# Patient Record
Sex: FEMALE | Race: OTHER | URBAN - METROPOLITAN AREA
[De-identification: names, ages, dates, MRNs, and addresses within clinical notes are randomized per-mention and may not be internally consistent; named-entity substitution may affect disease eponyms.]

---

## 2022-11-03 ENCOUNTER — APPOINTMENT (RX ONLY)
Dept: URBAN - METROPOLITAN AREA CLINIC 31 | Facility: CLINIC | Age: 77
Setting detail: DERMATOLOGY
End: 2022-11-03

## 2022-11-03 DIAGNOSIS — R20.2 PARESTHESIA OF SKIN: ICD-10-CM

## 2022-11-03 DIAGNOSIS — L57.0 ACTINIC KERATOSIS: ICD-10-CM

## 2022-11-03 DIAGNOSIS — Z00.00 ENCOUNTER FOR GENERAL ADULT MEDICAL EXAMINATION WITHOUT ABNORMAL FINDINGS: ICD-10-CM

## 2022-11-03 PROBLEM — D48.5 NEOPLASM OF UNCERTAIN BEHAVIOR OF SKIN: Status: ACTIVE | Noted: 2022-11-03

## 2022-11-03 PROCEDURE — ? LIQUID NITROGEN

## 2022-11-03 PROCEDURE — ? BIOPSY BY SHAVE METHOD

## 2022-11-03 PROCEDURE — 17003 DESTRUCT PREMALG LES 2-14: CPT

## 2022-11-03 PROCEDURE — ? COUNSELING

## 2022-11-03 PROCEDURE — 11103 TANGNTL BX SKIN EA SEP/ADDL: CPT

## 2022-11-03 PROCEDURE — 11102 TANGNTL BX SKIN SINGLE LES: CPT

## 2022-11-03 PROCEDURE — 99202 OFFICE O/P NEW SF 15 MIN: CPT | Mod: 25

## 2022-11-03 PROCEDURE — 17000 DESTRUCT PREMALG LESION: CPT | Mod: 59

## 2022-11-03 ASSESSMENT — LOCATION DETAILED DESCRIPTION DERM
LOCATION DETAILED: POSTERIOR MID-PARIETAL SCALP
LOCATION DETAILED: NASAL SUPRATIP
LOCATION DETAILED: LEFT SUPERIOR MEDIAL FOREHEAD
LOCATION DETAILED: LEFT SUPERIOR OCCIPITAL SCALP
LOCATION DETAILED: LEFT SUPERIOR FOREHEAD
LOCATION DETAILED: SUPERIOR MID FOREHEAD
LOCATION DETAILED: LEFT CENTRAL FRONTAL SCALP
LOCATION DETAILED: LEFT ANTERIOR EARLOBE
LOCATION DETAILED: RIGHT MEDIAL FRONTAL SCALP
LOCATION DETAILED: RIGHT SUPERIOR PARIETAL SCALP
LOCATION DETAILED: LEFT SUPERIOR HELIX
LOCATION DETAILED: LEFT SUPERIOR PARIETAL SCALP
LOCATION DETAILED: LEFT MID TEMPLE
LOCATION DETAILED: RIGHT INFERIOR FOREHEAD

## 2022-11-03 ASSESSMENT — LOCATION SIMPLE DESCRIPTION DERM
LOCATION SIMPLE: LEFT TEMPLE
LOCATION SIMPLE: SCALP
LOCATION SIMPLE: LEFT SCALP
LOCATION SIMPLE: SUPERIOR FOREHEAD
LOCATION SIMPLE: RIGHT FOREHEAD
LOCATION SIMPLE: POSTERIOR SCALP
LOCATION SIMPLE: LEFT FOREHEAD
LOCATION SIMPLE: NOSE
LOCATION SIMPLE: LEFT OCCIPITAL SCALP
LOCATION SIMPLE: RIGHT SCALP
LOCATION SIMPLE: LEFT EAR

## 2022-11-03 ASSESSMENT — LOCATION ZONE DERM
LOCATION ZONE: FACE
LOCATION ZONE: NOSE
LOCATION ZONE: EAR
LOCATION ZONE: SCALP

## 2022-11-03 NOTE — PROCEDURE: COUNSELING
Detail Level: Detailed
Detail Level: Zone
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

## 2022-11-03 NOTE — PROCEDURE: BIOPSY BY SHAVE METHOD
Detail Level: Detailed
Depth Of Biopsy: dermis
Was A Bandage Applied: Yes
Size Of Lesion In Cm: 0.3
X Size Of Lesion In Cm: 0
Biopsy Type: H and E
Biopsy Method: Dermablade
Anesthesia Type: 0.5% lidocaine without epinephrine
Anesthesia Volume In Cc: 0.5
Hemostasis: Silver Nitrate
Wound Care: Petrolatum
Dressing: bandage
Destruction After The Procedure: No
Type Of Destruction Used: Curettage
Curettage Text: The wound bed was treated with curettage after the biopsy was performed.
Cryotherapy Text: The wound bed was treated with cryotherapy after the biopsy was performed.
Electrodesiccation Text: The wound bed was treated with electrodesiccation after the biopsy was performed.
Electrodesiccation And Curettage Text: The wound bed was treated with electrodesiccation and curettage after the biopsy was performed.
Silver Nitrate Text: The wound bed was treated with silver nitrate after the biopsy was performed.
Lab: [id number]
Lab Facility: 
Consent: Written consent was obtained and risks were reviewed including but not limited to scarring, infection, bleeding, scabbing, incomplete removal, nerve damage and allergy to anesthesia.
Post-Care Instructions: I reviewed with the patient in detail post-care instructions. Patient is to keep the biopsy site dry overnight, and then apply bacitracin twice daily until healed. Patient may apply hydrogen peroxide soaks to remove any crusting.
Notification Instructions: Patient will be notified of biopsy results. However, patient instructed to call the office if not contacted within 2 weeks.
Billing Type: Third-Party Bill
Information: Selecting Yes will display possible errors in your note based on the variables you have selected. This validation is only offered as a suggestion for you. PLEASE NOTE THAT THE VALIDATION TEXT WILL BE REMOVED WHEN YOU FINALIZE YOUR NOTE. IF YOU WANT TO FAX A PRELIMINARY NOTE YOU WILL NEED TO TOGGLE THIS TO 'NO' IF YOU DO NOT WANT IT IN YOUR FAXED NOTE.
Size Of Lesion In Cm: 0.7
Size Of Lesion In Cm: 1

## 2022-11-29 ENCOUNTER — APPOINTMENT (RX ONLY)
Dept: URBAN - METROPOLITAN AREA CLINIC 31 | Facility: CLINIC | Age: 77
Setting detail: DERMATOLOGY
End: 2022-11-29

## 2022-11-29 DIAGNOSIS — L57.0 ACTINIC KERATOSIS: ICD-10-CM

## 2022-11-29 PROBLEM — D04.4 CARCINOMA IN SITU OF SKIN OF SCALP AND NECK: Status: ACTIVE | Noted: 2022-11-29

## 2022-11-29 PROCEDURE — ? COUNSELING

## 2022-11-29 PROCEDURE — ? LIQUID NITROGEN

## 2022-11-29 PROCEDURE — 17003 DESTRUCT PREMALG LES 2-14: CPT

## 2022-11-29 PROCEDURE — ? PRESCRIPTION

## 2022-11-29 PROCEDURE — 99213 OFFICE O/P EST LOW 20 MIN: CPT | Mod: 25

## 2022-11-29 PROCEDURE — 17000 DESTRUCT PREMALG LESION: CPT

## 2022-11-29 RX ORDER — FLUOROURACIL 5 MG/G
CREAM TOPICAL QHS
Qty: 40 | Refills: 0 | Status: ERX | COMMUNITY
Start: 2022-11-29

## 2022-11-29 RX ADMIN — FLUOROURACIL: 5 CREAM TOPICAL at 00:00

## 2022-11-29 ASSESSMENT — LOCATION SIMPLE DESCRIPTION DERM: LOCATION SIMPLE: SCALP

## 2022-11-29 ASSESSMENT — LOCATION DETAILED DESCRIPTION DERM
LOCATION DETAILED: LEFT SUPERIOR PARIETAL SCALP
LOCATION DETAILED: LEFT CENTRAL PARIETAL SCALP

## 2022-11-29 ASSESSMENT — LOCATION ZONE DERM: LOCATION ZONE: SCALP

## 2022-12-01 ENCOUNTER — RX ONLY (OUTPATIENT)
Age: 77
Setting detail: RX ONLY
End: 2022-12-01

## 2022-12-01 RX ORDER — FLUOROURACIL 5 MG/G
CREAM TOPICAL QHS
Qty: 40 | Refills: 0 | Status: ERX

## 2022-12-14 ENCOUNTER — APPOINTMENT (RX ONLY)
Dept: URBAN - METROPOLITAN AREA CLINIC 31 | Facility: CLINIC | Age: 77
Setting detail: DERMATOLOGY
End: 2022-12-14

## 2022-12-14 PROBLEM — D04.4 CARCINOMA IN SITU OF SKIN OF SCALP AND NECK: Status: ACTIVE | Noted: 2022-12-14

## 2022-12-14 PROCEDURE — 99213 OFFICE O/P EST LOW 20 MIN: CPT

## 2022-12-14 PROCEDURE — ? COUNSELING

## 2022-12-14 PROCEDURE — ? ADDITIONAL NOTES

## 2022-12-14 NOTE — PROCEDURE: MIPS QUALITY
Quality 265: Biopsy Follow-Up: Biopsy results reviewed, communicated, tracked, and documented
Quality 431: Preventive Care And Screening: Unhealthy Alcohol Use - Screening: Patient not identified as an unhealthy alcohol user when screened for unhealthy alcohol use using a systematic screening method
Quality 110: Preventive Care And Screening: Influenza Immunization: Influenza Immunization previously received during influenza season
Quality 226: Preventive Care And Screening: Tobacco Use: Screening And Cessation Intervention: Patient screened for tobacco use and is an ex/non-smoker
Quality 111:Pneumonia Vaccination Status For Older Adults: Pneumococcal vaccine (PPSV23) administered on or after patient’s 60th birthday and before the end of the measurement period
Detail Level: Detailed
Quality 130: Documentation Of Current Medications In The Medical Record: Current Medications Documented

## 2022-12-14 NOTE — PROCEDURE: ADDITIONAL NOTES
Detail Level: Detailed
Render Risk Assessment In Note?: no
Additional Notes: Continue using efudex daily in the morning, will recheck in 2 weeks.

## 2022-12-28 ENCOUNTER — APPOINTMENT (RX ONLY)
Dept: URBAN - METROPOLITAN AREA CLINIC 31 | Facility: CLINIC | Age: 77
Setting detail: DERMATOLOGY
End: 2022-12-28

## 2022-12-28 PROBLEM — D04.4 CARCINOMA IN SITU OF SKIN OF SCALP AND NECK: Status: ACTIVE | Noted: 2022-12-28

## 2022-12-28 PROCEDURE — ? ADDITIONAL NOTES

## 2022-12-28 PROCEDURE — ? COUNSELING

## 2022-12-28 PROCEDURE — 99213 OFFICE O/P EST LOW 20 MIN: CPT

## 2023-01-12 ENCOUNTER — APPOINTMENT (RX ONLY)
Dept: URBAN - METROPOLITAN AREA CLINIC 31 | Facility: CLINIC | Age: 78
Setting detail: DERMATOLOGY
End: 2023-01-12

## 2023-01-12 DIAGNOSIS — L24.4 IRRITANT CONTACT DERMATITIS DUE TO DRUGS IN CONTACT WITH SKIN: ICD-10-CM

## 2023-01-12 PROCEDURE — ? COUNSELING

## 2023-01-12 PROCEDURE — 99213 OFFICE O/P EST LOW 20 MIN: CPT

## 2023-01-12 PROCEDURE — ? ADDITIONAL NOTES

## 2023-01-12 ASSESSMENT — LOCATION DETAILED DESCRIPTION DERM
LOCATION DETAILED: RIGHT CENTRAL FRONTAL SCALP
LOCATION DETAILED: LEFT CENTRAL PARIETAL SCALP

## 2023-01-12 ASSESSMENT — LOCATION SIMPLE DESCRIPTION DERM
LOCATION SIMPLE: SCALP
LOCATION SIMPLE: RIGHT SCALP

## 2023-01-12 ASSESSMENT — LOCATION ZONE DERM: LOCATION ZONE: SCALP

## 2023-01-12 NOTE — PROCEDURE: ADDITIONAL NOTES
Additional Notes: D/C Efudex, apply vaseline jelly until healed\\nWill recheck sites in May at [facility]
Detail Level: Simple
Render Risk Assessment In Note?: no

## 2023-05-04 ENCOUNTER — APPOINTMENT (RX ONLY)
Dept: URBAN - METROPOLITAN AREA CLINIC 31 | Facility: CLINIC | Age: 78
Setting detail: DERMATOLOGY
End: 2023-05-04

## 2023-05-04 DIAGNOSIS — Z85.828 PERSONAL HISTORY OF OTHER MALIGNANT NEOPLASM OF SKIN: ICD-10-CM

## 2023-05-04 DIAGNOSIS — L81.4 OTHER MELANIN HYPERPIGMENTATION: ICD-10-CM

## 2023-05-04 DIAGNOSIS — D22 MELANOCYTIC NEVI: ICD-10-CM

## 2023-05-04 DIAGNOSIS — L82.1 OTHER SEBORRHEIC KERATOSIS: ICD-10-CM

## 2023-05-04 DIAGNOSIS — Z71.89 OTHER SPECIFIED COUNSELING: ICD-10-CM

## 2023-05-04 DIAGNOSIS — D18.0 HEMANGIOMA: ICD-10-CM

## 2023-05-04 DIAGNOSIS — L57.0 ACTINIC KERATOSIS: ICD-10-CM

## 2023-05-04 PROBLEM — D18.01 HEMANGIOMA OF SKIN AND SUBCUTANEOUS TISSUE: Status: ACTIVE | Noted: 2023-05-04

## 2023-05-04 PROBLEM — D22.5 MELANOCYTIC NEVI OF TRUNK: Status: ACTIVE | Noted: 2023-05-04

## 2023-05-04 PROCEDURE — ? COUNSELING

## 2023-05-04 PROCEDURE — 17000 DESTRUCT PREMALG LESION: CPT

## 2023-05-04 PROCEDURE — 17003 DESTRUCT PREMALG LES 2-14: CPT

## 2023-05-04 PROCEDURE — 99213 OFFICE O/P EST LOW 20 MIN: CPT | Mod: 25

## 2023-05-04 PROCEDURE — ? LIQUID NITROGEN

## 2023-05-04 ASSESSMENT — LOCATION SIMPLE DESCRIPTION DERM
LOCATION SIMPLE: LEFT FOREHEAD
LOCATION SIMPLE: LEFT SCALP
LOCATION SIMPLE: SCALP
LOCATION SIMPLE: RIGHT UPPER BACK
LOCATION SIMPLE: RIGHT FOREHEAD
LOCATION SIMPLE: NOSE
LOCATION SIMPLE: CHEST

## 2023-05-04 ASSESSMENT — LOCATION DETAILED DESCRIPTION DERM
LOCATION DETAILED: RIGHT SUPERIOR FOREHEAD
LOCATION DETAILED: LEFT CENTRAL FRONTAL SCALP
LOCATION DETAILED: LEFT SUPERIOR FOREHEAD
LOCATION DETAILED: UPPER STERNUM
LOCATION DETAILED: RIGHT INFERIOR UPPER BACK
LOCATION DETAILED: LEFT MEDIAL FRONTAL SCALP
LOCATION DETAILED: RIGHT SUPERIOR UPPER BACK
LOCATION DETAILED: NASAL DORSUM
LOCATION DETAILED: LEFT SUPERIOR MEDIAL FOREHEAD
LOCATION DETAILED: RIGHT SUPERIOR MEDIAL UPPER BACK
LOCATION DETAILED: RIGHT SUPERIOR PARIETAL SCALP
LOCATION DETAILED: LEFT CENTRAL PARIETAL SCALP

## 2023-05-04 ASSESSMENT — LOCATION ZONE DERM
LOCATION ZONE: FACE
LOCATION ZONE: SCALP
LOCATION ZONE: TRUNK
LOCATION ZONE: NOSE

## 2023-05-04 NOTE — PROCEDURE: MIPS QUALITY
Quality 130: Documentation Of Current Medications In The Medical Record: Current Medications Documented
Quality 110: Preventive Care And Screening: Influenza Immunization: Influenza Immunization Administered during Influenza season
Quality 431: Preventive Care And Screening: Unhealthy Alcohol Use - Screening: Patient not identified as an unhealthy alcohol user when screened for unhealthy alcohol use using a systematic screening method
Detail Level: Detailed
Quality 226: Preventive Care And Screening: Tobacco Use: Screening And Cessation Intervention: Patient screened for tobacco use and is an ex/non-smoker
Quality 111:Pneumonia Vaccination Status For Older Adults: Patient received any pneumococcal conjugate or polysaccharide vaccine on or after their 60th birthday and before the end of the measurement period

## 2023-11-07 ENCOUNTER — APPOINTMENT (RX ONLY)
Dept: URBAN - METROPOLITAN AREA CLINIC 31 | Facility: CLINIC | Age: 78
Setting detail: DERMATOLOGY
End: 2023-11-07

## 2023-11-07 DIAGNOSIS — D22 MELANOCYTIC NEVI: ICD-10-CM

## 2023-11-07 DIAGNOSIS — L57.0 ACTINIC KERATOSIS: ICD-10-CM

## 2023-11-07 DIAGNOSIS — D18.0 HEMANGIOMA: ICD-10-CM

## 2023-11-07 DIAGNOSIS — L82.1 OTHER SEBORRHEIC KERATOSIS: ICD-10-CM

## 2023-11-07 DIAGNOSIS — Z71.89 OTHER SPECIFIED COUNSELING: ICD-10-CM

## 2023-11-07 DIAGNOSIS — Z85.828 PERSONAL HISTORY OF OTHER MALIGNANT NEOPLASM OF SKIN: ICD-10-CM

## 2023-11-07 DIAGNOSIS — L81.4 OTHER MELANIN HYPERPIGMENTATION: ICD-10-CM

## 2023-11-07 PROBLEM — D18.01 HEMANGIOMA OF SKIN AND SUBCUTANEOUS TISSUE: Status: ACTIVE | Noted: 2023-11-07

## 2023-11-07 PROBLEM — D22.5 MELANOCYTIC NEVI OF TRUNK: Status: ACTIVE | Noted: 2023-11-07

## 2023-11-07 PROBLEM — D48.5 NEOPLASM OF UNCERTAIN BEHAVIOR OF SKIN: Status: ACTIVE | Noted: 2023-11-07

## 2023-11-07 PROCEDURE — ? LIQUID NITROGEN

## 2023-11-07 PROCEDURE — ? BIOPSY BY SHAVE METHOD

## 2023-11-07 PROCEDURE — 17004 DESTROY PREMAL LESIONS 15/>: CPT

## 2023-11-07 PROCEDURE — ? COUNSELING

## 2023-11-07 PROCEDURE — 11102 TANGNTL BX SKIN SINGLE LES: CPT | Mod: 59

## 2023-11-07 PROCEDURE — 99213 OFFICE O/P EST LOW 20 MIN: CPT | Mod: 25

## 2023-11-07 ASSESSMENT — LOCATION ZONE DERM
LOCATION ZONE: TRUNK
LOCATION ZONE: FACE
LOCATION ZONE: SCALP
LOCATION ZONE: NECK

## 2023-11-07 ASSESSMENT — LOCATION SIMPLE DESCRIPTION DERM
LOCATION SIMPLE: RIGHT FOREHEAD
LOCATION SIMPLE: POSTERIOR SCALP
LOCATION SIMPLE: RIGHT UPPER BACK
LOCATION SIMPLE: RIGHT OCCIPITAL SCALP
LOCATION SIMPLE: LEFT OCCIPITAL SCALP
LOCATION SIMPLE: SCALP
LOCATION SIMPLE: NECK
LOCATION SIMPLE: LEFT FOREHEAD

## 2023-11-07 ASSESSMENT — LOCATION DETAILED DESCRIPTION DERM
LOCATION DETAILED: LEFT SUPERIOR OCCIPITAL SCALP
LOCATION DETAILED: RIGHT MID-UPPER BACK
LOCATION DETAILED: RIGHT SUPERIOR OCCIPITAL SCALP
LOCATION DETAILED: LEFT INFERIOR LATERAL FOREHEAD
LOCATION DETAILED: RIGHT CENTRAL PARIETAL SCALP
LOCATION DETAILED: LEFT SUPERIOR PARIETAL SCALP
LOCATION DETAILED: POSTERIOR MID-PARIETAL SCALP
LOCATION DETAILED: LEFT INFERIOR FOREHEAD
LOCATION DETAILED: RIGHT INFERIOR FOREHEAD
LOCATION DETAILED: LEFT INFERIOR MEDIAL FOREHEAD
LOCATION DETAILED: RIGHT SUPERIOR UPPER BACK
LOCATION DETAILED: RIGHT INFERIOR UPPER BACK
LOCATION DETAILED: LEFT SUPERIOR LATERAL NECK

## 2023-11-07 NOTE — PROCEDURE: COUNSELING
Detail Level: Generalized
Sunscreen Recommendations: Sun screen (SPF 30 or greater) should be applied during peak UV exposure (between 10am and 2pm) and reapplied after exercise or swimming.\\nRecommended La Roche- Posay Anthelios with SPF 60 or Ming Tone Water Babies with SPF 30 and above.

## 2023-12-27 ENCOUNTER — APPOINTMENT (RX ONLY)
Dept: URBAN - METROPOLITAN AREA CLINIC 31 | Facility: CLINIC | Age: 78
Setting detail: DERMATOLOGY
End: 2023-12-27

## 2023-12-27 PROBLEM — D04.5 CARCINOMA IN SITU OF SKIN OF TRUNK: Status: ACTIVE | Noted: 2023-12-27

## 2023-12-27 PROCEDURE — ? CURETTAGE AND DESTRUCTION

## 2023-12-27 PROCEDURE — 17261 DSTRJ MAL LES T/A/L .6-1.0CM: CPT

## 2023-12-27 NOTE — PROCEDURE: CURETTAGE AND DESTRUCTION
Detail Level: Detailed
Accession # (Optional): [id number]
Number Of Curettages: 4
Size Of Lesion In Cm: 0.6
Size Of Lesion After Curettage: 1
Add Intralesional Injection: No
Concentration (Mg/Ml Or Millions Of Plaque Forming Units/Cc): 0.01
Anesthesia Type: 1% lidocaine with epinephrine
Anesthesia Volume In Cc: 0.5
Cautery Type: electrodesiccation
What Was Performed First?: Curettage
Final Size Statement: The size of the lesion after curettage was
Additional Information: (Optional): The wound was cleaned, and a pressure dressing was applied.  The patient received detailed post-op instructions.
Consent was obtained from the patient. The risks, benefits and alternatives to therapy were discussed in detail. Specifically, the risks of infection, scarring, bleeding, prolonged wound healing, nerve injury, incomplete removal, allergy to anesthesia and recurrence were addressed. Alternatives to ED&C, such as: surgical removal and XRT were also discussed.  Prior to the procedure, the treatment site was clearly identified and confirmed by the patient. All components of Universal Protocol/PAUSE Rule completed.
Post-Care Instructions: I reviewed with the patient in detail post-care instructions. Patient is to keep the area dry for 48 hours, and not to engage in any swimming until the area is healed. Should the patient develop any fevers, chills, bleeding, severe pain patient will contact the office immediately.
Bill As A Line Item Or As Units: Line Item

## 2024-01-01 ENCOUNTER — APPOINTMENT (OUTPATIENT)
Dept: RADIOLOGY | Facility: MEDICAL CENTER | Age: 79
End: 2024-01-01
Attending: PSYCHIATRY & NEUROLOGY
Payer: MEDICARE

## 2024-01-01 ENCOUNTER — APPOINTMENT (OUTPATIENT)
Dept: RADIOLOGY | Facility: MEDICAL CENTER | Age: 79
End: 2024-01-01
Attending: NURSE PRACTITIONER
Payer: MEDICARE

## 2024-01-01 ENCOUNTER — HOSPITAL ENCOUNTER (OUTPATIENT)
Dept: RADIOLOGY | Facility: MEDICAL CENTER | Age: 79
End: 2024-08-14
Payer: MEDICARE

## 2024-01-01 ENCOUNTER — APPOINTMENT (OUTPATIENT)
Dept: RADIOLOGY | Facility: MEDICAL CENTER | Age: 79
End: 2024-01-01
Attending: EMERGENCY MEDICINE
Payer: MEDICARE

## 2024-01-01 ENCOUNTER — APPOINTMENT (OUTPATIENT)
Dept: CARDIOLOGY | Facility: MEDICAL CENTER | Age: 79
End: 2024-01-01
Attending: NURSE PRACTITIONER
Payer: MEDICARE

## 2024-01-01 ENCOUNTER — HOSPITAL ENCOUNTER (INPATIENT)
Facility: MEDICAL CENTER | Age: 79
LOS: 5 days | End: 2024-08-19
Attending: EMERGENCY MEDICINE | Admitting: INTERNAL MEDICINE
Payer: MEDICARE

## 2024-01-01 ENCOUNTER — APPOINTMENT (OUTPATIENT)
Dept: RADIOLOGY | Facility: MEDICAL CENTER | Age: 79
End: 2024-01-01
Attending: RADIOLOGY
Payer: MEDICARE

## 2024-01-01 VITALS
HEIGHT: 65 IN | SYSTOLIC BLOOD PRESSURE: 164 MMHG | OXYGEN SATURATION: 91 % | BODY MASS INDEX: 24.1 KG/M2 | TEMPERATURE: 97.9 F | WEIGHT: 144.62 LBS | HEART RATE: 92 BPM | RESPIRATION RATE: 19 BRPM | DIASTOLIC BLOOD PRESSURE: 66 MMHG

## 2024-01-01 DIAGNOSIS — E04.1 THYROID NODULE: ICD-10-CM

## 2024-01-01 DIAGNOSIS — I63.512 CEREBROVASCULAR ACCIDENT (CVA) DUE TO OCCLUSION OF LEFT MIDDLE CEREBRAL ARTERY (HCC): ICD-10-CM

## 2024-01-01 DIAGNOSIS — I10 PRIMARY HYPERTENSION: ICD-10-CM

## 2024-01-01 DIAGNOSIS — S20.212A CONTUSION OF LEFT CHEST WALL, INITIAL ENCOUNTER: ICD-10-CM

## 2024-01-01 DIAGNOSIS — I63.9 ACUTE ISCHEMIC STROKE (HCC): ICD-10-CM

## 2024-01-01 DIAGNOSIS — N18.4 CKD (CHRONIC KIDNEY DISEASE) STAGE 4, GFR 15-29 ML/MIN (HCC): ICD-10-CM

## 2024-01-01 DIAGNOSIS — D63.8 ANEMIA, CHRONIC DISEASE: ICD-10-CM

## 2024-01-01 LAB
ABO + RH BLD: NORMAL
ABO GROUP BLD: NORMAL
ALBUMIN SERPL BCP-MCNC: 3.6 G/DL (ref 3.2–4.9)
ALBUMIN/GLOB SERPL: 1.4 G/DL
ALP SERPL-CCNC: 124 U/L (ref 30–99)
ALT SERPL-CCNC: 8 U/L (ref 2–50)
ANION GAP SERPL CALC-SCNC: 15 MMOL/L (ref 7–16)
ANION GAP SERPL CALC-SCNC: 16 MMOL/L (ref 7–16)
ANION GAP SERPL CALC-SCNC: 16 MMOL/L (ref 7–16)
ANION GAP SERPL CALC-SCNC: 18 MMOL/L (ref 7–16)
ANION GAP SERPL CALC-SCNC: 20 MMOL/L (ref 7–16)
ANION GAP SERPL CALC-SCNC: 20 MMOL/L (ref 7–16)
APPEARANCE UR: CLEAR
APTT PPP: 26.9 SEC (ref 24.7–36)
AST SERPL-CCNC: 19 U/L (ref 12–45)
BACTERIA #/AREA URNS HPF: NEGATIVE /HPF
BARCODED ABORH UBTYP: 9500
BARCODED PRD CODE UBPRD: NORMAL
BARCODED UNIT NUM UBUNT: NORMAL
BILIRUB SERPL-MCNC: 0.2 MG/DL (ref 0.1–1.5)
BILIRUB UR QL STRIP.AUTO: NEGATIVE
BLD GP AB SCN SERPL QL: NORMAL
BUN SERPL-MCNC: 104 MG/DL (ref 8–22)
BUN SERPL-MCNC: 59 MG/DL (ref 8–22)
BUN SERPL-MCNC: 76 MG/DL (ref 8–22)
BUN SERPL-MCNC: 82 MG/DL (ref 8–22)
BUN SERPL-MCNC: 83 MG/DL (ref 8–22)
BUN SERPL-MCNC: 84 MG/DL (ref 8–22)
CALCIUM ALBUM COR SERPL-MCNC: 10.1 MG/DL (ref 8.5–10.5)
CALCIUM SERPL-MCNC: 8.6 MG/DL (ref 8.5–10.5)
CALCIUM SERPL-MCNC: 9.2 MG/DL (ref 8.5–10.5)
CALCIUM SERPL-MCNC: 9.6 MG/DL (ref 8.5–10.5)
CALCIUM SERPL-MCNC: 9.8 MG/DL (ref 8.5–10.5)
CFT BLD TEG: 6.7 MIN (ref 4.6–9.1)
CFT P HPASE BLD TEG: 5.7 MIN (ref 4.3–8.3)
CHLORIDE SERPL-SCNC: 100 MMOL/L (ref 96–112)
CHLORIDE SERPL-SCNC: 95 MMOL/L (ref 96–112)
CHLORIDE SERPL-SCNC: 96 MMOL/L (ref 96–112)
CHLORIDE SERPL-SCNC: 99 MMOL/L (ref 96–112)
CHOLEST SERPL-MCNC: 290 MG/DL (ref 100–199)
CLOT ANGLE BLD TEG: 82.5 DEGREES (ref 63–78)
CO2 SERPL-SCNC: 17 MMOL/L (ref 20–33)
CO2 SERPL-SCNC: 21 MMOL/L (ref 20–33)
CO2 SERPL-SCNC: 21 MMOL/L (ref 20–33)
CO2 SERPL-SCNC: 22 MMOL/L (ref 20–33)
CO2 SERPL-SCNC: 22 MMOL/L (ref 20–33)
CO2 SERPL-SCNC: 23 MMOL/L (ref 20–33)
COLOR UR: YELLOW
COMPONENT R 8504R: NORMAL
CREAT SERPL-MCNC: 2.77 MG/DL (ref 0.5–1.4)
CREAT SERPL-MCNC: 3.4 MG/DL (ref 0.5–1.4)
CREAT SERPL-MCNC: 3.86 MG/DL (ref 0.5–1.4)
CREAT SERPL-MCNC: 4.25 MG/DL (ref 0.5–1.4)
CREAT SERPL-MCNC: 4.94 MG/DL (ref 0.5–1.4)
CREAT SERPL-MCNC: 6.03 MG/DL (ref 0.5–1.4)
CRP SERPL HS-MCNC: 11.51 MG/DL (ref 0–0.75)
CT.EXTRINSIC BLD ROTEM: 0.7 MIN (ref 0.8–2.1)
EKG IMPRESSION: NORMAL
EPI CELLS #/AREA URNS HPF: NEGATIVE /HPF
ERYTHROCYTE [DISTWIDTH] IN BLOOD BY AUTOMATED COUNT: 54.4 FL (ref 35.9–50)
ERYTHROCYTE [DISTWIDTH] IN BLOOD BY AUTOMATED COUNT: 54.9 FL (ref 35.9–50)
ERYTHROCYTE [DISTWIDTH] IN BLOOD BY AUTOMATED COUNT: 55.8 FL (ref 35.9–50)
ERYTHROCYTE [DISTWIDTH] IN BLOOD BY AUTOMATED COUNT: 56 FL (ref 35.9–50)
ERYTHROCYTE [DISTWIDTH] IN BLOOD BY AUTOMATED COUNT: 56.5 FL (ref 35.9–50)
ERYTHROCYTE [DISTWIDTH] IN BLOOD BY AUTOMATED COUNT: 56.8 FL (ref 35.9–50)
EST. AVERAGE GLUCOSE BLD GHB EST-MCNC: 126 MG/DL
GFR SERPLBLD CREATININE-BSD FMLA CKD-EPI: 10 ML/MIN/1.73 M 2
GFR SERPLBLD CREATININE-BSD FMLA CKD-EPI: 11 ML/MIN/1.73 M 2
GFR SERPLBLD CREATININE-BSD FMLA CKD-EPI: 13 ML/MIN/1.73 M 2
GFR SERPLBLD CREATININE-BSD FMLA CKD-EPI: 17 ML/MIN/1.73 M 2
GFR SERPLBLD CREATININE-BSD FMLA CKD-EPI: 7 ML/MIN/1.73 M 2
GFR SERPLBLD CREATININE-BSD FMLA CKD-EPI: 9 ML/MIN/1.73 M 2
GLOBULIN SER CALC-MCNC: 2.6 G/DL (ref 1.9–3.5)
GLUCOSE BLD STRIP.AUTO-MCNC: 104 MG/DL (ref 65–99)
GLUCOSE BLD STRIP.AUTO-MCNC: 120 MG/DL (ref 65–99)
GLUCOSE BLD STRIP.AUTO-MCNC: 127 MG/DL (ref 65–99)
GLUCOSE BLD STRIP.AUTO-MCNC: 128 MG/DL (ref 65–99)
GLUCOSE BLD STRIP.AUTO-MCNC: 134 MG/DL (ref 65–99)
GLUCOSE BLD STRIP.AUTO-MCNC: 135 MG/DL (ref 65–99)
GLUCOSE BLD STRIP.AUTO-MCNC: 141 MG/DL (ref 65–99)
GLUCOSE BLD STRIP.AUTO-MCNC: 143 MG/DL (ref 65–99)
GLUCOSE BLD STRIP.AUTO-MCNC: 148 MG/DL (ref 65–99)
GLUCOSE BLD STRIP.AUTO-MCNC: 152 MG/DL (ref 65–99)
GLUCOSE BLD STRIP.AUTO-MCNC: 155 MG/DL (ref 65–99)
GLUCOSE BLD STRIP.AUTO-MCNC: 156 MG/DL (ref 65–99)
GLUCOSE BLD STRIP.AUTO-MCNC: 160 MG/DL (ref 65–99)
GLUCOSE BLD STRIP.AUTO-MCNC: 163 MG/DL (ref 65–99)
GLUCOSE BLD STRIP.AUTO-MCNC: 172 MG/DL (ref 65–99)
GLUCOSE BLD STRIP.AUTO-MCNC: 186 MG/DL (ref 65–99)
GLUCOSE SERPL-MCNC: 124 MG/DL (ref 65–99)
GLUCOSE SERPL-MCNC: 146 MG/DL (ref 65–99)
GLUCOSE SERPL-MCNC: 146 MG/DL (ref 65–99)
GLUCOSE SERPL-MCNC: 153 MG/DL (ref 65–99)
GLUCOSE SERPL-MCNC: 168 MG/DL (ref 65–99)
GLUCOSE SERPL-MCNC: 170 MG/DL (ref 65–99)
GLUCOSE UR STRIP.AUTO-MCNC: 100 MG/DL
HAV IGM SERPL QL IA: NORMAL
HBA1C MFR BLD: 6 % (ref 4–5.6)
HBV CORE IGM SER QL: NORMAL
HBV SURFACE AB SERPL IA-ACNC: 241 MIU/ML (ref 0–10)
HBV SURFACE AG SER QL: NORMAL
HCT VFR BLD AUTO: 18.9 % (ref 37–47)
HCT VFR BLD AUTO: 21.6 % (ref 37–47)
HCT VFR BLD AUTO: 22.5 % (ref 37–47)
HCT VFR BLD AUTO: 22.6 % (ref 37–47)
HCT VFR BLD AUTO: 23.3 % (ref 37–47)
HCT VFR BLD AUTO: 23.6 % (ref 37–47)
HCT VFR BLD AUTO: 23.8 % (ref 37–47)
HCT VFR BLD AUTO: 23.9 % (ref 37–47)
HCT VFR BLD AUTO: 24.4 % (ref 37–47)
HCT VFR BLD AUTO: 28.3 % (ref 37–47)
HCT VFR BLD AUTO: 28.9 % (ref 37–47)
HCT VFR BLD AUTO: 30.7 % (ref 37–47)
HCT VFR BLD AUTO: 31 % (ref 37–47)
HCV AB SER QL: NORMAL
HDLC SERPL-MCNC: 66 MG/DL
HGB BLD-MCNC: 6.1 G/DL (ref 12–16)
HGB BLD-MCNC: 7 G/DL (ref 12–16)
HGB BLD-MCNC: 7.1 G/DL (ref 12–16)
HGB BLD-MCNC: 7.4 G/DL (ref 12–16)
HGB BLD-MCNC: 7.5 G/DL (ref 12–16)
HGB BLD-MCNC: 7.7 G/DL (ref 12–16)
HGB BLD-MCNC: 8.1 G/DL (ref 12–16)
HGB BLD-MCNC: 9 G/DL (ref 12–16)
HGB BLD-MCNC: 9.5 G/DL (ref 12–16)
HGB BLD-MCNC: 9.8 G/DL (ref 12–16)
HGB BLD-MCNC: 9.9 G/DL (ref 12–16)
HOLDING TUBE BB 8507: NORMAL
HYALINE CASTS #/AREA URNS LPF: NORMAL /LPF
INR PPP: 1.04 (ref 0.87–1.13)
KETONES UR STRIP.AUTO-MCNC: NEGATIVE MG/DL
LDLC SERPL CALC-MCNC: 171 MG/DL
LEUKOCYTE ESTERASE UR QL STRIP.AUTO: NEGATIVE
LV EJECT FRACT MOD 2C 99903: 63.37
LV EJECT FRACT MOD 4C 99902: 58.88
LV EJECT FRACT MOD BP 99901: 61.55
MAGNESIUM SERPL-MCNC: 2.2 MG/DL (ref 1.5–2.5)
MAGNESIUM SERPL-MCNC: 2.5 MG/DL (ref 1.5–2.5)
MCF BLD TEG: 73.8 MM (ref 52–69)
MCF.PLATELET INHIB BLD ROTEM: >52 MM (ref 15–32)
MCH RBC QN AUTO: 29.5 PG (ref 27–33)
MCH RBC QN AUTO: 29.7 PG (ref 27–33)
MCH RBC QN AUTO: 29.7 PG (ref 27–33)
MCH RBC QN AUTO: 29.8 PG (ref 27–33)
MCH RBC QN AUTO: 30 PG (ref 27–33)
MCH RBC QN AUTO: 30 PG (ref 27–33)
MCHC RBC AUTO-ENTMCNC: 31.8 G/DL (ref 32.2–35.5)
MCHC RBC AUTO-ENTMCNC: 31.8 G/DL (ref 32.2–35.5)
MCHC RBC AUTO-ENTMCNC: 32.2 G/DL (ref 32.2–35.5)
MCHC RBC AUTO-ENTMCNC: 32.2 G/DL (ref 32.2–35.5)
MCHC RBC AUTO-ENTMCNC: 32.3 G/DL (ref 32.2–35.5)
MCHC RBC AUTO-ENTMCNC: 32.6 G/DL (ref 32.2–35.5)
MCV RBC AUTO: 91.1 FL (ref 81.4–97.8)
MCV RBC AUTO: 91.6 FL (ref 81.4–97.8)
MCV RBC AUTO: 93 FL (ref 81.4–97.8)
MCV RBC AUTO: 93.1 FL (ref 81.4–97.8)
MCV RBC AUTO: 93.6 FL (ref 81.4–97.8)
MCV RBC AUTO: 93.7 FL (ref 81.4–97.8)
MICRO URNS: ABNORMAL
MUCOUS THREADS #/AREA URNS HPF: NORMAL /HPF
NITRITE UR QL STRIP.AUTO: NEGATIVE
PA AA BLD-ACNC: ABNORMAL % (ref 0–11)
PA ADP BLD-ACNC: ABNORMAL % (ref 0–17)
PH UR STRIP.AUTO: 7 [PH] (ref 5–8)
PHOSPHATE SERPL-MCNC: 8.3 MG/DL (ref 2.5–4.5)
PLATELET # BLD AUTO: 284 K/UL (ref 164–446)
PLATELET # BLD AUTO: 296 K/UL (ref 164–446)
PLATELET # BLD AUTO: 306 K/UL (ref 164–446)
PLATELET # BLD AUTO: 345 K/UL (ref 164–446)
PLATELET # BLD AUTO: 354 K/UL (ref 164–446)
PLATELET # BLD AUTO: 401 K/UL (ref 164–446)
PMV BLD AUTO: 11.1 FL (ref 9–12.9)
PMV BLD AUTO: 11.1 FL (ref 9–12.9)
PMV BLD AUTO: 11.2 FL (ref 9–12.9)
PMV BLD AUTO: 11.3 FL (ref 9–12.9)
POTASSIUM SERPL-SCNC: 2.8 MMOL/L (ref 3.6–5.5)
POTASSIUM SERPL-SCNC: 3.7 MMOL/L (ref 3.6–5.5)
POTASSIUM SERPL-SCNC: 3.7 MMOL/L (ref 3.6–5.5)
POTASSIUM SERPL-SCNC: 3.8 MMOL/L (ref 3.6–5.5)
POTASSIUM SERPL-SCNC: 3.9 MMOL/L (ref 3.6–5.5)
POTASSIUM SERPL-SCNC: 4.2 MMOL/L (ref 3.6–5.5)
PREALB SERPL-MCNC: 20.1 MG/DL (ref 18–38)
PRODUCT TYPE UPROD: NORMAL
PROT SERPL-MCNC: 6.2 G/DL (ref 6–8.2)
PROT UR QL STRIP: 300 MG/DL
PROTHROMBIN TIME: 13.7 SEC (ref 12–14.6)
RBC # BLD AUTO: 2.03 M/UL (ref 4.2–5.4)
RBC # BLD AUTO: 2.49 M/UL (ref 4.2–5.4)
RBC # BLD AUTO: 2.59 M/UL (ref 4.2–5.4)
RBC # BLD AUTO: 2.61 M/UL (ref 4.2–5.4)
RBC # BLD AUTO: 3.02 M/UL (ref 4.2–5.4)
RBC # BLD AUTO: 3.3 M/UL (ref 4.2–5.4)
RBC # URNS HPF: NORMAL /HPF
RBC UR QL AUTO: NEGATIVE
RH BLD: NORMAL
SODIUM SERPL-SCNC: 133 MMOL/L (ref 135–145)
SODIUM SERPL-SCNC: 135 MMOL/L (ref 135–145)
SODIUM SERPL-SCNC: 135 MMOL/L (ref 135–145)
SODIUM SERPL-SCNC: 136 MMOL/L (ref 135–145)
SODIUM SERPL-SCNC: 136 MMOL/L (ref 135–145)
SODIUM SERPL-SCNC: 138 MMOL/L (ref 135–145)
SP GR UR STRIP.AUTO: 1.02
TEG ALGORITHM TGALG: ABNORMAL
TRIGL SERPL-MCNC: 263 MG/DL (ref 0–149)
TSH SERPL DL<=0.005 MIU/L-ACNC: 1.65 UIU/ML (ref 0.38–5.33)
UFH PPP CHRO-ACNC: 0.17 IU/ML
UFH PPP CHRO-ACNC: 0.33 IU/ML
UFH PPP CHRO-ACNC: 0.38 IU/ML
UFH PPP CHRO-ACNC: 0.43 IU/ML
UFH PPP CHRO-ACNC: 0.5 IU/ML
UFH PPP CHRO-ACNC: 0.61 IU/ML
UFH PPP CHRO-ACNC: 0.64 IU/ML
UFH PPP CHRO-ACNC: <0.1 IU/ML
UFH PPP CHRO-ACNC: <0.1 IU/ML
UNIT STATUS USTAT: NORMAL
UROBILINOGEN UR STRIP.AUTO-MCNC: 0.2 MG/DL
WBC # BLD AUTO: 10 K/UL (ref 4.8–10.8)
WBC # BLD AUTO: 12.5 K/UL (ref 4.8–10.8)
WBC # BLD AUTO: 12.9 K/UL (ref 4.8–10.8)
WBC # BLD AUTO: 13.2 K/UL (ref 4.8–10.8)
WBC # BLD AUTO: 13.3 K/UL (ref 4.8–10.8)
WBC # BLD AUTO: 13.7 K/UL (ref 4.8–10.8)
WBC #/AREA URNS HPF: NORMAL /HPF

## 2024-01-01 PROCEDURE — 85520 HEPARIN ASSAY: CPT

## 2024-01-01 PROCEDURE — 99232 SBSQ HOSP IP/OBS MODERATE 35: CPT | Performed by: PSYCHIATRY & NEUROLOGY

## 2024-01-01 PROCEDURE — 700105 HCHG RX REV CODE 258: Performed by: NURSE PRACTITIONER

## 2024-01-01 PROCEDURE — 70450 CT HEAD/BRAIN W/O DYE: CPT

## 2024-01-01 PROCEDURE — 85027 COMPLETE CBC AUTOMATED: CPT | Mod: 91

## 2024-01-01 PROCEDURE — 700102 HCHG RX REV CODE 250 W/ 637 OVERRIDE(OP): Performed by: STUDENT IN AN ORGANIZED HEALTH CARE EDUCATION/TRAINING PROGRAM

## 2024-01-01 PROCEDURE — 770022 HCHG ROOM/CARE - ICU (200)

## 2024-01-01 PROCEDURE — 85018 HEMOGLOBIN: CPT | Mod: 91

## 2024-01-01 PROCEDURE — 83735 ASSAY OF MAGNESIUM: CPT

## 2024-01-01 PROCEDURE — 95816 EEG AWAKE AND DROWSY: CPT | Mod: 26 | Performed by: PSYCHIATRY & NEUROLOGY

## 2024-01-01 PROCEDURE — 80048 BASIC METABOLIC PNL TOTAL CA: CPT

## 2024-01-01 PROCEDURE — 700101 HCHG RX REV CODE 250: Performed by: NURSE PRACTITIONER

## 2024-01-01 PROCEDURE — 85018 HEMOGLOBIN: CPT

## 2024-01-01 PROCEDURE — 86901 BLOOD TYPING SEROLOGIC RH(D): CPT

## 2024-01-01 PROCEDURE — 700101 HCHG RX REV CODE 250: Performed by: RADIOLOGY

## 2024-01-01 PROCEDURE — 700111 HCHG RX REV CODE 636 W/ 250 OVERRIDE (IP): Performed by: NURSE PRACTITIONER

## 2024-01-01 PROCEDURE — 82962 GLUCOSE BLOOD TEST: CPT

## 2024-01-01 PROCEDURE — 700102 HCHG RX REV CODE 250 W/ 637 OVERRIDE(OP): Performed by: NURSE PRACTITIONER

## 2024-01-01 PROCEDURE — 70496 CT ANGIOGRAPHY HEAD: CPT

## 2024-01-01 PROCEDURE — 99238 HOSP IP/OBS DSCHRG MGMT 30/<: CPT | Performed by: INTERNAL MEDICINE

## 2024-01-01 PROCEDURE — 93005 ELECTROCARDIOGRAM TRACING: CPT | Performed by: NURSE PRACTITIONER

## 2024-01-01 PROCEDURE — 85027 COMPLETE CBC AUTOMATED: CPT

## 2024-01-01 PROCEDURE — 700111 HCHG RX REV CODE 636 W/ 250 OVERRIDE (IP): Mod: JZ | Performed by: PSYCHIATRY & NEUROLOGY

## 2024-01-01 PROCEDURE — 99223 1ST HOSP IP/OBS HIGH 75: CPT | Performed by: PSYCHIATRY & NEUROLOGY

## 2024-01-01 PROCEDURE — 70498 CT ANGIOGRAPHY NECK: CPT

## 2024-01-01 PROCEDURE — 80053 COMPREHEN METABOLIC PANEL: CPT

## 2024-01-01 PROCEDURE — 80061 LIPID PANEL: CPT

## 2024-01-01 PROCEDURE — 85014 HEMATOCRIT: CPT

## 2024-01-01 PROCEDURE — 70551 MRI BRAIN STEM W/O DYE: CPT

## 2024-01-01 PROCEDURE — 86706 HEP B SURFACE ANTIBODY: CPT

## 2024-01-01 PROCEDURE — 85014 HEMATOCRIT: CPT | Mod: 91

## 2024-01-01 PROCEDURE — 86900 BLOOD TYPING SEROLOGIC ABO: CPT

## 2024-01-01 PROCEDURE — 5A1D70Z PERFORMANCE OF URINARY FILTRATION, INTERMITTENT, LESS THAN 6 HOURS PER DAY: ICD-10-PCS | Performed by: INTERNAL MEDICINE

## 2024-01-01 PROCEDURE — 700111 HCHG RX REV CODE 636 W/ 250 OVERRIDE (IP): Mod: JG | Performed by: RADIOLOGY

## 2024-01-01 PROCEDURE — 03CG3ZZ EXTIRPATION OF MATTER FROM INTRACRANIAL ARTERY, PERCUTANEOUS APPROACH: ICD-10-PCS | Performed by: RADIOLOGY

## 2024-01-01 PROCEDURE — 700105 HCHG RX REV CODE 258: Performed by: RADIOLOGY

## 2024-01-01 PROCEDURE — 95720 EEG PHY/QHP EA INCR W/VEEG: CPT | Performed by: PSYCHIATRY & NEUROLOGY

## 2024-01-01 PROCEDURE — A9270 NON-COVERED ITEM OR SERVICE: HCPCS | Performed by: NURSE PRACTITIONER

## 2024-01-01 PROCEDURE — 36620 INSERTION CATHETER ARTERY: CPT | Performed by: NURSE PRACTITIONER

## 2024-01-01 PROCEDURE — 82962 GLUCOSE BLOOD TEST: CPT | Mod: 91

## 2024-01-01 PROCEDURE — 99233 SBSQ HOSP IP/OBS HIGH 50: CPT | Performed by: PSYCHIATRY & NEUROLOGY

## 2024-01-01 PROCEDURE — 99291 CRITICAL CARE FIRST HOUR: CPT | Performed by: NURSE PRACTITIONER

## 2024-01-01 PROCEDURE — 85384 FIBRINOGEN ACTIVITY: CPT

## 2024-01-01 PROCEDURE — 85520 HEPARIN ASSAY: CPT | Mod: 91

## 2024-01-01 PROCEDURE — 84100 ASSAY OF PHOSPHORUS: CPT

## 2024-01-01 PROCEDURE — 85347 COAGULATION TIME ACTIVATED: CPT

## 2024-01-01 PROCEDURE — A9270 NON-COVERED ITEM OR SERVICE: HCPCS | Performed by: STUDENT IN AN ORGANIZED HEALTH CARE EDUCATION/TRAINING PROGRAM

## 2024-01-01 PROCEDURE — 74177 CT ABD & PELVIS W/CONTRAST: CPT

## 2024-01-01 PROCEDURE — 95700 EEG CONT REC W/VID EEG TECH: CPT | Performed by: PSYCHIATRY & NEUROLOGY

## 2024-01-01 PROCEDURE — 85610 PROTHROMBIN TIME: CPT

## 2024-01-01 PROCEDURE — 36620 INSERTION CATHETER ARTERY: CPT

## 2024-01-01 PROCEDURE — 86140 C-REACTIVE PROTEIN: CPT

## 2024-01-01 PROCEDURE — 4A10X4Z MONITORING OF CENTRAL NERVOUS ELECTRICAL ACTIVITY, EXTERNAL APPROACH: ICD-10-PCS | Performed by: PSYCHIATRY & NEUROLOGY

## 2024-01-01 PROCEDURE — 700117 HCHG RX CONTRAST REV CODE 255: Performed by: EMERGENCY MEDICINE

## 2024-01-01 PROCEDURE — 99291 CRITICAL CARE FIRST HOUR: CPT

## 2024-01-01 PROCEDURE — 700111 HCHG RX REV CODE 636 W/ 250 OVERRIDE (IP): Performed by: EMERGENCY MEDICINE

## 2024-01-01 PROCEDURE — 302136 NUTRITION PUMP: Performed by: NURSE PRACTITIONER

## 2024-01-01 PROCEDURE — 700101 HCHG RX REV CODE 250: Performed by: STUDENT IN AN ORGANIZED HEALTH CARE EDUCATION/TRAINING PROGRAM

## 2024-01-01 PROCEDURE — 97162 PT EVAL MOD COMPLEX 30 MIN: CPT

## 2024-01-01 PROCEDURE — 36223 PLACE CATH CAROTID/INOM ART: CPT

## 2024-01-01 PROCEDURE — 86923 COMPATIBILITY TEST ELECTRIC: CPT

## 2024-01-01 PROCEDURE — 95714 VEEG EA 12-26 HR UNMNTR: CPT | Performed by: PSYCHIATRY & NEUROLOGY

## 2024-01-01 PROCEDURE — 81001 URINALYSIS AUTO W/SCOPE: CPT

## 2024-01-01 PROCEDURE — 90935 HEMODIALYSIS ONE EVALUATION: CPT

## 2024-01-01 PROCEDURE — 95816 EEG AWAKE AND DROWSY: CPT | Performed by: PSYCHIATRY & NEUROLOGY

## 2024-01-01 PROCEDURE — 99223 1ST HOSP IP/OBS HIGH 75: CPT | Performed by: PHYSICAL MEDICINE & REHABILITATION

## 2024-01-01 PROCEDURE — 86850 RBC ANTIBODY SCREEN: CPT

## 2024-01-01 PROCEDURE — 84443 ASSAY THYROID STIM HORMONE: CPT

## 2024-01-01 PROCEDURE — 93306 TTE W/DOPPLER COMPLETE: CPT | Mod: 26 | Performed by: INTERNAL MEDICINE

## 2024-01-01 PROCEDURE — 700105 HCHG RX REV CODE 258: Performed by: STUDENT IN AN ORGANIZED HEALTH CARE EDUCATION/TRAINING PROGRAM

## 2024-01-01 PROCEDURE — 85730 THROMBOPLASTIN TIME PARTIAL: CPT

## 2024-01-01 PROCEDURE — 03HY32Z INSERTION OF MONITORING DEVICE INTO UPPER ARTERY, PERCUTANEOUS APPROACH: ICD-10-PCS | Performed by: STUDENT IN AN ORGANIZED HEALTH CARE EDUCATION/TRAINING PROGRAM

## 2024-01-01 PROCEDURE — 80074 ACUTE HEPATITIS PANEL: CPT

## 2024-01-01 PROCEDURE — P9016 RBC LEUKOCYTES REDUCED: HCPCS

## 2024-01-01 PROCEDURE — 93306 TTE W/DOPPLER COMPLETE: CPT

## 2024-01-01 PROCEDURE — 700111 HCHG RX REV CODE 636 W/ 250 OVERRIDE (IP): Mod: JZ | Performed by: RADIOLOGY

## 2024-01-01 PROCEDURE — 30233N1 TRANSFUSION OF NONAUTOLOGOUS RED BLOOD CELLS INTO PERIPHERAL VEIN, PERCUTANEOUS APPROACH: ICD-10-PCS | Performed by: NURSE PRACTITIONER

## 2024-01-01 PROCEDURE — 97167 OT EVAL HIGH COMPLEX 60 MIN: CPT

## 2024-01-01 PROCEDURE — 99291 CRITICAL CARE FIRST HOUR: CPT | Mod: 25 | Performed by: NURSE PRACTITIONER

## 2024-01-01 PROCEDURE — 93010 ELECTROCARDIOGRAM REPORT: CPT | Performed by: INTERNAL MEDICINE

## 2024-01-01 PROCEDURE — 700117 HCHG RX CONTRAST REV CODE 255: Performed by: PSYCHIATRY & NEUROLOGY

## 2024-01-01 PROCEDURE — 97530 THERAPEUTIC ACTIVITIES: CPT

## 2024-01-01 PROCEDURE — 36430 TRANSFUSION BLD/BLD COMPNT: CPT

## 2024-01-01 PROCEDURE — 97535 SELF CARE MNGMENT TRAINING: CPT

## 2024-01-01 PROCEDURE — 0042T CT-CEREBRAL PERFUSION ANALYSIS: CPT

## 2024-01-01 PROCEDURE — 83036 HEMOGLOBIN GLYCOSYLATED A1C: CPT

## 2024-01-01 PROCEDURE — 700111 HCHG RX REV CODE 636 W/ 250 OVERRIDE (IP): Mod: JZ | Performed by: NURSE PRACTITIONER

## 2024-01-01 PROCEDURE — 84134 ASSAY OF PREALBUMIN: CPT

## 2024-01-01 PROCEDURE — 85576 BLOOD PLATELET AGGREGATION: CPT | Mod: 91

## 2024-01-01 PROCEDURE — 97602 WOUND(S) CARE NON-SELECTIVE: CPT

## 2024-01-01 RX ORDER — ATORVASTATIN CALCIUM 80 MG/1
80 TABLET, FILM COATED ORAL EVERY EVENING
Status: DISCONTINUED | OUTPATIENT
Start: 2024-01-01 | End: 2024-01-01

## 2024-01-01 RX ORDER — ACETAMINOPHEN 325 MG/1
650 TABLET ORAL EVERY 6 HOURS PRN
Status: DISCONTINUED | OUTPATIENT
Start: 2024-01-01 | End: 2024-01-01

## 2024-01-01 RX ORDER — AMOXICILLIN 250 MG
2 CAPSULE ORAL EVERY EVENING
Status: DISCONTINUED | OUTPATIENT
Start: 2024-01-01 | End: 2024-01-01

## 2024-01-01 RX ORDER — LEVETIRACETAM 500 MG/5ML
1000 INJECTION, SOLUTION, CONCENTRATE INTRAVENOUS EVERY 12 HOURS
Status: DISCONTINUED | OUTPATIENT
Start: 2024-01-01 | End: 2024-01-01

## 2024-01-01 RX ORDER — POTASSIUM CHLORIDE 7.45 MG/ML
10 INJECTION INTRAVENOUS ONCE
Status: COMPLETED | OUTPATIENT
Start: 2024-01-01 | End: 2024-01-01

## 2024-01-01 RX ORDER — FUROSEMIDE 40 MG
80 TABLET ORAL 2 TIMES DAILY
COMMUNITY
Start: 2024-01-01

## 2024-01-01 RX ORDER — ALBUTEROL SULFATE 90 UG/1
2 INHALANT RESPIRATORY (INHALATION) EVERY 6 HOURS PRN
COMMUNITY
Start: 2024-01-01

## 2024-01-01 RX ORDER — ACETAMINOPHEN 325 MG/1
650 TABLET ORAL EVERY 6 HOURS PRN
Status: DISCONTINUED | OUTPATIENT
Start: 2024-01-01 | End: 2024-01-01 | Stop reason: HOSPADM

## 2024-01-01 RX ORDER — HYDRALAZINE HYDROCHLORIDE 20 MG/ML
INJECTION INTRAMUSCULAR; INTRAVENOUS
Status: DISPENSED
Start: 2024-01-01 | End: 2024-01-01

## 2024-01-01 RX ORDER — POLYETHYLENE GLYCOL 3350 17 G/17G
1 POWDER, FOR SOLUTION ORAL
Status: DISCONTINUED | OUTPATIENT
Start: 2024-01-01 | End: 2024-01-01 | Stop reason: HOSPADM

## 2024-01-01 RX ORDER — CLOPIDOGREL BISULFATE 75 MG/1
75 TABLET ORAL DAILY
COMMUNITY

## 2024-01-01 RX ORDER — ONDANSETRON 2 MG/ML
4 INJECTION INTRAMUSCULAR; INTRAVENOUS EVERY 4 HOURS PRN
Status: DISCONTINUED | OUTPATIENT
Start: 2024-01-01 | End: 2024-01-01 | Stop reason: HOSPADM

## 2024-01-01 RX ORDER — LABETALOL HYDROCHLORIDE 5 MG/ML
10 INJECTION, SOLUTION INTRAVENOUS
Status: COMPLETED | OUTPATIENT
Start: 2024-01-01 | End: 2024-01-01

## 2024-01-01 RX ORDER — LABETALOL HYDROCHLORIDE 5 MG/ML
10-20 INJECTION, SOLUTION INTRAVENOUS EVERY 4 HOURS PRN
Status: DISCONTINUED | OUTPATIENT
Start: 2024-01-01 | End: 2024-01-01 | Stop reason: HOSPADM

## 2024-01-01 RX ORDER — SODIUM CHLORIDE 9 MG/ML
500 INJECTION, SOLUTION INTRAVENOUS ONCE
Status: COMPLETED | OUTPATIENT
Start: 2024-01-01 | End: 2024-01-01

## 2024-01-01 RX ORDER — ONDANSETRON 4 MG/1
4 TABLET, ORALLY DISINTEGRATING ORAL EVERY 4 HOURS PRN
Status: DISCONTINUED | OUTPATIENT
Start: 2024-01-01 | End: 2024-01-01

## 2024-01-01 RX ORDER — PANTOPRAZOLE SODIUM 40 MG/10ML
40 INJECTION, POWDER, LYOPHILIZED, FOR SOLUTION INTRAVENOUS 2 TIMES DAILY
Status: COMPLETED | OUTPATIENT
Start: 2024-01-01 | End: 2024-01-01

## 2024-01-01 RX ORDER — NOREPINEPHRINE BITARTRATE 0.03 MG/ML
0-1 INJECTION, SOLUTION INTRAVENOUS CONTINUOUS
Status: DISCONTINUED | OUTPATIENT
Start: 2024-01-01 | End: 2024-01-01

## 2024-01-01 RX ORDER — HYDRALAZINE HYDROCHLORIDE 20 MG/ML
10-20 INJECTION INTRAMUSCULAR; INTRAVENOUS EVERY 4 HOURS PRN
Status: DISCONTINUED | OUTPATIENT
Start: 2024-01-01 | End: 2024-01-01 | Stop reason: HOSPADM

## 2024-01-01 RX ORDER — PANTOPRAZOLE SODIUM 40 MG/1
1 TABLET, DELAYED RELEASE ORAL DAILY
COMMUNITY

## 2024-01-01 RX ORDER — POLYETHYLENE GLYCOL 3350 17 G/17G
1 POWDER, FOR SOLUTION ORAL
Status: DISCONTINUED | OUTPATIENT
Start: 2024-01-01 | End: 2024-01-01

## 2024-01-01 RX ORDER — LEVETIRACETAM 500 MG/5ML
1000 INJECTION, SOLUTION, CONCENTRATE INTRAVENOUS DAILY
Status: DISCONTINUED | OUTPATIENT
Start: 2024-08-20 | End: 2024-01-01 | Stop reason: HOSPADM

## 2024-01-01 RX ORDER — DEXTROSE MONOHYDRATE 25 G/50ML
25 INJECTION, SOLUTION INTRAVENOUS
Status: DISCONTINUED | OUTPATIENT
Start: 2024-01-01 | End: 2024-01-01 | Stop reason: HOSPADM

## 2024-01-01 RX ORDER — CLOPIDOGREL BISULFATE 75 MG/1
75 TABLET ORAL DAILY
Status: DISCONTINUED | OUTPATIENT
Start: 2024-01-01 | End: 2024-01-01

## 2024-01-01 RX ORDER — GABAPENTIN 100 MG/1
1 CAPSULE ORAL 3 TIMES DAILY
COMMUNITY
Start: 2024-01-01

## 2024-01-01 RX ORDER — HYDRALAZINE HYDROCHLORIDE 20 MG/ML
INJECTION INTRAMUSCULAR; INTRAVENOUS
Status: COMPLETED | OUTPATIENT
Start: 2024-01-01 | End: 2024-01-01

## 2024-01-01 RX ORDER — MIDAZOLAM HYDROCHLORIDE 1 MG/ML
0.5 INJECTION INTRAMUSCULAR; INTRAVENOUS
Status: DISPENSED | OUTPATIENT
Start: 2024-01-01 | End: 2024-01-01

## 2024-01-01 RX ORDER — ONDANSETRON 4 MG/1
4 TABLET, ORALLY DISINTEGRATING ORAL EVERY 4 HOURS PRN
Status: DISCONTINUED | OUTPATIENT
Start: 2024-01-01 | End: 2024-01-01 | Stop reason: HOSPADM

## 2024-01-01 RX ORDER — HYDRALAZINE HYDROCHLORIDE 20 MG/ML
10 INJECTION INTRAMUSCULAR; INTRAVENOUS
Status: DISCONTINUED | OUTPATIENT
Start: 2024-01-01 | End: 2024-01-01

## 2024-01-01 RX ORDER — ATORVASTATIN CALCIUM 40 MG/1
40 TABLET, FILM COATED ORAL EVERY EVENING
Status: DISCONTINUED | OUTPATIENT
Start: 2024-01-01 | End: 2024-01-01

## 2024-01-01 RX ORDER — BIMATOPROST 0.1 MG/ML
1 SOLUTION/ DROPS OPHTHALMIC
COMMUNITY

## 2024-01-01 RX ORDER — LABETALOL HYDROCHLORIDE 5 MG/ML
10 INJECTION, SOLUTION INTRAVENOUS ONCE
Status: DISCONTINUED | OUTPATIENT
Start: 2024-01-01 | End: 2024-01-01

## 2024-01-01 RX ORDER — LEVETIRACETAM 500 MG/5ML
500 INJECTION, SOLUTION, CONCENTRATE INTRAVENOUS
Status: DISCONTINUED | OUTPATIENT
Start: 2024-01-01 | End: 2024-01-01 | Stop reason: HOSPADM

## 2024-01-01 RX ORDER — DEXMEDETOMIDINE HYDROCHLORIDE 4 UG/ML
.1-1.5 INJECTION, SOLUTION INTRAVENOUS CONTINUOUS
Status: DISCONTINUED | OUTPATIENT
Start: 2024-01-01 | End: 2024-01-01

## 2024-01-01 RX ORDER — TRIAMCINOLONE ACETONIDE 5 MG/G
1 CREAM TOPICAL 3 TIMES DAILY
COMMUNITY
Start: 2024-01-01

## 2024-01-01 RX ORDER — GAUZE BANDAGE 2" X 2"
100 BANDAGE TOPICAL DAILY
Status: DISCONTINUED | OUTPATIENT
Start: 2024-01-01 | End: 2024-01-01 | Stop reason: HOSPADM

## 2024-01-01 RX ORDER — HYDRALAZINE HYDROCHLORIDE 20 MG/ML
10 INJECTION INTRAMUSCULAR; INTRAVENOUS EVERY 4 HOURS PRN
Status: DISCONTINUED | OUTPATIENT
Start: 2024-01-01 | End: 2024-01-01

## 2024-01-01 RX ORDER — AMOXICILLIN 250 MG
2 CAPSULE ORAL EVERY EVENING
Status: DISCONTINUED | OUTPATIENT
Start: 2024-01-01 | End: 2024-01-01 | Stop reason: HOSPADM

## 2024-01-01 RX ORDER — HYDRALAZINE HYDROCHLORIDE 20 MG/ML
10-20 INJECTION INTRAMUSCULAR; INTRAVENOUS EVERY 4 HOURS PRN
Status: DISCONTINUED | OUTPATIENT
Start: 2024-01-01 | End: 2024-01-01

## 2024-01-01 RX ORDER — MORPHINE SULFATE 4 MG/ML
2 INJECTION INTRAVENOUS
Status: DISCONTINUED | OUTPATIENT
Start: 2024-01-01 | End: 2024-01-01 | Stop reason: HOSPADM

## 2024-01-01 RX ORDER — CEPHALEXIN 500 MG/1
500 CAPSULE ORAL 2 TIMES DAILY
COMMUNITY
Start: 2024-01-01

## 2024-01-01 RX ORDER — TRIAMTERENE AND HYDROCHLOROTHIAZIDE 75; 50 MG/1; MG/1
1 TABLET ORAL DAILY
COMMUNITY

## 2024-01-01 RX ORDER — ALLOPURINOL 300 MG/1
300 TABLET ORAL DAILY
COMMUNITY
Start: 2024-01-01

## 2024-01-01 RX ORDER — SODIUM CHLORIDE 9 MG/ML
1000 INJECTION, SOLUTION INTRAVENOUS
Status: DISCONTINUED | OUTPATIENT
Start: 2024-01-01 | End: 2024-01-01 | Stop reason: HOSPADM

## 2024-01-01 RX ORDER — LABETALOL HYDROCHLORIDE 5 MG/ML
10-20 INJECTION, SOLUTION INTRAVENOUS EVERY 4 HOURS PRN
Status: DISCONTINUED | OUTPATIENT
Start: 2024-01-01 | End: 2024-01-01

## 2024-01-01 RX ORDER — HEPARIN SODIUM 5000 [USP'U]/100ML
0-30 INJECTION, SOLUTION INTRAVENOUS CONTINUOUS
Status: DISCONTINUED | OUTPATIENT
Start: 2024-01-01 | End: 2024-01-01 | Stop reason: HOSPADM

## 2024-01-01 RX ORDER — ASPIRIN 81 MG/1
81 TABLET, CHEWABLE ORAL EVERY EVENING
Status: DISCONTINUED | OUTPATIENT
Start: 2024-01-01 | End: 2024-01-01

## 2024-01-01 RX ORDER — ATORVASTATIN CALCIUM 40 MG/1
80 TABLET, FILM COATED ORAL EVERY EVENING
Status: DISCONTINUED | OUTPATIENT
Start: 2024-01-01 | End: 2024-01-01 | Stop reason: HOSPADM

## 2024-01-01 RX ORDER — POTASSIUM CHLORIDE 7.45 MG/ML
10 INJECTION INTRAVENOUS
Status: COMPLETED | OUTPATIENT
Start: 2024-01-01 | End: 2024-01-01

## 2024-01-01 RX ADMIN — LABETALOL HYDROCHLORIDE 20 MG: 5 INJECTION, SOLUTION INTRAVENOUS at 12:39

## 2024-01-01 RX ADMIN — POLYETHYLENE GLYCOL 3350 1 PACKET: 17 POWDER, FOR SOLUTION ORAL at 17:24

## 2024-01-01 RX ADMIN — HEPARIN SODIUM 14 UNITS/KG/HR: 5000 INJECTION, SOLUTION INTRAVENOUS at 02:18

## 2024-01-01 RX ADMIN — LEVETIRACETAM 1000 MG: 100 INJECTION, SOLUTION INTRAVENOUS at 05:28

## 2024-01-01 RX ADMIN — LEVETIRACETAM 1000 MG: 100 INJECTION, SOLUTION INTRAVENOUS at 05:32

## 2024-01-01 RX ADMIN — POTASSIUM CHLORIDE 10 MEQ: 7.46 INJECTION, SOLUTION INTRAVENOUS at 04:51

## 2024-01-01 RX ADMIN — LEVETIRACETAM 1000 MG: 100 INJECTION, SOLUTION INTRAVENOUS at 17:24

## 2024-01-01 RX ADMIN — POTASSIUM CHLORIDE 10 MEQ: 7.46 INJECTION, SOLUTION INTRAVENOUS at 02:44

## 2024-01-01 RX ADMIN — HYDRALAZINE HYDROCHLORIDE 20 MG: 20 INJECTION INTRAMUSCULAR; INTRAVENOUS at 13:40

## 2024-01-01 RX ADMIN — MORPHINE SULFATE 2 MG: 4 INJECTION INTRAVENOUS at 12:17

## 2024-01-01 RX ADMIN — POTASSIUM CHLORIDE 10 MEQ: 7.46 INJECTION, SOLUTION INTRAVENOUS at 03:47

## 2024-01-01 RX ADMIN — INSULIN HUMAN 2 UNITS: 100 INJECTION, SOLUTION PARENTERAL at 05:23

## 2024-01-01 RX ADMIN — SODIUM CHLORIDE 5 MG/HR: 9 INJECTION, SOLUTION INTRAVENOUS at 04:56

## 2024-01-01 RX ADMIN — SENNOSIDES AND DOCUSATE SODIUM 2 TABLET: 50; 8.6 TABLET ORAL at 18:22

## 2024-01-01 RX ADMIN — SODIUM CHLORIDE 5 MG/HR: 9 INJECTION, SOLUTION INTRAVENOUS at 10:27

## 2024-01-01 RX ADMIN — Medication 100 MG: at 18:22

## 2024-01-01 RX ADMIN — HYDRALAZINE HYDROCHLORIDE 20 MG: 20 INJECTION, SOLUTION INTRAMUSCULAR; INTRAVENOUS at 10:30

## 2024-01-01 RX ADMIN — HYDRALAZINE HYDROCHLORIDE 20 MG: 20 INJECTION INTRAMUSCULAR; INTRAVENOUS at 06:37

## 2024-01-01 RX ADMIN — INSULIN HUMAN 2 UNITS: 100 INJECTION, SOLUTION PARENTERAL at 18:27

## 2024-01-01 RX ADMIN — FENTANYL CITRATE 25 MCG: 50 INJECTION, SOLUTION INTRAMUSCULAR; INTRAVENOUS at 01:51

## 2024-01-01 RX ADMIN — HEPARIN SODIUM 14 UNITS/KG/HR: 5000 INJECTION, SOLUTION INTRAVENOUS at 02:21

## 2024-01-01 RX ADMIN — SODIUM CHLORIDE 10 MG/HR: 9 INJECTION, SOLUTION INTRAVENOUS at 23:55

## 2024-01-01 RX ADMIN — ATORVASTATIN CALCIUM 80 MG: 80 TABLET, FILM COATED ORAL at 17:24

## 2024-01-01 RX ADMIN — HYDRALAZINE HYDROCHLORIDE 20 MG: 20 INJECTION INTRAMUSCULAR; INTRAVENOUS at 18:29

## 2024-01-01 RX ADMIN — HEPARIN SODIUM 12 UNITS/KG/HR: 5000 INJECTION, SOLUTION INTRAVENOUS at 22:11

## 2024-01-01 RX ADMIN — INSULIN HUMAN 2 UNITS: 100 INJECTION, SOLUTION PARENTERAL at 05:49

## 2024-01-01 RX ADMIN — SODIUM CHLORIDE 2.5 MG/HR: 9 INJECTION, SOLUTION INTRAVENOUS at 06:13

## 2024-01-01 RX ADMIN — SODIUM CHLORIDE 5 MG/HR: 9 INJECTION, SOLUTION INTRAVENOUS at 18:44

## 2024-01-01 RX ADMIN — LABETALOL HYDROCHLORIDE 20 MG: 5 INJECTION, SOLUTION INTRAVENOUS at 18:50

## 2024-01-01 RX ADMIN — LEVETIRACETAM 1000 MG: 100 INJECTION, SOLUTION INTRAVENOUS at 17:34

## 2024-01-01 RX ADMIN — LABETALOL HYDROCHLORIDE 10 MG: 5 INJECTION, SOLUTION INTRAVENOUS at 16:20

## 2024-01-01 RX ADMIN — SODIUM CHLORIDE 5 MG/HR: 9 INJECTION, SOLUTION INTRAVENOUS at 12:09

## 2024-01-01 RX ADMIN — ATORVASTATIN CALCIUM 80 MG: 80 TABLET, FILM COATED ORAL at 18:22

## 2024-01-01 RX ADMIN — LABETALOL HYDROCHLORIDE 10 MG: 5 INJECTION, SOLUTION INTRAVENOUS at 23:34

## 2024-01-01 RX ADMIN — IOHEXOL 100 ML: 350 INJECTION, SOLUTION INTRAVENOUS at 21:00

## 2024-01-01 RX ADMIN — Medication 100 MG: at 06:49

## 2024-01-01 RX ADMIN — INSULIN HUMAN 2 UNITS: 100 INJECTION, SOLUTION PARENTERAL at 13:51

## 2024-01-01 RX ADMIN — SODIUM CHLORIDE 10 MG/HR: 9 INJECTION, SOLUTION INTRAVENOUS at 17:55

## 2024-01-01 RX ADMIN — IOHEXOL 80 ML: 350 INJECTION, SOLUTION INTRAVENOUS at 20:11

## 2024-01-01 RX ADMIN — HYDRALAZINE HYDROCHLORIDE 10 MG: 20 INJECTION, SOLUTION INTRAMUSCULAR; INTRAVENOUS at 00:12

## 2024-01-01 RX ADMIN — HYDRALAZINE HYDROCHLORIDE 10 MG: 20 INJECTION INTRAMUSCULAR; INTRAVENOUS at 08:54

## 2024-01-01 RX ADMIN — ONDANSETRON 4 MG: 2 INJECTION INTRAMUSCULAR; INTRAVENOUS at 13:15

## 2024-01-01 RX ADMIN — SODIUM CHLORIDE 5 MG/HR: 9 INJECTION, SOLUTION INTRAVENOUS at 02:42

## 2024-01-01 RX ADMIN — ONDANSETRON 4 MG: 2 INJECTION INTRAMUSCULAR; INTRAVENOUS at 01:18

## 2024-01-01 RX ADMIN — Medication 100 MG: at 05:21

## 2024-01-01 RX ADMIN — INSULIN HUMAN 2 UNITS: 100 INJECTION, SOLUTION PARENTERAL at 05:22

## 2024-01-01 RX ADMIN — INSULIN HUMAN 2 UNITS: 100 INJECTION, SOLUTION PARENTERAL at 00:09

## 2024-01-01 RX ADMIN — IOHEXOL 40 ML: 300 INJECTION, SOLUTION INTRAVENOUS at 22:00

## 2024-01-01 RX ADMIN — SENNOSIDES AND DOCUSATE SODIUM 2 TABLET: 50; 8.6 TABLET ORAL at 17:24

## 2024-01-01 RX ADMIN — POTASSIUM CHLORIDE 10 MEQ: 7.46 INJECTION, SOLUTION INTRAVENOUS at 01:42

## 2024-01-01 RX ADMIN — PANTOPRAZOLE SODIUM 40 MG: 40 INJECTION, POWDER, FOR SOLUTION INTRAVENOUS at 22:56

## 2024-01-01 RX ADMIN — PANTOPRAZOLE SODIUM 40 MG: 40 INJECTION, POWDER, FOR SOLUTION INTRAVENOUS at 05:07

## 2024-01-01 RX ADMIN — LEVETIRACETAM 1000 MG: 100 INJECTION, SOLUTION INTRAVENOUS at 05:33

## 2024-01-01 RX ADMIN — SODIUM CHLORIDE 10 MG/HR: 9 INJECTION, SOLUTION INTRAVENOUS at 20:42

## 2024-01-01 RX ADMIN — POTASSIUM CHLORIDE 10 MEQ: 7.46 INJECTION, SOLUTION INTRAVENOUS at 21:18

## 2024-01-01 RX ADMIN — HEPARIN SODIUM 14 UNITS/KG/HR: 5000 INJECTION, SOLUTION INTRAVENOUS at 03:17

## 2024-01-01 RX ADMIN — SODIUM CHLORIDE 5 MG/HR: 9 INJECTION, SOLUTION INTRAVENOUS at 05:35

## 2024-01-01 RX ADMIN — SENNOSIDES AND DOCUSATE SODIUM 2 TABLET: 50; 8.6 TABLET ORAL at 17:34

## 2024-01-01 RX ADMIN — LABETALOL HYDROCHLORIDE 20 MG: 5 INJECTION, SOLUTION INTRAVENOUS at 00:29

## 2024-01-01 RX ADMIN — LABETALOL HYDROCHLORIDE 20 MG: 5 INJECTION, SOLUTION INTRAVENOUS at 20:15

## 2024-01-01 RX ADMIN — LEVETIRACETAM 1000 MG: 100 INJECTION, SOLUTION INTRAVENOUS at 18:29

## 2024-01-01 RX ADMIN — HEPARIN SODIUM 12 UNITS/KG/HR: 5000 INJECTION, SOLUTION INTRAVENOUS at 12:12

## 2024-01-01 RX ADMIN — INSULIN HUMAN 2 UNITS: 100 INJECTION, SOLUTION PARENTERAL at 05:48

## 2024-01-01 RX ADMIN — LABETALOL HYDROCHLORIDE 10 MG: 5 INJECTION, SOLUTION INTRAVENOUS at 10:20

## 2024-01-01 RX ADMIN — NOREPINEPHRINE BITARTRATE 0.05 MCG/KG/MIN: 1 SOLUTION INTRAVENOUS at 00:10

## 2024-01-01 RX ADMIN — HYDRALAZINE HYDROCHLORIDE 20 MG: 20 INJECTION INTRAMUSCULAR; INTRAVENOUS at 08:24

## 2024-01-01 RX ADMIN — PANTOPRAZOLE SODIUM 40 MG: 40 INJECTION, POWDER, FOR SOLUTION INTRAVENOUS at 17:33

## 2024-01-01 RX ADMIN — SODIUM CHLORIDE 500 ML: 9 INJECTION, SOLUTION INTRAVENOUS at 22:01

## 2024-01-01 RX ADMIN — Medication 100 MG: at 05:28

## 2024-01-01 RX ADMIN — HYDRALAZINE HYDROCHLORIDE 20 MG: 20 INJECTION, SOLUTION INTRAMUSCULAR; INTRAVENOUS at 21:44

## 2024-01-01 RX ADMIN — INSULIN HUMAN 2 UNITS: 100 INJECTION, SOLUTION PARENTERAL at 12:29

## 2024-01-01 RX ADMIN — IOHEXOL 40 ML: 350 INJECTION, SOLUTION INTRAVENOUS at 20:13

## 2024-01-01 RX ADMIN — PANTOPRAZOLE SODIUM 40 MG: 40 INJECTION, POWDER, FOR SOLUTION INTRAVENOUS at 05:28

## 2024-01-01 RX ADMIN — LABETALOL HYDROCHLORIDE 10 MG: 5 INJECTION, SOLUTION INTRAVENOUS at 02:06

## 2024-01-01 RX ADMIN — HYDRALAZINE HYDROCHLORIDE 10 MG: 20 INJECTION INTRAMUSCULAR; INTRAVENOUS at 08:36

## 2024-01-01 RX ADMIN — LEVETIRACETAM 1000 MG: 100 INJECTION, SOLUTION INTRAVENOUS at 01:05

## 2024-01-01 RX ADMIN — LABETALOL HYDROCHLORIDE 10 MG: 5 INJECTION, SOLUTION INTRAVENOUS at 09:40

## 2024-01-01 RX ADMIN — SODIUM CHLORIDE 5 MG/HR: 9 INJECTION, SOLUTION INTRAVENOUS at 23:57

## 2024-01-01 RX ADMIN — HYDRALAZINE HYDROCHLORIDE 20 MG: 20 INJECTION INTRAMUSCULAR; INTRAVENOUS at 22:34

## 2024-01-01 RX ADMIN — ATORVASTATIN CALCIUM 80 MG: 80 TABLET, FILM COATED ORAL at 17:34

## 2024-01-01 ASSESSMENT — PAIN DESCRIPTION - PAIN TYPE
TYPE: ACUTE PAIN

## 2024-01-01 ASSESSMENT — COGNITIVE AND FUNCTIONAL STATUS - GENERAL
MOVING TO AND FROM BED TO CHAIR: TOTAL
SUGGESTED CMS G CODE MODIFIER MOBILITY: CM
CLIMB 3 TO 5 STEPS WITH RAILING: TOTAL
DRESSING REGULAR LOWER BODY CLOTHING: TOTAL
DAILY ACTIVITIY SCORE: 10
TURNING FROM BACK TO SIDE WHILE IN FLAT BAD: A LOT
HELP NEEDED FOR BATHING: A LOT
MOVING FROM LYING ON BACK TO SITTING ON SIDE OF FLAT BED: A LOT
MOBILITY SCORE: 9
DAILY ACTIVITIY SCORE: 9
SUGGESTED CMS G CODE MODIFIER DAILY ACTIVITY: CL
WALKING IN HOSPITAL ROOM: TOTAL
TOILETING: TOTAL
TURNING FROM BACK TO SIDE WHILE IN FLAT BAD: A LOT
PERSONAL GROOMING: A LOT
EATING MEALS: A LOT
TOILETING: TOTAL
SUGGESTED CMS G CODE MODIFIER MOBILITY: CM
MOBILITY SCORE: 8
WALKING IN HOSPITAL ROOM: TOTAL
DRESSING REGULAR UPPER BODY CLOTHING: A LOT
PERSONAL GROOMING: A LOT
MOVING TO AND FROM BED TO CHAIR: TOTAL
CLIMB 3 TO 5 STEPS WITH RAILING: TOTAL
DRESSING REGULAR LOWER BODY CLOTHING: TOTAL
HELP NEEDED FOR BATHING: TOTAL
EATING MEALS: A LOT
STANDING UP FROM CHAIR USING ARMS: TOTAL
DRESSING REGULAR UPPER BODY CLOTHING: A LOT
SUGGESTED CMS G CODE MODIFIER DAILY ACTIVITY: CL
STANDING UP FROM CHAIR USING ARMS: A LOT
MOVING FROM LYING ON BACK TO SITTING ON SIDE OF FLAT BED: A LOT

## 2024-01-01 ASSESSMENT — ENCOUNTER SYMPTOMS
WEAKNESS: 1
HEADACHES: 0
VOMITING: 0
FEVER: 0
PALPITATIONS: 0
CHILLS: 0
SENSORY CHANGE: 0
SHORTNESS OF BREATH: 0
NAUSEA: 0
TINGLING: 0
SPEECH CHANGE: 0

## 2024-01-01 ASSESSMENT — COPD QUESTIONNAIRES
DURING THE PAST 4 WEEKS HOW MUCH DID YOU FEEL SHORT OF BREATH: NONE/LITTLE OF THE TIME
DO YOU EVER COUGH UP ANY MUCUS OR PHLEGM?: NO/ONLY WITH OCCASIONAL COLDS OR INFECTIONS
COPD SCREENING SCORE: 2
HAVE YOU SMOKED AT LEAST 100 CIGARETTES IN YOUR ENTIRE LIFE: NO/DON'T KNOW

## 2024-01-01 ASSESSMENT — GAIT ASSESSMENTS
GAIT LEVEL OF ASSIST: UNABLE TO PARTICIPATE
GAIT LEVEL OF ASSIST: UNABLE TO PARTICIPATE

## 2024-01-01 ASSESSMENT — ACTIVITIES OF DAILY LIVING (ADL): TOILETING: INDEPENDENT

## 2024-01-01 ASSESSMENT — FIBROSIS 4 INDEX
FIB4 SCORE: 1.16
FIB4 SCORE: 1.29
FIB4 SCORE: 1.16

## 2024-01-17 ENCOUNTER — APPOINTMENT (RX ONLY)
Dept: URBAN - METROPOLITAN AREA CLINIC 31 | Facility: CLINIC | Age: 79
Setting detail: DERMATOLOGY
End: 2024-01-17

## 2024-01-17 PROBLEM — D48.5 NEOPLASM OF UNCERTAIN BEHAVIOR OF SKIN: Status: ACTIVE | Noted: 2024-01-17

## 2024-01-17 PROCEDURE — 11102 TANGNTL BX SKIN SINGLE LES: CPT

## 2024-01-17 PROCEDURE — ? BIOPSY BY SHAVE METHOD

## 2024-02-16 ENCOUNTER — APPOINTMENT (RX ONLY)
Dept: URBAN - METROPOLITAN AREA CLINIC 31 | Facility: CLINIC | Age: 79
Setting detail: DERMATOLOGY
End: 2024-02-16

## 2024-02-16 DIAGNOSIS — L57.0 ACTINIC KERATOSIS: ICD-10-CM

## 2024-02-16 DIAGNOSIS — D485 NEOPLASM OF UNCERTAIN BEHAVIOR OF SKIN: ICD-10-CM

## 2024-02-16 PROBLEM — D48.5 NEOPLASM OF UNCERTAIN BEHAVIOR OF SKIN: Status: ACTIVE | Noted: 2024-02-16

## 2024-02-16 PROBLEM — C44.42 SQUAMOUS CELL CARCINOMA OF SKIN OF SCALP AND NECK: Status: ACTIVE | Noted: 2024-02-16

## 2024-02-16 PROCEDURE — 11102 TANGNTL BX SKIN SINGLE LES: CPT | Mod: 59

## 2024-02-16 PROCEDURE — 11103 TANGNTL BX SKIN EA SEP/ADDL: CPT

## 2024-02-16 PROCEDURE — 15220 FTH/GFT FR S/A/L 20 SQ CM/<: CPT

## 2024-02-16 PROCEDURE — 88331 PATH CONSLTJ SURG 1 BLK 1SPC: CPT | Mod: 59

## 2024-02-16 PROCEDURE — 17312 MOHS ADDL STAGE: CPT

## 2024-02-16 PROCEDURE — 99213 OFFICE O/P EST LOW 20 MIN: CPT | Mod: 25

## 2024-02-16 PROCEDURE — ? MEDICATION COUNSELING

## 2024-02-16 PROCEDURE — ? COUNSELING

## 2024-02-16 PROCEDURE — ? PRESCRIPTION

## 2024-02-16 PROCEDURE — ? MOHS SURGERY

## 2024-02-16 PROCEDURE — 13121 CMPLX RPR S/A/L 2.6-7.5 CM: CPT | Mod: 59

## 2024-02-16 PROCEDURE — ? ADDITIONAL NOTES

## 2024-02-16 PROCEDURE — ? BIOPSY BY SHAVE METHOD WITH FROZEN SECTION

## 2024-02-16 PROCEDURE — 17311 MOHS 1 STAGE H/N/HF/G: CPT

## 2024-02-16 RX ORDER — FLUOROURACIL 5 MG/G
CREAM TOPICAL
Qty: 40 | Refills: 0 | Status: CANCELLED
Stop reason: SDUPTHER

## 2024-02-16 RX ORDER — DOXYCYCLINE HYCLATE 100 MG/1
CAPSULE, GELATIN COATED ORAL BID
Qty: 20 | Refills: 0 | Status: ERX | COMMUNITY
Start: 2024-02-16

## 2024-02-16 RX ADMIN — DOXYCYCLINE HYCLATE: 100 CAPSULE, GELATIN COATED ORAL at 00:00

## 2024-02-16 ASSESSMENT — LOCATION SIMPLE DESCRIPTION DERM
LOCATION SIMPLE: LEFT FOREHEAD
LOCATION SIMPLE: SCALP

## 2024-02-16 ASSESSMENT — LOCATION DETAILED DESCRIPTION DERM
LOCATION DETAILED: LEFT SUPERIOR MEDIAL FOREHEAD
LOCATION DETAILED: LEFT CENTRAL PARIETAL SCALP

## 2024-02-16 ASSESSMENT — LOCATION ZONE DERM
LOCATION ZONE: FACE
LOCATION ZONE: SCALP

## 2024-02-16 NOTE — PROCEDURE: BIOPSY BY SHAVE METHOD WITH FROZEN SECTION
Detail Level: Detailed
Depth Of Biopsy: dermis
Biopsy Type: Frozen Section
Biopsy Method: Dermablade
Anesthesia Type: 1% lidocaine with epinephrine
Anesthesia Volume In Cc: 0.5
Additional Anesthesia Volume In Cc (Will Not Render If 0): 0
Hemostasis: Drysol
Wound Care: Petrolatum
Use Enhanced Frozen Section Features: Yes
Enhanced Features Information: The enhanced features will allow you to make use of the built in histology library. In this enhanced mode you will not have to select a frozen section diagnosis as this will automatically be based on the chosen impression. The parent diagnosis will also be overridden if you select a different microscopic description. The enhanced features will allow you develop a small library of gross descriptions to use as well. If you prefer the old method please leave the Use Enhanced Frozen Section Features question set to No.
Number Of Blocks: 1
Processing Note: The specimen was frozen in the cryostat, sectioned and stained.
Microscopic Override (Will Default To Parent Dx If Left Blank): AK and scar
Render Path Notes In Note?: No
Consent: Written consent was obtained and risks were reviewed including but not limited to scarring, infection, bleeding, scabbing, incomplete removal, nerve damage and allergy to anesthesia.
Post-Care Instructions: I reviewed with the patient in detail post-care instructions. Patient is to keep the biopsy site dry overnight, and then apply bacitracin twice daily until healed. Patient may apply hydrogen peroxide soaks to remove any crusting.
Notification Instructions: Patient will be notified of biopsy results. However, patient instructed to call the office if not contacted within 2 weeks.
Bcc Histology Text: There were numerous aggregates of basaloid cells.
Bcc Infiltrative Histology Text: There were numerous aggregates of basaloid cells demonstrating an infiltrative pattern.
Billing Type: Third-Party Bill
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

## 2024-02-16 NOTE — PROCEDURE: MEDICATION COUNSELING
Cellcept Pregnancy And Lactation Text: This medication is Pregnancy Category D and isn't considered safe during pregnancy. It is unknown if this medication is excreted in breast milk.
Cibinqo Counseling: I discussed with the patient the risks of Cibinqo therapy including but not limited to common cold, nausea, headache, cold sores, increased blood CPK levels, dizziness, UTIs, fatigue, acne, and vomitting. Live vaccines should be avoided.  This medication has been linked to serious infections; higher rate of mortality; malignancy and lymphoproliferative disorders; major adverse cardiovascular events; thrombosis; thrombocytopenia and lymphopenia; lipid elevations; and retinal detachment.
Soolantra Pregnancy And Lactation Text: This medication is Pregnancy Category C. This medication is considered safe during breast feeding.
Sotyktu Pregnancy And Lactation Text: There is insufficient data to evaluate whether or not Sotyktu is safe to use during pregnancy.   It is not known if Sotyktu passes into breast milk and whether or not it is safe to use when breastfeeding.  
Minoxidil Pregnancy And Lactation Text: This medication has not been assigned a Pregnancy Risk Category but animal studies failed to show danger with the topical medication. It is unknown if the medication is excreted in breast milk.
Rifampin Counseling: I discussed with the patient the risks of rifampin including but not limited to liver damage, kidney damage, red-orange body fluids, nausea/vomiting and severe allergy.
Arava Counseling:  Patient counseled regarding adverse effects of Arava including but not limited to nausea, vomiting, abnormalities in liver function tests. Patients may develop mouth sores, rash, diarrhea, and abnormalities in blood counts. The patient understands that monitoring is required including LFTs and blood counts.  There is a rare possibility of scarring of the liver and lung problems that can occur when taking methotrexate. Persistent nausea, loss of appetite, pale stools, dark urine, cough, and shortness of breath should be reported immediately. Patient advised to discontinue Arava treatment and consult with a physician prior to attempting conception. The patient will have to undergo a treatment to eliminate Arava from the body prior to conception.
Skyrizi Pregnancy And Lactation Text: The risk during pregnancy and breastfeeding is uncertain with this medication.
Eucrisa Counseling: Patient may experience a mild burning sensation during topical application. Eucrisa is not approved in children less than 2 years of age.
Gabapentin Pregnancy And Lactation Text: This medication is Pregnancy Category C and isn't considered safe during pregnancy. It is excreted in breast milk.
SSKI Counseling:  I discussed with the patient the risks of SSKI including but not limited to thyroid abnormalities, metallic taste, GI upset, fever, headache, acne, arthralgias, paraesthesias, lymphadenopathy, easy bleeding, arrhythmias, and allergic reaction.
Carac Pregnancy And Lactation Text: This medication is Pregnancy Category X and contraindicated in pregnancy and in women who may become pregnant. It is unknown if this medication is excreted in breast milk.
Ivermectin Counseling:  Patient instructed to take medication on an empty stomach with a full glass of water.  Patient informed of potential adverse effects including but not limited to nausea, diarrhea, dizziness, itching, and swelling of the extremities or lymph nodes.  The patient verbalized understanding of the proper use and possible adverse effects of ivermectin.  All of the patient's questions and concerns were addressed.
Doxycycline Pregnancy And Lactation Text: This medication is Pregnancy Category D and not consider safe during pregnancy. It is also excreted in breast milk but is considered safe for shorter treatment courses.
Protopic Counseling: Patient may experience a mild burning sensation during topical application. Protopic is not approved in children less than 2 years of age. There have been case reports of hematologic and skin malignancies in patients using topical calcineurin inhibitors although causality is questionable.
High Dose Vitamin A Counseling: Side effects reviewed, pt to contact office should one occur.
Griseofulvin Pregnancy And Lactation Text: This medication is Pregnancy Category X and is known to cause serious birth defects. It is unknown if this medication is excreted in breast milk but breast feeding should be avoided.
Infliximab Counseling:  I discussed with the patient the risks of infliximab including but not limited to myelosuppression, immunosuppression, autoimmune hepatitis, demyelinating diseases, lymphoma, and serious infections.  The patient understands that monitoring is required including a PPD at baseline and must alert us or the primary physician if symptoms of infection or other concerning signs are noted.
VTAMA Counseling: I discussed with the patient that VTAMA is not for use in the eyes, mouth or mouth. They should call the office if they develop any signs of allergic reactions to VTAMA. The patient verbalized understanding of the proper use and possible adverse effects of VTAMA.  All of the patient's questions and concerns were addressed.
Erivedge Counseling- I discussed with the patient the risks of Erivedge including but not limited to nausea, vomiting, diarrhea, constipation, weight loss, changes in the sense of taste, decreased appetite, muscle spasms, and hair loss.  The patient verbalized understanding of the proper use and possible adverse effects of Erivedge.  All of the patient's questions and concerns were addressed.
Topical Metronidazole Pregnancy And Lactation Text: This medication is Pregnancy Category B and considered safe during pregnancy.  It is also considered safe to use while breastfeeding.
Azithromycin Counseling:  I discussed with the patient the risks of azithromycin including but not limited to GI upset, allergic reaction, drug rash, diarrhea, and yeast infections.
Nsaids Pregnancy And Lactation Text: These medications are considered safe up to 30 weeks gestation. It is excreted in breast milk.
Cyclophosphamide Counseling:  I discussed with the patient the risks of cyclophosphamide including but not limited to hair loss, hormonal abnormalities, decreased fertility, abdominal pain, diarrhea, nausea and vomiting, bone marrow suppression and infection. The patient understands that monitoring is required while taking this medication.
Cosentyx Pregnancy And Lactation Text: This medication is Pregnancy Category B and is considered safe during pregnancy. It is unknown if this medication is excreted in breast milk.
Finasteride Male Counseling: Finasteride Counseling:  I discussed with the patient the risks of use of finasteride including but not limited to decreased libido, decreased ejaculate volume, gynecomastia, and depression. Women should not handle medication.  All of the patient's questions and concerns were addressed.
Mirvaso Counseling: Mirvaso is a topical medication which can decrease superficial blood flow where applied. Side effects are uncommon and include stinging, redness and allergic reactions.
Xeljanz Counseling: I discussed with the patient the risks of Xeljanz therapy including increased risk of infection, liver issues, headache, diarrhea, or cold symptoms. Live vaccines should be avoided. They were instructed to call if they have any problems.
Topical Retinoid counseling:  Patient advised to apply a pea-sized amount only at bedtime and wait 30 minutes after washing their face before applying.  If too drying, patient may add a non-comedogenic moisturizer. The patient verbalized understanding of the proper use and possible adverse effects of retinoids.  All of the patient's questions and concerns were addressed.
Cimetidine Counseling:  I discussed with the patient the risks of Cimetidine including but not limited to gynecomastia, headache, diarrhea, nausea, drowsiness, arrhythmias, pancreatitis, skin rashes, psychosis, bone marrow suppression and kidney toxicity.
Arava Pregnancy And Lactation Text: This medication is Pregnancy Category X and is absolutely contraindicated during pregnancy. It is unknown if it is excreted in breast milk.
Protopic Pregnancy And Lactation Text: This medication is Pregnancy Category C. It is unknown if this medication is excreted in breast milk when applied topically.
High Dose Vitamin A Pregnancy And Lactation Text: High dose vitamin A therapy is contraindicated during pregnancy and breast feeding.
Cibinqo Pregnancy And Lactation Text: It is unknown if this medication will adversely affect pregnancy or breast feeding.  You should not take this medication if you are currently pregnant or planning a pregnancy or while breastfeeding.
Glycopyrrolate Counseling:  I discussed with the patient the risks of glycopyrrolate including but not limited to skin rash, drowsiness, dry mouth, difficulty urinating, and blurred vision.
Rifampin Pregnancy And Lactation Text: This medication is Pregnancy Category C and it isn't know if it is safe during pregnancy. It is also excreted in breast milk and should not be used if you are breast feeding.
Erythromycin Counseling:  I discussed with the patient the risks of erythromycin including but not limited to GI upset, allergic reaction, drug rash, diarrhea, increase in liver enzymes, and yeast infections.
Ivermectin Pregnancy And Lactation Text: This medication is Pregnancy Category C and it isn't known if it is safe during pregnancy. It is also excreted in breast milk.
Sski Pregnancy And Lactation Text: This medication is Pregnancy Category D and isn't considered safe during pregnancy. It is excreted in breast milk.
Stelara Counseling:  I discussed with the patient the risks of ustekinumab including but not limited to immunosuppression, malignancy, posterior leukoencephalopathy syndrome, and serious infections.  The patient understands that monitoring is required including a PPD at baseline and must alert us or the primary physician if symptoms of infection or other concerning signs are noted.
Calcipotriene Counseling:  I discussed with the patient the risks of calcipotriene including but not limited to erythema, scaling, itching, and irritation.
Azithromycin Pregnancy And Lactation Text: This medication is considered safe during pregnancy and is also secreted in breast milk.
Vtama Pregnancy And Lactation Text: It is unknown if this medication can cause problems during pregnancy and breastfeeding.
Itraconazole Counseling:  I discussed with the patient the risks of itraconazole including but not limited to liver damage, nausea/vomiting, neuropathy, and severe allergy.  The patient understands that this medication is best absorbed when taken with acidic beverages such as non-diet cola or ginger ale.  The patient understands that monitoring is required including baseline LFTs and repeat LFTs at intervals.  The patient understands that they are to contact us or the primary physician if concerning signs are noted.
Dupixent Counseling: I discussed with the patient the risks of dupilumab including but not limited to eye infection and irritation, cold sores, injection site reactions, worsening of asthma, allergic reactions and increased risk of parasitic infection.  Live vaccines should be avoided while taking dupilumab. Dupilumab will also interact with certain medications such as warfarin and cyclosporine. The patient understands that monitoring is required and they must alert us or the primary physician if symptoms of infection or other concerning signs are noted.
Topical Steroids Counseling: I discussed with the patient that prolonged use of topical steroids can result in the increased appearance of superficial blood vessels (telangiectasias), lightening (hypopigmentation) and thinning of the skin (atrophy).  Patient understands to avoid using high potency steroids in skin folds, the groin or the face.  The patient verbalized understanding of the proper use and possible adverse effects of topical steroids.  All of the patient's questions and concerns were addressed.
Cyclophosphamide Pregnancy And Lactation Text: This medication is Pregnancy Category D and it isn't considered safe during pregnancy. This medication is excreted in breast milk.
Glycopyrrolate Pregnancy And Lactation Text: This medication is Pregnancy Category B and is considered safe during pregnancy. It is unknown if it is excreted breast milk.
Xelanna mariez Pregnancy And Lactation Text: This medication is Pregnancy Category D and is not considered safe during pregnancy.  The risk during breast feeding is also uncertain.
Olanzapine Counseling- I discussed with the patient the common side effects of olanzapine including but are not limited to: lack of energy, dry mouth, increased appetite, sleepiness, tremor, constipation, dizziness, changes in behavior, or restlessness.  Explained that teenagers are more likely to experience headaches, abdominal pain, pain in the arms or legs, tiredness, and sleepiness.  Serious side effects include but are not limited: increased risk of death in elderly patients who are confused, have memory loss, or dementia-related psychosis; hyperglycemia; increased cholesterol and triglycerides; and weight gain.
Sarecycline Counseling: Patient advised regarding possible photosensitivity and discoloration of the teeth, skin, lips, tongue and gums.  Patient instructed to avoid sunlight, if possible.  When exposed to sunlight, patients should wear protective clothing, sunglasses, and sunscreen.  The patient was instructed to call the office immediately if the following severe adverse effects occur:  hearing changes, easy bruising/bleeding, severe headache, or vision changes.  The patient verbalized understanding of the proper use and possible adverse effects of sarecycline.  All of the patient's questions and concerns were addressed.
Finasteride Female Counseling: Finasteride Counseling:  I discussed with the patient the risks of use of finasteride including but not limited to decreased libido and sexual dysfunction. Explained the teratogenic nature of the medication and stressed the importance of not getting pregnant during treatment. All of the patient's questions and concerns were addressed.
Calcipotriene Pregnancy And Lactation Text: The use of this medication during pregnancy or lactation is not recommended as there is insufficient data.
Topical Retinoid Pregnancy And Lactation Text: This medication is Pregnancy Category C. It is unknown if this medication is excreted in breast milk.
Litfulo Counseling: I discussed with the patient the risks of Litfulo therapy including but not limited to upper respiratory tract infections, shingles, cold sores, and nausea. Live vaccines should be avoided.  This medication has been linked to serious infections; higher rate of mortality; malignancy and lymphoproliferative disorders; major adverse cardiovascular events; thrombosis; gastrointestinal perforations; neutropenia; lymphopenia; anemia; liver enzyme elevations; and lipid elevations.
Cimetidine Pregnancy And Lactation Text: This medication is Pregnancy Category B and is considered safe during pregnancy. It is also excreted in breast milk and breast feeding isn't recommended.
Qbrexza Counseling:  I discussed with the patient the risks of Qbrexza including but not limited to headache, mydriasis, blurred vision, dry eyes, nasal dryness, dry mouth, dry throat, dry skin, urinary hesitation, and constipation.  Local skin reactions including erythema, burning, stinging, and itching can also occur.
Mirvaso Pregnancy And Lactation Text: This medication has not been assigned a Pregnancy Risk Category. It is unknown if the medication is excreted in breast milk.
Thalidomide Counseling: I discussed with the patient the risks of thalidomide including but not limited to birth defects, anxiety, weakness, chest pain, dizziness, cough and severe allergy.
Zoryve Counseling:  I discussed with the patient that Zoryve is not for use in the eyes, mouth or vagina. The most commonly reported side effects include diarrhea, headache, insomnia, application site pain, upper respiratory tract infections, and urinary tract infections.  All of the patient's questions and concerns were addressed.
Itraconazole Pregnancy And Lactation Text: This medication is Pregnancy Category C and it isn't know if it is safe during pregnancy. It is also excreted in breast milk.
Erythromycin Pregnancy And Lactation Text: This medication is Pregnancy Category B and is considered safe during pregnancy. It is also excreted in breast milk.
Clofazimine Counseling:  I discussed with the patient the risks of clofazimine including but not limited to skin and eye pigmentation, liver damage, nausea/vomiting, gastrointestinal bleeding and allergy.
Hydroquinone Counseling:  Patient advised that medication may result in skin irritation, lightening (hypopigmentation), dryness, and burning.  In the event of skin irritation, the patient was advised to reduce the amount of the drug applied or use it less frequently.  Rarely, spots that are treated with hydroquinone can become darker (pseudoochronosis).  Should this occur, patient instructed to stop medication and call the office. The patient verbalized understanding of the proper use and possible adverse effects of hydroquinone.  All of the patient's questions and concerns were addressed.
Cantharidin Counseling:  I discussed with the patient the risks of Cantharidin including but not limited to pain, redness, burning, itching, and blistering.
Rituxan Counseling:  I discussed with the patient the risks of Rituxan infusions. Side effects can include infusion reactions, severe drug rashes including mucocutaneous reactions, reactivation of latent hepatitis and other infections and rarely progressive multifocal leukoencephalopathy.  All of the patient's questions and concerns were addressed.
Aklief counseling:  Patient advised to apply a pea-sized amount only at bedtime and wait 30 minutes after washing their face before applying.  If too drying, patient may add a non-comedogenic moisturizer.  The most commonly reported side effects including irritation, redness, scaling, dryness, stinging, burning, itching, and increased risk of sunburn.  The patient verbalized understanding of the proper use and possible adverse effects of retinoids.  All of the patient's questions and concerns were addressed.
Bactrim Counseling:  I discussed with the patient the risks of sulfa antibiotics including but not limited to GI upset, allergic reaction, drug rash, diarrhea, dizziness, photosensitivity, and yeast infections.  Rarely, more serious reactions can occur including but not limited to aplastic anemia, agranulocytosis, methemoglobinemia, blood dyscrasias, liver or kidney failure, lung infiltrates or desquamative/blistering drug rashes.
Dupixent Pregnancy And Lactation Text: This medication likely crosses the placenta but the risk for the fetus is uncertain. This medication is excreted in breast milk.
Libtayo Counseling- I discussed with the patient the risks of Libtayo including but not limited to nausea, vomiting, diarrhea, and bone or muscle pain.  The patient verbalized understanding of the proper use and possible adverse effects of Libtayo.  All of the patient's questions and concerns were addressed.
Cyclosporine Counseling:  I discussed with the patient the risks of cyclosporine including but not limited to hypertension, gingival hyperplasia,myelosuppression, immunosuppression, liver damage, kidney damage, neurotoxicity, lymphoma, and serious infections. The patient understands that monitoring is required including baseline blood pressure, CBC, CMP, lipid panel and uric acid, and then 1-2 times monthly CMP and blood pressure.
Topical Steroids Applications Pregnancy And Lactation Text: Most topical steroids are considered safe to use during pregnancy and lactation.  Any topical steroid applied to the breast or nipple should be washed off before breastfeeding.
Hydroxychloroquine Counseling:  I discussed with the patient that a baseline ophthalmologic exam is needed at the start of therapy and every year thereafter while on therapy. A CBC may also be warranted for monitoring.  The side effects of this medication were discussed with the patient, including but not limited to agranulocytosis, aplastic anemia, seizures, rashes, retinopathy, and liver toxicity. Patient instructed to call the office should any adverse effect occur.  The patient verbalized understanding of the proper use and possible adverse effects of Plaquenil.  All the patient's questions and concerns were addressed.
Tazorac Counseling:  Patient advised that medication is irritating and drying.  Patient may need to apply sparingly and wash off after an hour before eventually leaving it on overnight.  The patient verbalized understanding of the proper use and possible adverse effects of tazorac.  All of the patient's questions and concerns were addressed.
Cantharidin Pregnancy And Lactation Text: This medication has not been proven safe during pregnancy. It is unknown if this medication is excreted in breast milk.
Olanzapine Pregnancy And Lactation Text: This medication is pregnancy category C.   There are no adequate and well controlled trials with olanzapine in pregnant females.  Olanzapine should be used during pregnancy only if the potential benefit justifies the potential risk to the fetus.   In a study in lactating healthy women, olanzapine was excreted in breast milk.  It is recommended that women taking olanzapine should not breast feed.
Finasteride Pregnancy And Lactation Text: This medication is absolutely contraindicated during pregnancy. It is unknown if it is excreted in breast milk.
Sarecycline Pregnancy And Lactation Text: This medication is Pregnancy Category D and not consider safe during pregnancy. It is also excreted in breast milk.
Litfulo Pregnancy And Lactation Text: Based on animal studies, Lifulo may cause embryo-fetal harm when administered to pregnant women.  The medication should not be used in pregnancy.  Breastfeeding is not recommended during treatment.
Taltz Counseling: I discussed with the patient the risks of ixekizumab including but not limited to immunosuppression, serious infections, worsening of inflammatory bowel disease and drug reactions.  The patient understands that monitoring is required including a PPD at baseline and must alert us or the primary physician if symptoms of infection or other concerning signs are noted.
Opzelura Counseling:  I discussed with the patient the risks of Opzelura including but not limited to nasopharngitis, bronchitis, ear infection, eosinophila, hives, diarrhea, folliculitis, tonsillitis, and rhinorrhea.  Taken orally, this medication has been linked to serious infections; higher rate of mortality; malignancy and lymphoproliferative disorders; major adverse cardiovascular events; thrombosis; thrombocytopenia, anemia, and neutropenia; and lipid elevations.
Adbry Counseling: I discussed with the patient the risks of tralokinumab including but not limited to eye infection and irritation, cold sores, injection site reactions, worsening of asthma, allergic reactions and increased risk of parasitic infection.  Live vaccines should be avoided while taking tralokinumab. The patient understands that monitoring is required and they must alert us or the primary physician if symptoms of infection or other concerning signs are noted.
Doxepin Counseling:  Patient advised that the medication is sedating and not to drive a car after taking this medication. Patient informed of potential adverse effects including but not limited to dry mouth, urinary retention, and blurry vision.  The patient verbalized understanding of the proper use and possible adverse effects of doxepin.  All of the patient's questions and concerns were addressed.
Qbrexza Pregnancy And Lactation Text: There is no available data on Qbrexza use in pregnant women.  There is no available data on Qbrexza use in lactation.
Ketoconazole Counseling:   Patient counseled regarding improving absorption with orange juice.  Adverse effects include but are not limited to breast enlargement, headache, diarrhea, nausea, upset stomach, liver function test abnormalities, taste disturbance, and stomach pain.  There is a rare possibility of liver failure that can occur when taking ketoconazole. The patient understands that monitoring of LFTs may be required, especially at baseline. The patient verbalized understanding of the proper use and possible adverse effects of ketoconazole.  All of the patient's questions and concerns were addressed.
Rituxan Pregnancy And Lactation Text: This medication is Pregnancy Category C and it isn't know if it is safe during pregnancy. It is unknown if this medication is excreted in breast milk but similar antibodies are known to be excreted.
Libtayo Pregnancy And Lactation Text: This medication is contraindicated in pregnancy and when breast feeding.
Acitretin Counseling:  I discussed with the patient the risks of acitretin including but not limited to hair loss, dry lips/skin/eyes, liver damage, hyperlipidemia, depression/suicidal ideation, photosensitivity.  Serious rare side effects can include but are not limited to pancreatitis, pseudotumor cerebri, bony changes, clot formation/stroke/heart attack.  Patient understands that alcohol is contraindicated since it can result in liver toxicity and significantly prolong the elimination of the drug by many years.
5-Fu Counseling: 5-Fluorouracil Counseling:  I discussed with the patient the risks of 5-fluorouracil including but not limited to erythema, scaling, itching, weeping, crusting, and pain.
Metronidazole Counseling:  I discussed with the patient the risks of metronidazole including but not limited to seizures, nausea/vomiting, a metallic taste in the mouth, nausea/vomiting and severe allergy.
Bactrim Pregnancy And Lactation Text: This medication is Pregnancy Category D and is known to cause fetal risk.  It is also excreted in breast milk.
Otezla Counseling: The side effects of Otezla were discussed with the patient, including but not limited to worsening or new depression, weight loss, diarrhea, nausea, upper respiratory tract infection, and headache. Patient instructed to call the office should any adverse effect occur.  The patient verbalized understanding of the proper use and possible adverse effects of Otezla.  All the patient's questions and concerns were addressed.
Oral Minoxidil Counseling- I discussed with the patient the risks of oral minoxidil including but not limited to shortness of breath, swelling of the feet or ankles, dizziness, lightheadedness, unwanted hair growth and allergic reaction.  The patient verbalized understanding of the proper use and possible adverse effects of oral minoxidil.  All of the patient's questions and concerns were addressed.
Birth Control Pills Counseling: Birth Control Pill Counseling: I discussed with the patient the potential side effects of OCPs including but not limited to increased risk of stroke, heart attack, thrombophlebitis, deep venous thrombosis, hepatic adenomas, breast changes, GI upset, headaches, and depression.  The patient verbalized understanding of the proper use and possible adverse effects of OCPs. All of the patient's questions and concerns were addressed.
Cyclosporine Pregnancy And Lactation Text: This medication is Pregnancy Category C and it isn't know if it is safe during pregnancy. This medication is excreted in breast milk.
Aklief Pregnancy And Lactation Text: It is unknown if this medication is safe to use during pregnancy.  It is unknown if this medication is excreted in breast milk.  Breastfeeding women should use the topical cream on the smallest area of the skin for the shortest time needed while breastfeeding.  Do not apply to nipple and areola.
Enbrel Counseling:  I discussed with the patient the risks of etanercept including but not limited to myelosuppression, immunosuppression, autoimmune hepatitis, demyelinating diseases, lymphoma, and infections.  The patient understands that monitoring is required including a PPD at baseline and must alert us or the primary physician if symptoms of infection or other concerning signs are noted.
Topical Sulfur Applications Counseling: Topical Sulfur Counseling: Patient counseled that this medication may cause skin irritation or allergic reactions.  In the event of skin irritation, the patient was advised to reduce the amount of the drug applied or use it less frequently.   The patient verbalized understanding of the proper use and possible adverse effects of topical sulfur application.  All of the patient's questions and concerns were addressed.
Adbry Pregnancy And Lactation Text: It is unknown if this medication will adversely affect pregnancy or breast feeding.
Tazorac Pregnancy And Lactation Text: This medication is not safe during pregnancy. It is unknown if this medication is excreted in breast milk.
Colchicine Counseling:  Patient counseled regarding adverse effects including but not limited to stomach upset (nausea, vomiting, stomach pain, or diarrhea).  Patient instructed to limit alcohol consumption while taking this medication.  Colchicine may reduce blood counts especially with prolonged use.  The patient understands that monitoring of kidney function and blood counts may be required, especially at baseline. The patient verbalized understanding of the proper use and possible adverse effects of colchicine.  All of the patient's questions and concerns were addressed.
Rhofade Counseling: Rhofade is a topical medication which can decrease superficial blood flow where applied. Side effects are uncommon and include stinging, redness and allergic reactions.
Doxepin Pregnancy And Lactation Text: This medication is Pregnancy Category C and it isn't known if it is safe during pregnancy. It is also excreted in breast milk and breast feeding isn't recommended.
Hydroxychloroquine Pregnancy And Lactation Text: This medication has been shown to cause fetal harm but it isn't assigned a Pregnancy Risk Category. There are small amounts excreted in breast milk.
Tetracycline Counseling: Patient counseled regarding possible photosensitivity and increased risk for sunburn.  Patient instructed to avoid sunlight, if possible.  When exposed to sunlight, patients should wear protective clothing, sunglasses, and sunscreen.  The patient was instructed to call the office immediately if the following severe adverse effects occur:  hearing changes, easy bruising/bleeding, severe headache, or vision changes.  The patient verbalized understanding of the proper use and possible adverse effects of tetracycline.  All of the patient's questions and concerns were addressed. Patient understands to avoid pregnancy while on therapy due to potential birth defects.
Imiquimod Counseling:  I discussed with the patient the risks of imiquimod including but not limited to erythema, scaling, itching, weeping, crusting, and pain.  Patient understands that the inflammatory response to imiquimod is variable from person to person and was educated regarded proper titration schedule.  If flu-like symptoms develop, patient knows to discontinue the medication and contact us.
Olumiant Counseling: I discussed with the patient the risks of Olumiant therapy including but not limited to upper respiratory tract infections, shingles, cold sores, and nausea. Live vaccines should be avoided.  This medication has been linked to serious infections; higher rate of mortality; malignancy and lymphoproliferative disorders; major adverse cardiovascular events; thrombosis; gastrointestinal perforations; neutropenia; lymphopenia; anemia; liver enzyme elevations; and lipid elevations.
Metronidazole Pregnancy And Lactation Text: This medication is Pregnancy Category B and considered safe during pregnancy.  It is also excreted in breast milk.
Tranexamic Acid Counseling:  Patient advised of the small risk of bleeding problems with tranexamic acid. They were also instructed to call if they developed any nausea, vomiting or diarrhea. All of the patient's questions and concerns were addressed.
Acitretin Pregnancy And Lactation Text: This medication is Pregnancy Category X and should not be given to women who are pregnant or may become pregnant in the future. This medication is excreted in breast milk.
Siliq Counseling:  I discussed with the patient the risks of Siliq including but not limited to new or worsening depression, suicidal thoughts and behavior, immunosuppression, malignancy, posterior leukoencephalopathy syndrome, and serious infections.  The patient understands that monitoring is required including a PPD at baseline and must alert us or the primary physician if symptoms of infection or other concerning signs are noted. There is also a special program designed to monitor depression which is required with Siliq.
Spironolactone Pregnancy And Lactation Text: This medication can cause feminization of the male fetus and should be avoided during pregnancy. The active metabolite is also found in breast milk.
Otezla Pregnancy And Lactation Text: This medication is Pregnancy Category C and it isn't known if it is safe during pregnancy. It is unknown if it is excreted in breast milk.
Oral Minoxidil Pregnancy And Lactation Text: This medication should only be used when clearly needed if you are pregnant, attempting to become pregnant or breast feeding.
Odomzo Counseling- I discussed with the patient the risks of Odomzo including but not limited to nausea, vomiting, diarrhea, constipation, weight loss, changes in the sense of taste, decreased appetite, muscle spasms, and hair loss.  The patient verbalized understanding of the proper use and possible adverse effects of Odomzo.  All of the patient's questions and concerns were addressed.
Methotrexate Counseling:  Patient counseled regarding adverse effects of methotrexate including but not limited to nausea, vomiting, abnormalities in liver function tests. Patients may develop mouth sores, rash, diarrhea, and abnormalities in blood counts. The patient understands that monitoring is required including LFT's and blood counts.  There is a rare possibility of scarring of the liver and lung problems that can occur when taking methotrexate. Persistent nausea, loss of appetite, pale stools, dark urine, cough, and shortness of breath should be reported immediately. Patient advised to discontinue methotrexate treatment at least three months before attempting to become pregnant.  I discussed the need for folate supplements while taking methotrexate.  These supplements can decrease side effects during methotrexate treatment. The patient verbalized understanding of the proper use and possible adverse effects of methotrexate.  All of the patient's questions and concerns were addressed.
Topical Sulfur Applications Pregnancy And Lactation Text: This medication is Pregnancy Category C and has an unknown safety profile during pregnancy. It is unknown if this topical medication is excreted in breast milk.
Azelaic Acid Counseling: Patient counseled that medicine may cause skin irritation and to avoid applying near the eyes.  In the event of skin irritation, the patient was advised to reduce the amount of the drug applied or use it less frequently.   The patient verbalized understanding of the proper use and possible adverse effects of azelaic acid.  All of the patient's questions and concerns were addressed.
Cephalexin Counseling: I counseled the patient regarding use of cephalexin as an antibiotic for prophylactic and/or therapeutic purposes. Cephalexin (commonly prescribed under brand name Keflex) is a cephalosporin antibiotic which is active against numerous classes of bacteria, including most skin bacteria. Side effects may include nausea, diarrhea, gastrointestinal upset, rash, hives, yeast infections, and in rare cases, hepatitis, kidney disease, seizures, fever, confusion, neurologic symptoms, and others. Patients with severe allergies to penicillin medications are cautioned that there is about a 10% incidence of cross-reactivity with cephalosporins. When possible, patients with penicillin allergies should use alternatives to cephalosporins for antibiotic therapy.
Zyclara Counseling:  I discussed with the patient the risks of imiquimod including but not limited to erythema, scaling, itching, weeping, crusting, and pain.  Patient understands that the inflammatory response to imiquimod is variable from person to person and was educated regarded proper titration schedule.  If flu-like symptoms develop, patient knows to discontinue the medication and contact us.
Birth Control Pills Pregnancy And Lactation Text: This medication should be avoided if pregnant and for the first 30 days post-partum.
Bimzelx Counseling:  I discussed with the patient the risks of Bimzelx including but not limited to depression, immunosuppression, allergic reactions and infections.  The patient understands that monitoring is required including a PPD at baseline and must alert us or the primary physician if symptoms of infection or other concerning signs are noted.
Topical Clindamycin Counseling: Patient counseled that this medication may cause skin irritation or allergic reactions.  In the event of skin irritation, the patient was advised to reduce the amount of the drug applied or use it less frequently.   The patient verbalized understanding of the proper use and possible adverse effects of clindamycin.  All of the patient's questions and concerns were addressed.
Hydroxyzine Counseling: Patient advised that the medication is sedating and not to drive a car after taking this medication.  Patient informed of potential adverse effects including but not limited to dry mouth, urinary retention, and blurry vision.  The patient verbalized understanding of the proper use and possible adverse effects of hydroxyzine.  All of the patient's questions and concerns were addressed.
Detail Level: Detailed
Olumiant Pregnancy And Lactation Text: Based on animal studies, Olumiant may cause embryo-fetal harm when administered to pregnant women.  The medication should not be used in pregnancy.  Breastfeeding is not recommended during treatment.
Low Dose Naltrexone Counseling- I discussed with the patient the potential risks and side effects of low dose naltrexone including but not limited to: more vivid dreams, headaches, nausea, vomiting, abdominal pain, fatigue, dizziness, and anxiety.
Minocycline Counseling: Patient advised regarding possible photosensitivity and discoloration of the teeth, skin, lips, tongue and gums.  Patient instructed to avoid sunlight, if possible.  When exposed to sunlight, patients should wear protective clothing, sunglasses, and sunscreen.  The patient was instructed to call the office immediately if the following severe adverse effects occur:  hearing changes, easy bruising/bleeding, severe headache, or vision changes.  The patient verbalized understanding of the proper use and possible adverse effects of minocycline.  All of the patient's questions and concerns were addressed.
Tranexamic Acid Pregnancy And Lactation Text: It is unknown if this medication is safe during pregnancy or breast feeding.
Oxybutynin Counseling:  I discussed with the patient the risks of oxybutynin including but not limited to skin rash, drowsiness, dry mouth, difficulty urinating, and blurred vision.
Tremfya Counseling: I discussed with the patient the risks of guselkumab including but not limited to immunosuppression, serious infections, and drug reactions.  The patient understands that monitoring is required including a PPD at baseline and must alert us or the primary physician if symptoms of infection or other concerning signs are noted.
Cephalexin Pregnancy And Lactation Text: This medication is Pregnancy Category B and considered safe during pregnancy.  It is also excreted in breast milk but can be used safely for shorter doses.
Drysol Counseling:  I discussed with the patient the risks of drysol/aluminum chloride including but not limited to skin rash, itching, irritation, burning.
Bexarotene Counseling:  I discussed with the patient the risks of bexarotene including but not limited to hair loss, dry lips/skin/eyes, liver abnormalities, hyperlipidemia, pancreatitis, depression/suicidal ideation, photosensitivity, drug rash/allergic reactions, hypothyroidism, anemia, leukopenia, infection, cataracts, and teratogenicity.  Patient understands that they will need regular blood tests to check lipid profile, liver function tests, white blood cell count, thyroid function tests and pregnancy test if applicable.
Humira Counseling:  I discussed with the patient the risks of adalimumab including but not limited to myelosuppression, immunosuppression, autoimmune hepatitis, demyelinating diseases, lymphoma, and serious infections.  The patient understands that monitoring is required including a PPD at baseline and must alert us or the primary physician if symptoms of infection or other concerning signs are noted.
Wartpeel Counseling:  I discussed with the patient the risks of Wartpeel including but not limited to erythema, scaling, itching, weeping, crusting, and pain.
Bimzelx Pregnancy And Lactation Text: This medication crosses the placenta and the safety is uncertain during pregnancy. It is unknown if this medication is present in breast milk.
Topical Clindamycin Pregnancy And Lactation Text: This medication is Pregnancy Category B and is considered safe during pregnancy. It is unknown if it is excreted in breast milk.
Azelaic Acid Pregnancy And Lactation Text: This medication is considered safe during pregnancy and breast feeding.
Methotrexate Pregnancy And Lactation Text: This medication is Pregnancy Category X and is known to cause fetal harm. This medication is excreted in breast milk.
Spironolactone Counseling: Patient advised regarding risks of diarrhea, abdominal pain, hyperkalemia, birth defects (for female patients), liver toxicity and renal toxicity. The patient may need blood work to monitor liver and kidney function and potassium levels while on therapy. The patient verbalized understanding of the proper use and possible adverse effects of spironolactone.  All of the patient's questions and concerns were addressed.
Low Dose Naltrexone Pregnancy And Lactation Text: Naltrexone is pregnancy category C.  There have been no adequate and well-controlled studies in pregnant women.  It should be used in pregnancy only if the potential benefit justifies the potential risk to the fetus.   Limited data indicates that naltrexone is minimally excreted into breastmilk.
Azathioprine Counseling:  I discussed with the patient the risks of azathioprine including but not limited to myelosuppression, immunosuppression, hepatotoxicity, lymphoma, and infections.  The patient understands that monitoring is required including baseline LFTs, Creatinine, possible TPMP genotyping and weekly CBCs for the first month and then every 2 weeks thereafter.  The patient verbalized understanding of the proper use and possible adverse effects of azathioprine.  All of the patient's questions and concerns were addressed.
Rinvoq Counseling: I discussed with the patient the risks of Rinvoq therapy including but not limited to upper respiratory tract infections, shingles, cold sores, bronchitis, nausea, cough, fever, acne, and headache. Live vaccines should be avoided.  This medication has been linked to serious infections; higher rate of mortality; malignancy and lymphoproliferative disorders; major adverse cardiovascular events; thrombosis; thrombocytopenia, anemia, and neutropenia; lipid elevations; liver enzyme elevations; and gastrointestinal perforations.
Hydroxyzine Pregnancy And Lactation Text: This medication is not safe during pregnancy and should not be taken. It is also excreted in breast milk and breast feeding isn't recommended.
Solaraze Counseling:  I discussed with the patient the risks of Solaraze including but not limited to erythema, scaling, itching, weeping, crusting, and pain.
Valtrex Counseling: I discussed with the patient the risks of valacyclovir including but not limited to kidney damage, nausea, vomiting and severe allergy.  The patient understands that if the infection seems to be worsening or is not improving, they are to call.
Include Pregnancy/Lactation Warning?: No
Opzelura Pregnancy And Lactation Text: There is insufficient data to evaluate drug-associated risk for major birth defects, miscarriage, or other adverse maternal or fetal outcomes.  There is a pregnancy registry that monitors pregnancy outcomes in pregnant persons exposed to the medication during pregnancy.  It is unknown if this medication is excreted in breast milk.  Do not breastfeed during treatment and for about 4 weeks after the last dose.
Bexarotene Pregnancy And Lactation Text: This medication is Pregnancy Category X and should not be given to women who are pregnant or may become pregnant. This medication should not be used if you are breast feeding.
Dapsone Counseling: I discussed with the patient the risks of dapsone including but not limited to hemolytic anemia, agranulocytosis, rashes, methemoglobinemia, kidney failure, peripheral neuropathy, headaches, GI upset, and liver toxicity.  Patients who start dapsone require monitoring including baseline LFTs and weekly CBCs for the first month, then every month thereafter.  The patient verbalized understanding of the proper use and possible adverse effects of dapsone.  All of the patient's questions and concerns were addressed.
Klisyri Counseling:  I discussed with the patient the risks of Klisyri including but not limited to erythema, scaling, itching, weeping, crusting, and pain.
Clindamycin Counseling: I counseled the patient regarding use of clindamycin as an antibiotic for prophylactic and/or therapeutic purposes. Clindamycin is active against numerous classes of bacteria, including skin bacteria. Side effects may include nausea, diarrhea, gastrointestinal upset, rash, hives, yeast infections, and in rare cases, colitis.
Simponi Counseling:  I discussed with the patient the risks of golimumab including but not limited to myelosuppression, immunosuppression, autoimmune hepatitis, demyelinating diseases, lymphoma, and serious infections.  The patient understands that monitoring is required including a PPD at baseline and must alert us or the primary physician if symptoms of infection or other concerning signs are noted.
Ketoconazole Pregnancy And Lactation Text: This medication is Pregnancy Category C and it isn't know if it is safe during pregnancy. It is also excreted in breast milk and breast feeding isn't recommended.
Benzoyl Peroxide Counseling: Patient counseled that medicine may cause skin irritation and bleach clothing.  In the event of skin irritation, the patient was advised to reduce the amount of the drug applied or use it less frequently.   The patient verbalized understanding of the proper use and possible adverse effects of benzoyl peroxide.  All of the patient's questions and concerns were addressed.
Fluconazole Counseling:  Patient counseled regarding adverse effects of fluconazole including but not limited to headache, diarrhea, nausea, upset stomach, liver function test abnormalities, taste disturbance, and stomach pain.  There is a rare possibility of liver failure that can occur when taking fluconazole.  The patient understands that monitoring of LFTs and kidney function test may be required, especially at baseline. The patient verbalized understanding of the proper use and possible adverse effects of fluconazole.  All of the patient's questions and concerns were addressed.
Prednisone Counseling:  I discussed with the patient the risks of prolonged use of prednisone including but not limited to weight gain, insomnia, osteoporosis, mood changes, diabetes, susceptibility to infection, glaucoma and high blood pressure.  In cases where prednisone use is prolonged, patients should be monitored with blood pressure checks, serum glucose levels and an eye exam.  Additionally, the patient may need to be placed on GI prophylaxis, PCP prophylaxis, and calcium and vitamin D supplementation and/or a bisphosphonate.  The patient verbalized understanding of the proper use and the possible adverse effects of prednisone.  All of the patient's questions and concerns were addressed.
Opioid Counseling: I discussed with the patient the potential side effects of opioids including but not limited to addiction, altered mental status, and depression. I stressed avoiding alcohol, benzodiazepines, muscle relaxants and sleep aids unless specifically okayed by a physician. The patient verbalized understanding of the proper use and possible adverse effects of opioids. All of the patient's questions and concerns were addressed. They were instructed to flush the remaining pills down the toilet if they did not need them for pain.
Cimzia Counseling:  I discussed with the patient the risks of Cimzia including but not limited to immunosuppression, allergic reactions and infections.  The patient understands that monitoring is required including a PPD at baseline and must alert us or the primary physician if symptoms of infection or other concerning signs are noted.
Topical Ketoconazole Counseling: Patient counseled that this medication may cause skin irritation or allergic reactions.  In the event of skin irritation, the patient was advised to reduce the amount of the drug applied or use it less frequently.   The patient verbalized understanding of the proper use and possible adverse effects of ketoconazole.  All of the patient's questions and concerns were addressed.
Dapsone Pregnancy And Lactation Text: This medication is Pregnancy Category C and is not considered safe during pregnancy or breast feeding.
Xolair Counseling:  Patient informed of potential adverse effects including but not limited to fever, muscle aches, rash and allergic reactions.  The patient verbalized understanding of the proper use and possible adverse effects of Xolair.  All of the patient's questions and concerns were addressed.
Rinvoq Pregnancy And Lactation Text: Based on animal studies, Rinvoq may cause embryo-fetal harm when administered to pregnant women.  The medication should not be used in pregnancy.  Breastfeeding is not recommended during treatment and for 6 days after the last dose.
Valtrex Pregnancy And Lactation Text: this medication is Pregnancy Category B and is considered safe during pregnancy. This medication is not directly found in breast milk but it's metabolite acyclovir is present.
Dutasteride Male Counseling: Dustasteride Counseling:  I discussed with the patient the risks of use of dutasteride including but not limited to decreased libido, decreased ejaculate volume, and gynecomastia. Women who can become pregnant should not handle medication.  All of the patient's questions and concerns were addressed.
Niacinamide Counseling: I recommended taking niacin or niacinamide, also know as vitamin B3, twice daily. Recent evidence suggests that taking vitamin B3 (500 mg twice daily) can reduce the risk of actinic keratoses and non-melanoma skin cancers. Side effects of vitamin B3 include flushing and headache.
Klisyri Pregnancy And Lactation Text: It is unknown if this medication can harm a developing fetus or if it is excreted in breast milk.
Quinolones Counseling:  I discussed with the patient the risks of fluoroquinolones including but not limited to GI upset, allergic reaction, drug rash, diarrhea, dizziness, photosensitivity, yeast infections, liver function test abnormalities, tendonitis/tendon rupture.
Solaraze Pregnancy And Lactation Text: This medication is Pregnancy Category B and is considered safe. There is some data to suggest avoiding during the third trimester. It is unknown if this medication is excreted in breast milk.
Picato Counseling:  I discussed with the patient the risks of Picato including but not limited to erythema, scaling, itching, weeping, crusting, and pain.
Propranolol Counseling:  I discussed with the patient the risks of propranolol including but not limited to low heart rate, low blood pressure, low blood sugar, restlessness and increased cold sensitivity. They should call the office if they experience any of these side effects.
Isotretinoin Counseling: Patient should get monthly blood tests, not donate blood, not drive at night if vision affected, not share medication, and not undergo elective surgery for 6 months after tx completed. Side effects reviewed, pt to contact office should one occur.
Ilumya Counseling: I discussed with the patient the risks of tildrakizumab including but not limited to immunosuppression, malignancy, posterior leukoencephalopathy syndrome, and serious infections.  The patient understands that monitoring is required including a PPD at baseline and must alert us or the primary physician if symptoms of infection or other concerning signs are noted.
Winlevi Counseling:  I discussed with the patient the risks of topical clascoterone including but not limited to erythema, scaling, itching, and stinging. Patient voiced their understanding.
Elidel Counseling: Patient may experience a mild burning sensation during topical application. Elidel is not approved in children less than 2 years of age. There have been case reports of hematologic and skin malignancies in patients using topical calcineurin inhibitors although causality is questionable.
Clindamycin Pregnancy And Lactation Text: This medication can be used in pregnancy if certain situations. Clindamycin is also present in breast milk.
Albendazole Counseling:  I discussed with the patient the risks of albendazole including but not limited to cytopenia, kidney damage, nausea/vomiting and severe allergy.  The patient understands that this medication is being used in an off-label manner.
Benzoyl Peroxide Pregnancy And Lactation Text: This medication is Pregnancy Category C. It is unknown if benzoyl peroxide is excreted in breast milk.
Opioid Pregnancy And Lactation Text: These medications can lead to premature delivery and should be avoided during pregnancy. These medications are also present in breast milk in small amounts.
Terbinafine Counseling: Patient counseling regarding adverse effects of terbinafine including but not limited to headache, diarrhea, rash, upset stomach, liver function test abnormalities, itching, taste/smell disturbance, nausea, abdominal pain, and flatulence.  There is a rare possibility of liver failure that can occur when taking terbinafine.  The patient understands that a baseline LFT and kidney function test may be required. The patient verbalized understanding of the proper use and possible adverse effects of terbinafine.  All of the patient's questions and concerns were addressed.
Cimzia Pregnancy And Lactation Text: This medication crosses the placenta but can be considered safe in certain situations. Cimzia may be excreted in breast milk.
Cellcept Counseling:  I discussed with the patient the risks of mycophenolate mofetil including but not limited to infection/immunosuppression, GI upset, hypokalemia, hypercholesterolemia, bone marrow suppression, lymphoproliferative disorders, malignancy, GI ulceration/bleed/perforation, colitis, interstitial lung disease, kidney failure, progressive multifocal leukoencephalopathy, and birth defects.  The patient understands that monitoring is required including a baseline creatinine and regular CBC testing. In addition, patient must alert us immediately if symptoms of infection or other concerning signs are noted.
Sotyktu Counseling:  I discussed the most common side effects of Sotyktu including: common cold, sore throat, sinus infections, cold sores, canker sores, folliculitis, and acne.  I also discussed more serious side effects of Sotyktu including but not limited to: serious allergic reactions; increased risk for infections such as TB; cancers such as lymphomas; rhabdomyolysis and elevated CPK; and elevated triglycerides and liver enzymes. 
Gabapentin Counseling: I discussed with the patient the risks of gabapentin including but not limited to dizziness, somnolence, fatigue and ataxia.
Soolantra Counseling: I discussed with the patients the risks of topial Soolantra. This is a medicine which decreases the number of mites and inflammation in the skin. You experience burning, stinging, eye irritation or allergic reactions.  Please call our office if you develop any problems from using this medication.
Niacinamide Pregnancy And Lactation Text: These medications are considered safe during pregnancy.
Dutasteride Female Counseling: Dutasteride Counseling:  I discussed with the patient the risks of use of dutasteride including but not limited to decreased libido and sexual dysfunction. Explained the teratogenic nature of the medication and stressed the importance of not getting pregnant during treatment. All of the patient's questions and concerns were addressed.
Minoxidil Counseling: Minoxidil is a topical medication which can increase blood flow where it is applied. It is uncertain how this medication increases hair growth. Side effects are uncommon and include stinging and allergic reactions.
Skyrizi Counseling: I discussed with the patient the risks of risankizumab-rzaa including but not limited to immunosuppression, and serious infections.  The patient understands that monitoring is required including a PPD at baseline and must alert us or the primary physician if symptoms of infection or other concerning signs are noted.
Xolair Pregnancy And Lactation Text: This medication is Pregnancy Category B and is considered safe during pregnancy. This medication is excreted in breast milk.
Isotretinoin Pregnancy And Lactation Text: This medication is Pregnancy Category X and is considered extremely dangerous during pregnancy. It is unknown if it is excreted in breast milk.
Griseofulvin Counseling:  I discussed with the patient the risks of griseofulvin including but not limited to photosensitivity, cytopenia, liver damage, nausea/vomiting and severe allergy.  The patient understands that this medication is best absorbed when taken with a fatty meal (e.g., ice cream or french fries).
Propranolol Pregnancy And Lactation Text: This medication is Pregnancy Category C and it isn't known if it is safe during pregnancy. It is excreted in breast milk.
Winlevi Pregnancy And Lactation Text: This medication is considered safe during pregnancy and breastfeeding.
Carac Counseling:  I discussed with the patient the risks of Carac including but not limited to erythema, scaling, itching, weeping, crusting, and pain.
Doxycycline Counseling:  Patient counseled regarding possible photosensitivity and increased risk for sunburn.  Patient instructed to avoid sunlight, if possible.  When exposed to sunlight, patients should wear protective clothing, sunglasses, and sunscreen.  The patient was instructed to call the office immediately if the following severe adverse effects occur:  hearing changes, easy bruising/bleeding, severe headache, or vision changes.  The patient verbalized understanding of the proper use and possible adverse effects of doxycycline.  All of the patient's questions and concerns were addressed.
Dutasteride Pregnancy And Lactation Text: This medication is absolutely contraindicated in women, especially during pregnancy and breast feeding. Feminization of male fetuses is possible if taking while pregnant.
Nsaids Counseling: NSAID Counseling: I discussed with the patient that NSAIDs should be taken with food. Prolonged use of NSAIDs can result in the development of stomach ulcers.  Patient advised to stop taking NSAIDs if abdominal pain occurs.  The patient verbalized understanding of the proper use and possible adverse effects of NSAIDs.  All of the patient's questions and concerns were addressed.
Cosentyx Counseling:  I discussed with the patient the risks of Cosentyx including but not limited to worsening of Crohn's disease, immunosuppression, allergic reactions and infections.  The patient understands that monitoring is required including a PPD at baseline and must alert us or the primary physician if symptoms of infection or other concerning signs are noted.
Topical Metronidazole Counseling: Metronidazole is a topical antibiotic medication. You may experience burning, stinging, redness, or allergic reactions.  Please call our office if you develop any problems from using this medication.

## 2024-02-16 NOTE — PROCEDURE: MOHS SURGERY
Melolabial Transposition Flap Text: The defect edges were debeveled with a #15 scalpel blade.  Given the location of the defect and the proximity to free margins a melolabial flap was deemed most appropriate.  Using a sterile surgical marker, an appropriate melolabial transposition flap was drawn incorporating the defect.    The area thus outlined was incised deep to adipose tissue with a #15 scalpel blade.  The skin margins were undermined to an appropriate distance in all directions utilizing iris scissors.
Show Assistants Variable: Yes
Bi-Rhombic Flap Text: The defect edges were debeveled with a #15 scalpel blade.  Given the location of the defect and the proximity to free margins a bi-rhombic flap was deemed most appropriate.  Using a sterile surgical marker, an appropriate rhombic flap was drawn incorporating the defect. The area thus outlined was incised deep to adipose tissue with a #15 scalpel blade.  The skin margins were undermined to an appropriate distance in all directions utilizing iris scissors.
Stage 2: Number Of Blocks?: 1
Asc Procedure Text (E): After obtaining clear surgical margins the patient was sent to an ASC for surgical repair.  The patient understands they will receive post-surgical care and follow-up from the ASC physician.
Estlander Flap (Upper To Lower Lip) Text: The defect of the lower lip was assessed and measured.  Given the location and size of the defect, an Estlander flap was deemed most appropriate.  Using a sterile surgical marker, an appropriate Estlander flap was measured and drawn on the upper lip. Local anesthesia was then infiltrated. A scalpel was then used to incise the lateral aspect of the flap, through skin, muscle and mucosa, leaving the flap pedicled medially.  The flap was then rotated and positioned to fill the lower lip defect.  The flap was then sutured into place with a three layer technique, closing the orbicularis oris muscle layer with subcutaneous buried sutures, followed by a mucosal layer and an epidermal layer.
Xenograft Text: The defect edges were debeveled with a #15 scalpel blade.  Given the location of the defect, shape of the defect and the proximity to free margins a xenograft was deemed most appropriate.  The graft was then trimmed to fit the size of the defect.  The graft was then placed in the primary defect and oriented appropriately.
Mid-Level Procedure Text (A): After obtaining clear surgical margins the patient was sent to a mid-level provider for surgical repair.  The patient understands they will receive post-surgical care and follow-up from the mid-level provider.
Otolaryngologist Procedure Text (B): After obtaining clear surgical margins the patient was sent to otolaryngology for surgical repair.  The patient understands they will receive post-surgical care and follow-up from the referring physician's office.
Bilateral Rotation Flap Text: The defect edges were debeveled with a #15 scalpel blade. Given the location of the defect, shape of the defect and the proximity to free margins a bilateral rotation flap was deemed most appropriate. Using a sterile surgical marker, an appropriate rotation flap was drawn incorporating the defect and placing the expected incisions within the relaxed skin tension lines where possible. The area thus outlined was incised deep to adipose tissue with a #15 scalpel blade. The skin margins were undermined to an appropriate distance in all directions utilizing iris scissors. Following this, the designed flap was carried over into the primary defect and sutured into place.
Mohs Case Number: [id number]
Ftsg Text: The defect edges were debeveled with a #15 scalpel blade.  Given the location of the defect, shape of the defect and the proximity to free margins a full thickness skin graft was deemed most appropriate.  Using a sterile surgical marker, the primary defect shape was transferred to the donor site. The area thus outlined was incised deep to adipose tissue with a #15 scalpel blade.  The harvested graft was then trimmed of adipose tissue until only dermis and epidermis was left.  The skin margins of the secondary defect were undermined to an appropriate distance in all directions utilizing iris scissors.  The secondary defect was closed with interrupted buried subcutaneous sutures.  The skin edges were then re-apposed with running  sutures.  The skin graft was then placed in the primary defect and oriented appropriately.
Bilobed Flap Text: The defect edges were debeveled with a #15 scalpel blade.  Given the location of the defect and the proximity to free margins a bilobe flap was deemed most appropriate.  Using a sterile surgical marker, an appropriate bilobe flap drawn around the defect.    The area thus outlined was incised deep to adipose tissue with a #15 scalpel blade.  The skin margins were undermined to an appropriate distance in all directions utilizing iris scissors.
Lazy S Complex Repair Preamble Text (Leave Blank If You Do Not Want): Extensive wide undermining was performed.
Bill For Surgical Tray: no
Mastoid Interpolation Flap Text: A decision was made to reconstruct the defect utilizing an interpolation axial flap and a staged reconstruction.  A telfa template was made of the defect.  This telfa template was then used to outline the mastoid interpolation flap.  The donor area for the pedicle flap was then injected with anesthesia.  The flap was excised through the skin and subcutaneous tissue down to the layer of the underlying musculature.  The pedicle flap was carefully excised within this deep plane to maintain its blood supply.  The edges of the donor site were undermined.   The donor site was closed in a primary fashion.  The pedicle was then rotated into position and sutured.  Once the tube was sutured into place, adequate blood supply was confirmed with blanching and refill.  The pedicle was then wrapped with xeroform gauze and dressed appropriately with a telfa and gauze bandage to ensure continued blood supply and protect the attached pedicle.
Cartilage Graft Text: The defect edges were debeveled with a #15 scalpel blade.  Given the location of the defect, shape of the defect, the fact the defect involved a full thickness cartilage defect a cartilage graft was deemed most appropriate.  An appropriate donor site was identified, cleansed, and anesthetized. The cartilage graft was then harvested and transferred to the recipient site, oriented appropriately and then sutured into place.  The secondary defect was then repaired using a primary closure.
Oculoplastic Surgeon Procedure Text (A): After obtaining clear surgical margins the patient was sent to oculoplastics for surgical repair.  The patient understands they will receive post-surgical care and follow-up from the referring physician's office.
Partial Purse String (Simple) Text: Given the location of the defect and the characteristics of the surrounding skin a simple purse string closure was deemed most appropriate.  Undermining was performed circumfirentially around the surgical defect.  A purse string suture was then placed and tightened. Wound tension only allowed a partial closure of the circular defect.
Stage 5: Additional Anesthesia Type: 1% lidocaine with epinephrine
Island Pedicle Flap With Canthal Suspension Text: The defect edges were debeveled with a #15 scalpel blade.  Given the location of the defect, shape of the defect and the proximity to free margins an island pedicle advancement flap was deemed most appropriate.  Using a sterile surgical marker, an appropriate advancement flap was drawn incorporating the defect, outlining the appropriate donor tissue and placing the expected incisions within the relaxed skin tension lines where possible. The area thus outlined was incised deep to adipose tissue with a #15 scalpel blade.  The skin margins were undermined to an appropriate distance in all directions around the primary defect and laterally outward around the island pedicle utilizing iris scissors.  There was minimal undermining beneath the pedicle flap. A suspension suture was placed in the canthal tendon to prevent tension and prevent ectropion.
Anesthesia Type: 1% lidocaine with epinephrine and 0.25% bupivacaine in a 2:3 ration buffered with 8.4% sodium bicarbonate
Stage 10: Number Of Blocks?: 0
Cheiloplasty (Complex) Text: A decision was made to reconstruct the defect with a  cheiloplasty.  The defect was undermined extensively.  Additional obicularis oris muscle was excised with a 15 blade scalpel.  The defect was converted into a full thickness wedge to facilite a better cosmetic result.  Small vessels were then tied off with 5-0 monocyrl. The obicularis oris, superficial fascia, adipose and dermis were then reapproximated.  After the deeper layers were approximated the epidermis was reapproximated with particular care given to realign the vermilion border.
Alar Island Pedicle Flap Text: The defect edges were debeveled with a #15 scalpel blade.  Given the location of the defect, shape of the defect and the proximity to the alar rim an island pedicle advancement flap was deemed most appropriate.  Using a sterile surgical marker, an appropriate advancement flap was drawn incorporating the defect, outlining the appropriate donor tissue and placing the expected incisions within the nasal ala running parallel to the alar rim. The area thus outlined was incised with a #15 scalpel blade.  The skin margins were undermined minimally to an appropriate distance in all directions around the primary defect and laterally outward around the island pedicle utilizing iris scissors.  There was minimal undermining beneath the pedicle flap.
Non-Graft Cartilage Fenestration Text: The cartilage was fenestrated with a 2mm punch biopsy to help facilitate healing.
Bcc Infiltrative Histology Text: There were numerous aggregates of basaloid cells demonstrating an infiltrative pattern.
Closure 2 Information: This tab is for additional flaps and grafts, including complex repair and grafts and complex repair and flaps. You can also specify a different location for the additional defect, if the location is the same you do not need to select a new one. We will insert the automated text for the repair you select below just as we do for solitary flaps and grafts. Please note that at this time if you select a location with a different insurance zone you will need to override the ICD10 and CPT if appropriate.
Special Stains Stage 5 - Results: Base On Clearance Noted Above
Mart-1 - Positive Histology Text: MART-1 staining demonstrates areas of higher density and clustering of melanocytes with Pagetoid spread upwards within the epidermis. The surgical margins are positive for tumor cells.
Additional Primary Defect Width (In Cm): 0.5
Double M-Plasty Intermediate Repair Preamble Text (Leave Blank If You Do Not Want): Undermining was performed with blunt dissection.
Plastic Surgeon Procedure Text (E): After obtaining clear surgical margins the patient was sent to plastics for surgical repair.  The patient understands they will receive post-surgical care and follow-up from the referring physician's office.
Purse String (Simple) Text: Given the location of the defect and the characteristics of the surrounding skin a purse string closure was deemed most appropriate.  Undermining was performed circumfirentially around the surgical defect.  A purse string suture was then placed and tightened.
Staging Info: By selecting yes to the question above you will include information on AJCC 8 tumor staging in your Mohs note. Information on tumor staging will be automatically added for SCCs on the head and neck. AJCC 8 includes tumor size, tumor depth, perineural involvement and bone invasion.
Consent (Ear)/Introductory Paragraph: The rationale for Mohs was explained to the patient and consent was obtained. The risks, benefits and alternatives to therapy were discussed in detail. Specifically, the risks of ear deformity, infection, scarring, bleeding, prolonged wound healing, incomplete removal, allergy to anesthesia, nerve injury and recurrence were addressed. Prior to the procedure, the treatment site was clearly identified and confirmed by the patient. All components of Universal Protocol/PAUSE Rule completed.
Graft Donor Site Dermal Sutures (Optional): 5-0 Polysorb
Staged Advancement Flap Text: The defect edges were debeveled with a #15 scalpel blade.  Given the location of the defect, shape of the defect and the proximity to free margins a staged advancement flap was deemed most appropriate.  Using a sterile surgical marker, an appropriate advancement flap was drawn incorporating the defect and placing the expected incisions within the relaxed skin tension lines where possible. The area thus outlined was incised deep to adipose tissue with a #15 scalpel blade.  The skin margins were undermined to an appropriate distance in all directions utilizing iris scissors.
Deep Sutures: 3-0 Polysorb
Suturegard Intro: Intraoperative tissue expansion was performed, utilizing the SUTUREGARD device, in order to reduce wound tension.
Island Pedicle Flap-Requiring Vessel Identification Text: The defect edges were debeveled with a #15 scalpel blade.  Given the location of the defect, shape of the defect and the proximity to free margins an island pedicle advancement flap was deemed most appropriate.  Using a sterile surgical marker, an appropriate advancement flap was drawn, based on the axial vessel mentioned above, incorporating the defect, outlining the appropriate donor tissue and placing the expected incisions within the relaxed skin tension lines where possible.    The area thus outlined was incised deep to adipose tissue with a #15 scalpel blade.  The skin margins were undermined to an appropriate distance in all directions around the primary defect and laterally outward around the island pedicle utilizing iris scissors.  There was minimal undermining beneath the pedicle flap.
Epidermal Closure: running and interrupted
Consent Type: Consent 1 (Standard)
Intermediate Repair And Graft Additional Text (Will Appearing After The Standard Complex Repair Text): The intermediate repair was not sufficient to completely close the primary defect. The remaining additional defect was repaired with the graft mentioned below.
Hemigard Postcare Instructions: The HEMIGARD strips are to remain completely dry for at least 5-7 days.
Wound Care (No Sutures): Petrolatum
Donor Site Anesthesia Type: same as repair anesthesia
Dressing (No Sutures): pressure dressing with telfa
Split-Thickness Skin Graft Text: The defect edges were debeveled with a #15 scalpel blade.  Given the location of the defect, shape of the defect and the proximity to free margins a split thickness skin graft was deemed most appropriate.  Using a sterile surgical marker, the primary defect shape was transferred to the donor site. The split thickness graft was then harvested.  The skin graft was then placed in the primary defect and oriented appropriately.
Provider Procedure Text (A): After obtaining clear surgical margins the defect was repaired by another provider.
Keystone Flap Text: The defect edges were debeveled with a #15 scalpel blade.  Given the location of the defect, shape of the defect a keystone flap was deemed most appropriate.  Using a sterile surgical marker, an appropriate keystone flap was drawn incorporating the defect, outlining the appropriate donor tissue and placing the expected incisions within the relaxed skin tension lines where possible. The area thus outlined was incised deep to adipose tissue with a #15 scalpel blade.  The skin margins were undermined to an appropriate distance in all directions around the primary defect and laterally outward around the flap utilizing iris scissors.
Include Tumor Staging In Mohs Note?: Please Select the Appropriate Response
Suturegard Retention Suture: 0-0 Nylon
Location Indication Override (Is Already Calculated Based On Selected Body Location): Area H
Cheek Interpolation Flap Text: A decision was made to reconstruct the defect utilizing an interpolation axial flap and a staged reconstruction.  A telfa template was made of the defect.  This telfa template was then used to outline the Cheek Interpolation flap.  The donor area for the pedicle flap was then injected with anesthesia.  The flap was excised through the skin and subcutaneous tissue down to the layer of the underlying musculature.  The interpolation flap was carefully excised within this deep plane to maintain its blood supply.  The edges of the donor site were undermined.   The donor site was closed in a primary fashion.  The pedicle was then rotated into position and sutured.  Once the tube was sutured into place, adequate blood supply was confirmed with blanching and refill.  The pedicle was then wrapped with xeroform gauze and dressed appropriately with a telfa and gauze bandage to ensure continued blood supply and protect the attached pedicle.
Partial Purse String (Intermediate) Text: Given the location of the defect and the characteristics of the surrounding skin an intermediate purse string closure was deemed most appropriate.  Undermining was performed circumfirentially around the surgical defect.  A purse string suture was then placed and tightened. Wound tension only allowed a partial closure of the circular defect.
Mohs Rapid Report Verbiage: The area of clinically evident tumor was marked with skin marking ink and appropriately hatched.  The initial incision was made following the Mohs approach through the skin.  The specimen was taken to the lab, divided into the necessary number of pieces, chromacoded and processed according to the Mohs protocol.  This was repeated in successive stages until a tumor free defect was achieved.
Double O-Z Flap Text: The defect edges were debeveled with a #15 scalpel blade.  Given the location of the defect, shape of the defect and the proximity to free margins a Double O-Z flap was deemed most appropriate.  Using a sterile surgical marker, an appropriate transposition flap was drawn incorporating the defect and placing the expected incisions within the relaxed skin tension lines where possible. The area thus outlined was incised deep to adipose tissue with a #15 scalpel blade.  The skin margins were undermined to an appropriate distance in all directions utilizing iris scissors.
Adjacent Tissue Transfer Text: The defect edges were debeveled with a #15 scalpel blade.  Given the location of the defect and the proximity to free margins an adjacent tissue transfer was deemed most appropriate.  Using a sterile surgical marker, an appropriate flap was drawn incorporating the defect and placing the expected incisions within the relaxed skin tension lines where possible.    The area thus outlined was incised deep to adipose tissue with a #15 scalpel blade.  The skin margins were undermined to an appropriate distance in all directions utilizing iris scissors.
Unna Boot Text: An Unna boot was placed to help immobilize the limb and facilitate more rapid healing.
Epidermal Sutures: 3-0 Prolene
Complex Repair And Graft Additional Text (Will Appearing After The Standard Complex Repair Text): The complex repair was not sufficient to completely close the primary defect. The remaining additional defect was repaired with the graft mentioned below.
O-Z Plasty Text: The defect edges were debeveled with a #15 scalpel blade.  Given the location of the defect, shape of the defect and the proximity to free margins an O-Z plasty (double transposition flap) was deemed most appropriate.  Using a sterile surgical marker, the appropriate transposition flaps were drawn incorporating the defect and placing the expected incisions within the relaxed skin tension lines where possible.    The area thus outlined was incised deep to adipose tissue with a #15 scalpel blade.  The skin margins were undermined to an appropriate distance in all directions utilizing iris scissors.  Hemostasis was achieved with electrocautery.  The flaps were then transposed into place, one clockwise and the other counterclockwise, and anchored with interrupted buried subcutaneous sutures.
Complex Repair And Flap Additional Text (Will Appearing After The Standard Complex Repair Text): The complex repair was not sufficient to completely close the primary defect. The remaining additional defect was repaired with the flap mentioned below.
Nasalis-Muscle-Based Myocutaneous Island Pedicle Flap Text: Using a #15 blade, an incision was made around the donor flap to the level of the nasalis muscle. Wide lateral undermining was then performed in both the subcutaneous plane above the nasalis muscle, and in a submuscular plane just above periosteum. This allowed the formation of a free nasalis muscle axial pedicle (based on the angular artery) which was still attached to the actual cutaneous flap, increasing its mobility and vascular viability. Hemostasis was obtained with pinpoint electrocoagulation. The flap was mobilized into position and the pivotal anchor points positioned and stabilized with buried interrupted sutures. Subcutaneous and dermal tissues were closed in a multilayered fashion with sutures. Tissue redundancies were excised, and the epidermal edges were apposed without significant tension and sutured with sutures.
Nostril Rim Text: The closure involved the nostril rim.
Consent (Nose)/Introductory Paragraph: The rationale for Mohs was explained to the patient and consent was obtained. The risks, benefits and alternatives to therapy were discussed in detail. Specifically, the risks of nasal deformity, changes in the flow of air through the nose, infection, scarring, bleeding, prolonged wound healing, incomplete removal, allergy to anesthesia, nerve injury and recurrence were addressed. Prior to the procedure, the treatment site was clearly identified and confirmed by the patient. All components of Universal Protocol/PAUSE Rule completed.
Alternatives Discussed Intro (Do Not Add Period): I discussed alternative treatments to Mohs surgery and specifically discussed the risks and benefits of
V-Y Flap Text: The defect edges were debeveled with a #15 scalpel blade.  Given the location of the defect, shape of the defect and the proximity to free margins a V-Y flap was deemed most appropriate.  Using a sterile surgical marker, an appropriate advancement flap was drawn incorporating the defect and placing the expected incisions within the relaxed skin tension lines where possible.    The area thus outlined was incised deep to adipose tissue with a #15 scalpel blade.  The skin margins were undermined to an appropriate distance in all directions utilizing iris scissors.
Rotation Flap Text: The defect edges were debeveled with a #15 scalpel blade.  Given the location of the defect, shape of the defect and the proximity to free margins a rotation flap was deemed most appropriate.  Using a sterile surgical marker, an appropriate rotation flap was drawn incorporating the defect and placing the expected incisions within the relaxed skin tension lines where possible.    The area thus outlined was incised deep to adipose tissue with a #15 scalpel blade.  The skin margins were undermined to an appropriate distance in all directions utilizing iris scissors.
Trilobed Flap Text: The defect edges were debeveled with a #15 scalpel blade.  Given the location of the defect and the proximity to free margins a trilobed flap was deemed most appropriate.  Using a sterile surgical marker, an appropriate trilobed flap drawn around the defect.    The area thus outlined was incised deep to adipose tissue with a #15 scalpel blade.  The skin margins were undermined to an appropriate distance in all directions utilizing iris scissors.
No Repair - Repaired With Adjacent Surgical Defect Text (Leave Blank If You Do Not Want): After obtaining clear surgical margins the defect was repaired concurrently with another surgical defect which was in close approximation.
Number Of Stages: 2
Orbicularis Oris Muscle Flap Text: The defect edges were debeveled with a #15 scalpel blade.  Given that the defect affected the competency of the oral sphincter an orbicularis oris muscle flap was deemed most appropriate to restore this competency and normal muscle function.  Using a sterile surgical marker, an appropriate flap was drawn incorporating the defect. The area thus outlined was incised with a #15 scalpel blade.
Suturegard Body: The suture ends were repeatedly re-tightened and re-clamped to achieve the desired tissue expansion.
Mohs Histo Method Verbiage: Each section was then chromacoded and processed in the Mohs lab using the Mohs protocol and submitted for frozen section.
Graft Donor Site Epidermal Sutures (Optional): 5-0 Surgipro
Surgeon Performing Repair (Optional): Dean Ernst MD
Area H Indication Text: Tumors in this location are included in Area H (eyelids, eyebrows, nose, lips, chin, ear, pre-auricular, post-auricular, temple, genitalia, hands, feet, ankles and areola).  Tissue conservation is critical in these anatomic locations.
Ear Wedge Repair Text: A wedge excision was completed by carrying down an excision through the full thickness of the ear and cartilage with an inward facing Burow's triangle. The wound was then closed in a layered fashion.
Area M Indication Text: Tumors in this location are included in Area M (cheek, forehead, scalp, neck, jawline and pretibial skin).  Mohs surgery is indicated for tumors in these anatomic locations.
Purse String (Intermediate) Text: Given the location of the defect and the characteristics of the surrounding skin a purse string intermediate closure was deemed most appropriate.  Undermining was performed circumfirentially around the surgical defect.  A purse string suture was then placed and tightened.
Double O-Z Plasty Text: The defect edges were debeveled with a #15 scalpel blade.  Given the location of the defect, shape of the defect and the proximity to free margins a Double O-Z plasty (double transposition flap) was deemed most appropriate.  Using a sterile surgical marker, the appropriate transposition flaps were drawn incorporating the defect and placing the expected incisions within the relaxed skin tension lines where possible. The area thus outlined was incised deep to adipose tissue with a #15 scalpel blade.  The skin margins were undermined to an appropriate distance in all directions utilizing iris scissors.  Hemostasis was achieved with electrocautery.  The flaps were then transposed into place, one clockwise and the other counterclockwise, and anchored with interrupted buried subcutaneous sutures.
Burow's Graft Text: The defect edges were debeveled with a #15 scalpel blade.  Given the location of the defect, shape of the defect, the proximity to free margins and the presence of a standing cone deformity a Burow's skin graft was deemed most appropriate. The standing cone was removed and this tissue was then trimmed to the shape of the primary defect. The adipose tissue was also removed until only dermis and epidermis were left.  The skin margins of the secondary defect were undermined to an appropriate distance in all directions utilizing iris scissors.  The secondary defect was closed with interrupted buried subcutaneous sutures.  The skin edges were then re-apposed with running  sutures.  The skin graft was then placed in the primary defect and oriented appropriately.
H Plasty Text: Given the location of the defect, shape of the defect and the proximity to free margins a H-plasty was deemed most appropriate for repair.  Using a sterile surgical marker, the appropriate advancement arms of the H-plasty were drawn incorporating the defect and placing the expected incisions within the relaxed skin tension lines where possible. The area thus outlined was incised deep to adipose tissue with a #15 scalpel blade. The skin margins were undermined to an appropriate distance in all directions utilizing iris scissors.  The opposing advancement arms were then advanced into place in opposite direction and anchored with interrupted buried subcutaneous sutures.
Tarsorrhaphy Text: A tarsorrhaphy was performed using Frost sutures.
Previous Accession (Optional): [id number]
Debridement Text: The wound edges were debrided prior to proceeding with the closure to facilitate wound healing.
Closure 4 Information: This tab is for additional flaps and grafts above and beyond our usual structured repairs.  Please note if you enter information here it will not currently bill and you will need to add the billing information manually.
Area L Indication Text: Tumors in this location are included in Area L (trunk and extremities).  Mohs surgery is indicated for larger tumors, or tumors with aggressive histologic features, in these anatomic locations.
Double Z Plasty Text: The lesion was extirpated to the level of the fat with a #15 scalpel blade. Given the location of the defect, shape of the defect and the proximity to free margins a double Z-plasty was deemed most appropriate for repair. Using a sterile surgical marker, the appropriate transposition arms of the double Z-plasty were drawn incorporating the defect and placing the expected incisions within the relaxed skin tension lines where possible. The area thus outlined was incised deep to adipose tissue with a #15 scalpel blade. The skin margins were undermined to an appropriate distance in all directions utilizing iris scissors. The opposing transposition arms were then transposed and carried over into place in opposite direction and anchored with interrupted buried subcutaneous sutures.
Repair Hemostasis (Optional): Pinpoint electrocautery
Modified Advancement Flap Text: The defect edges were debeveled with a #15 scalpel blade.  Given the location of the defect, shape of the defect and the proximity to free margins a modified advancement flap was deemed most appropriate.  Using a sterile surgical marker, an appropriate advancement flap was drawn incorporating the defect and placing the expected incisions within the relaxed skin tension lines where possible.    The area thus outlined was incised deep to adipose tissue with a #15 scalpel blade.  The skin margins were undermined to an appropriate distance in all directions utilizing iris scissors.
Consent 2/Introductory Paragraph: Mohs surgery was explained to the patient and consent was obtained. The risks, benefits and alternatives to therapy were discussed in detail. Specifically, the risks of infection, scarring, bleeding, prolonged wound healing, incomplete removal, allergy to anesthesia, nerve injury and recurrence were addressed. Prior to the procedure, the treatment site was clearly identified and confirmed by the patient. All components of Universal Protocol/PAUSE Rule completed.
Wound Care: Vaseline
Full Thickness Lip Wedge Repair (Flap) Text: Given the location of the defect and the proximity to free margins a full thickness wedge repair was deemed most appropriate.  Using a sterile surgical marker, the appropriate repair was drawn incorporating the defect and placing the expected incisions perpendicular to the vermilion border.  The vermilion border was also meticulously outlined to ensure appropriate reapproximation during the repair.  The area thus outlined was incised through and through with a #15 scalpel blade.  The muscularis and dermis were reaproximated with deep sutures following hemostasis. Care was taken to realign the vermilion border before proceeding with the superficial closure.  Once the vermilion was realigned the superfical and mucosal closure was finished.
Burow's Advancement Flap Text: The defect edges were debeveled with a #15 scalpel blade.  Given the location of the defect and the proximity to free margins a Burow's advancement flap was deemed most appropriate.  Using a sterile surgical marker, the appropriate advancement flap was drawn incorporating the defect and placing the expected incisions within the relaxed skin tension lines where possible.    The area thus outlined was incised deep to adipose tissue with a #15 scalpel blade.  The skin margins were undermined to an appropriate distance in all directions utilizing iris scissors.
Dermal Autograft Text: The defect edges were debeveled with a #15 scalpel blade.  Given the location of the defect, shape of the defect and the proximity to free margins a dermal autograft was deemed most appropriate.  Using a sterile surgical marker, the primary defect shape was transferred to the donor site. The area thus outlined was incised deep to adipose tissue with a #15 scalpel blade.  The harvested graft was then trimmed of adipose and epidermal tissue until only dermis was left.  The skin graft was then placed in the primary defect and oriented appropriately.
Ear Star Wedge Flap Text: The defect edges were debeveled with a #15 blade scalpel.  Given the location of the defect and the proximity to free margins (helical rim) an ear star wedge flap was deemed most appropriate.  Using a sterile surgical marker, the appropriate flap was drawn incorporating the defect and placing the expected incisions between the helical rim and antihelix where possible.  The area thus outlined was incised through and through with a #15 scalpel blade.
Cheiloplasty (Less Than 50%) Text: A decision was made to reconstruct the defect with a  cheiloplasty.  The defect was undermined extensively.  Additional obicularis oris muscle was excised with a 15 blade scalpel.  The defect was converted into a full thickness wedge, of less than 50% of the vertical height of the lip, to facilite a better cosmetic result.  Small vessels were then tied off with 5-0 monocyrl. The obicularis oris, superficial fascia, adipose and dermis were then reapproximated.  After the deeper layers were approximated the epidermis was reapproximated with particular care given to realign the vermilion border.
Advancement Flap (Single) Text: The defect edges were debeveled with a #15 scalpel blade.  Given the location of the defect and the proximity to free margins a single advancement flap was deemed most appropriate.  Using a sterile surgical marker, an appropriate advancement flap was drawn incorporating the defect and placing the expected incisions within the relaxed skin tension lines where possible.    The area thus outlined was incised deep to adipose tissue with a #15 scalpel blade.  The skin margins were undermined to an appropriate distance in all directions utilizing iris scissors.
Posterior Auricular Interpolation Flap Text: A decision was made to reconstruct the defect utilizing an interpolation axial flap and a staged reconstruction.  A telfa template was made of the defect.  This telfa template was then used to outline the posterior auricular interpolation flap.  The donor area for the pedicle flap was then injected with anesthesia.  The flap was excised through the skin and subcutaneous tissue down to the layer of the underlying musculature.  The pedicle flap was carefully excised within this deep plane to maintain its blood supply.  The edges of the donor site were undermined.   The donor site was closed in a primary fashion.  The pedicle was then rotated into position and sutured.  Once the tube was sutured into place, adequate blood supply was confirmed with blanching and refill.  The pedicle was then wrapped with xeroform gauze and dressed appropriately with a telfa and gauze bandage to ensure continued blood supply and protect the attached pedicle.
Surgical Defect Length In Cm (Optional): 2.8
Z Plasty Text: The lesion was extirpated to the level of the fat with a #15 scalpel blade.  Given the location of the defect, shape of the defect and the proximity to free margins a Z-plasty was deemed most appropriate for repair.  Using a sterile surgical marker, the appropriate transposition arms of the Z-plasty were drawn incorporating the defect and placing the expected incisions within the relaxed skin tension lines where possible.    The area thus outlined was incised deep to adipose tissue with a #15 scalpel blade.  The skin margins were undermined to an appropriate distance in all directions utilizing iris scissors.  The opposing transposition arms were then transposed into place in opposite direction and anchored with interrupted buried subcutaneous sutures.
Retention Suture Bite Size: 1 mm
Abbe Flap (Upper To Lower Lip) Text: The defect of the lower lip was assessed and measured.  Given the location and size of the defect, an Abbe flap was deemed most appropriate.  Using a sterile surgical marker, an appropriate Abbe flap was measured and drawn on the upper lip. Local anesthesia was then infiltrated.  A scalpel was then used to incise the upper lip through and through the skin, vermilion, muscle and mucosa, leaving the flap pedicled on the opposite side.  The flap was then rotated and transferred to the lower lip defect.  The flap was then sutured into place with a three layer technique, closing the orbicularis oris muscle layer with subcutaneous buried sutures, followed by a mucosal layer and an epidermal layer.
Pain Refusal Text: I offered to prescribe pain medication but the patient refused to take this medication.
O-T Plasty Text: The defect edges were debeveled with a #15 scalpel blade.  Given the location of the defect, shape of the defect and the proximity to free margins an O-T plasty was deemed most appropriate.  Using a sterile surgical marker, an appropriate O-T plasty was drawn incorporating the defect and placing the expected incisions within the relaxed skin tension lines where possible.    The area thus outlined was incised deep to adipose tissue with a #15 scalpel blade.  The skin margins were undermined to an appropriate distance in all directions utilizing iris scissors.
Interpolation Flap Text: A decision was made to reconstruct the defect utilizing an interpolation axial flap and a staged reconstruction.  A telfa template was made of the defect.  This telfa template was then used to outline the interpolation flap.  The donor area for the pedicle flap was then injected with anesthesia.  The flap was excised through the skin and subcutaneous tissue down to the layer of the underlying musculature.  The interpolation flap was carefully excised within this deep plane to maintain its blood supply.  The edges of the donor site were undermined.   The donor site was closed in a primary fashion.  The pedicle was then rotated into position and sutured.  Once the tube was sutured into place, adequate blood supply was confirmed with blanching and refill.  The pedicle was then wrapped with xeroform gauze and dressed appropriately with a telfa and gauze bandage to ensure continued blood supply and protect the attached pedicle.
Undermining Location (Optional): in the superficial subcutaneous fat
Muscle Hinge Flap Text: The defect edges were debeveled with a #15 scalpel blade.  Given the size, depth and location of the defect and the proximity to free margins a muscle hinge flap was deemed most appropriate.  Using a sterile surgical marker, an appropriate hinge flap was drawn incorporating the defect. The area thus outlined was incised with a #15 scalpel blade.  The skin margins were undermined to an appropriate distance in all directions utilizing iris scissors.
Manual Repair Warning Statement: We plan on removing the manually selected variable below in favor of our much easier automatic structured text blocks found in the previous tab. We decided to do this to help make the flow better and give you the full power of structured data. Manual selection is never going to be ideal in our platform and I would encourage you to avoid using manual selection from this point on, especially since I will be sunsetting this feature. It is important that you do one of two things with the customized text below. First, you can save all of the text in a word file so you can have it for future reference. Second, transfer the text to the appropriate area in the Library tab. Lastly, if there is a flap or graft type which we do not have you need to let us know right away so I can add it in before the variable is hidden. No need to panic, we plan to give you roughly 6 months to make the change.
O-Z Flap Text: The defect edges were debeveled with a #15 scalpel blade.  Given the location of the defect, shape of the defect and the proximity to free margins an O-Z flap was deemed most appropriate.  Using a sterile surgical marker, an appropriate transposition flap was drawn incorporating the defect and placing the expected incisions within the relaxed skin tension lines where possible. The area thus outlined was incised deep to adipose tissue with a #15 scalpel blade.  The skin margins were undermined to an appropriate distance in all directions utilizing iris scissors.
Consent 3/Introductory Paragraph: I gave the patient a chance to ask questions they had about the procedure.  Following this I explained the Mohs procedure and consent was obtained. The risks, benefits and alternatives to therapy were discussed in detail. Specifically, the risks of infection, scarring, bleeding, prolonged wound healing, incomplete removal, allergy to anesthesia, nerve injury and recurrence were addressed. Prior to the procedure, the treatment site was clearly identified and confirmed by the patient. All components of Universal Protocol/PAUSE Rule completed.
Brow Lift Text: A midfrontal incision was made medially to the defect to allow access to the tissues just superior to the left eyebrow. Following careful dissection inferiorly in a supraperiosteal plane to the level of the left eyebrow, several 3-0 monocryl sutures were used to resuspend the eyebrow orbicularis oculi muscular unit to the superior frontal bone periosteum. This resulted in an appropriate reapproximation of static eyebrow symmetry and correction of the left brow ptosis.
Transposition Flap Text: The defect edges were debeveled with a #15 scalpel blade.  Given the location of the defect and the proximity to free margins a transposition flap was deemed most appropriate.  Using a sterile surgical marker, an appropriate transposition flap was drawn incorporating the defect.    The area thus outlined was incised deep to adipose tissue with a #15 scalpel blade.  The skin margins were undermined to an appropriate distance in all directions utilizing iris scissors.
Consent (Temporal Branch)/Introductory Paragraph: The rationale for Mohs was explained to the patient and consent was obtained. The risks, benefits and alternatives to therapy were discussed in detail. Specifically, the risks of damage to the temporal branch of the facial nerve, infection, scarring, bleeding, prolonged wound healing, incomplete removal, allergy to anesthesia, and recurrence were addressed. Prior to the procedure, the treatment site was clearly identified and confirmed by the patient. All components of Universal Protocol/PAUSE Rule completed.
Paramedian Forehead Flap Text: A decision was made to reconstruct the defect utilizing an interpolation axial flap and a staged reconstruction.  A telfa template was made of the defect.  This telfa template was then used to outline the paramedian forehead pedicle flap.  The donor area for the pedicle flap was then injected with anesthesia.  The flap was excised through the skin and subcutaneous tissue down to the layer of the underlying musculature.  The pedicle flap was carefully excised within this deep plane to maintain its blood supply.  The edges of the donor site were undermined.   The donor site was closed in a primary fashion.  The pedicle was then rotated into position and sutured.  Once the tube was sutured into place, adequate blood supply was confirmed with blanching and refill.  The pedicle was then wrapped with xeroform gauze and dressed appropriately with a telfa and gauze bandage to ensure continued blood supply and protect the attached pedicle.
Cheek-To-Nose Interpolation Flap Text: A decision was made to reconstruct the defect utilizing an interpolation axial flap and a staged reconstruction.  A telfa template was made of the defect.  This telfa template was then used to outline the Cheek-To-Nose Interpolation flap.  The donor area for the pedicle flap was then injected with anesthesia.  The flap was excised through the skin and subcutaneous tissue down to the layer of the underlying musculature.  The interpolation flap was carefully excised within this deep plane to maintain its blood supply.  The edges of the donor site were undermined.   The donor site was closed in a primary fashion.  The pedicle was then rotated into position and sutured.  Once the tube was sutured into place, adequate blood supply was confirmed with blanching and refill.  The pedicle was then wrapped with xeroform gauze and dressed appropriately with a telfa and gauze bandage to ensure continued blood supply and protect the attached pedicle.
Simple / Intermediate / Complex Repair - Final Wound Length In Cm: 5
Consent (Marginal Mandibular)/Introductory Paragraph: The rationale for Mohs was explained to the patient and consent was obtained. The risks, benefits and alternatives to therapy were discussed in detail. Specifically, the risks of damage to the marginal mandibular branch of the facial nerve, infection, scarring, bleeding, prolonged wound healing, incomplete removal, allergy to anesthesia, and recurrence were addressed. Prior to the procedure, the treatment site was clearly identified and confirmed by the patient. All components of Universal Protocol/PAUSE Rule completed.
Banner Transposition Flap Text: The defect edges were debeveled with a #15 scalpel blade.  Given the location of the defect and the proximity to free margins a Banner transposition flap was deemed most appropriate.  Using a sterile surgical marker, an appropriate flap drawn around the defect. The area thus outlined was incised deep to adipose tissue with a #15 scalpel blade.  The skin margins were undermined to an appropriate distance in all directions utilizing iris scissors.
Width Of Defect Perpendicular To Closure In Cm (Required): 1.7
V-Y Plasty Text: The defect edges were debeveled with a #15 scalpel blade.  Given the location of the defect, shape of the defect and the proximity to free margins an V-Y advancement flap was deemed most appropriate.  Using a sterile surgical marker, an appropriate advancement flap was drawn incorporating the defect and placing the expected incisions within the relaxed skin tension lines where possible.    The area thus outlined was incised deep to adipose tissue with a #15 scalpel blade.  The skin margins were undermined to an appropriate distance in all directions utilizing iris scissors.
Medical Necessity Statement: Based on my medical judgement, Mohs surgery is the most appropriate treatment for this cancer compared to other treatments.
Composite Graft Text: The defect edges were debeveled with a #15 scalpel blade.  Given the location of the defect, shape of the defect, the proximity to free margins and the fact the defect was full thickness a composite graft was deemed most appropriate.  The defect was outline and then transferred to the donor site.  A full thickness graft was then excised from the donor site. The graft was then placed in the primary defect, oriented appropriately and then sutured into place.  The secondary defect was then repaired using a primary closure.
Graft Cartilage Fenestration Text: The cartilage was fenestrated with a 2mm punch biopsy to help facilitate graft survival and healing.
Repair Anesthesia Method: local infiltration
Peng Advancement Flap Text: The defect edges were debeveled with a #15 scalpel blade.  Given the location of the defect, shape of the defect and the proximity to free margins a Peng advancement flap was deemed most appropriate.  Using a sterile surgical marker, an appropriate advancement flap was drawn incorporating the defect and placing the expected incisions within the relaxed skin tension lines where possible. The area thus outlined was incised deep to adipose tissue with a #15 scalpel blade.  The skin margins were undermined to an appropriate distance in all directions utilizing iris scissors.
Mauc Instructions: By selecting yes to the question below the MAUC number will be added into the note.  This will be calculated automatically based on the diagnosis chosen, the size entered, the body zone selected (H,M,L) and the specific indications you chose. You will also have the option to override the Mohs AUC if you disagree with the automatically calculated number and this option is found in the Case Summary tab.
Star Wedge Flap Text: The defect edges were debeveled with a #15 scalpel blade.  Given the location of the defect, shape of the defect and the proximity to free margins a star wedge flap was deemed most appropriate.  Using a sterile surgical marker, an appropriate rotation flap was drawn incorporating the defect and placing the expected incisions within the relaxed skin tension lines where possible. The area thus outlined was incised deep to adipose tissue with a #15 scalpel blade.  The skin margins were undermined to an appropriate distance in all directions utilizing iris scissors.
Island Pedicle Flap Text: The defect edges were debeveled with a #15 scalpel blade.  Given the location of the defect, shape of the defect and the proximity to free margins an island pedicle advancement flap was deemed most appropriate.  Using a sterile surgical marker, an appropriate advancement flap was drawn incorporating the defect, outlining the appropriate donor tissue and placing the expected incisions within the relaxed skin tension lines where possible.    The area thus outlined was incised deep to adipose tissue with a #15 scalpel blade.  The skin margins were undermined to an appropriate distance in all directions around the primary defect and laterally outward around the island pedicle utilizing iris scissors.  There was minimal undermining beneath the pedicle flap.
Hemigard Intro: Due to skin fragility and wound tension, it was decided to use HEMIGARD adhesive retention suture devices to permit a linear closure. The skin was cleaned and dried for a 6cm distance away from the wound. Excessive hair, if present, was removed to allow for adhesion.
Epidermal Autograft Text: The defect edges were debeveled with a #15 scalpel blade.  Given the location of the defect, shape of the defect and the proximity to free margins an epidermal autograft was deemed most appropriate.  Using a sterile surgical marker, the primary defect shape was transferred to the donor site. The epidermal graft was then harvested.  The skin graft was then placed in the primary defect and oriented appropriately.
Suture Removal: 7 days
Repair Type: Complex Repair and Graft
Rhombic Flap Text: The defect edges were debeveled with a #15 scalpel blade.  Given the location of the defect and the proximity to free margins a rhombic flap was deemed most appropriate.  Using a sterile surgical marker, an appropriate rhombic flap was drawn incorporating the defect.    The area thus outlined was incised deep to adipose tissue with a #15 scalpel blade.  The skin margins were undermined to an appropriate distance in all directions utilizing iris scissors.
Estimated Blood Loss (Cc): minimal
Consent (Lip)/Introductory Paragraph: The rationale for Mohs was explained to the patient and consent was obtained. The risks, benefits and alternatives to therapy were discussed in detail. Specifically, the risks of lip deformity, changes in the oral aperture, infection, scarring, bleeding, prolonged wound healing, incomplete removal, allergy to anesthesia, nerve injury and recurrence were addressed. Prior to the procedure, the treatment site was clearly identified and confirmed by the patient. All components of Universal Protocol/PAUSE Rule completed.
Vermilion Border Text: The closure involved the vermilion border.
Pinch Graft Text: The defect edges were debeveled with a #15 scalpel blade. Given the location of the defect, shape of the defect and the proximity to free margins a pinch graft was deemed most appropriate. Using a sterile surgical marker, the primary defect shape was transferred to the donor site. The area thus outlined was incised deep to adipose tissue with a #15 scalpel blade.  The harvested graft was then trimmed of adipose tissue until only dermis and epidermis was left. The skin margins of the secondary defect were undermined to an appropriate distance in all directions utilizing iris scissors.  The secondary defect was closed with interrupted buried subcutaneous sutures.  The skin edges were then re-apposed with running  sutures.  The skin graft was then placed in the primary defect and oriented appropriately.
Advancement Flap (Double) Text: The defect edges were debeveled with a #15 scalpel blade.  Given the location of the defect and the proximity to free margins a double advancement flap was deemed most appropriate.  Using a sterile surgical marker, the appropriate advancement flaps were drawn incorporating the defect and placing the expected incisions within the relaxed skin tension lines where possible.    The area thus outlined was incised deep to adipose tissue with a #15 scalpel blade.  The skin margins were undermined to an appropriate distance in all directions utilizing iris scissors.
Where Do You Want The Question To Include Opioid Counseling Located?: Case Summary Tab
Spiral Flap Text: The defect edges were debeveled with a #15 scalpel blade.  Given the location of the defect, shape of the defect and the proximity to free margins a spiral flap was deemed most appropriate.  Using a sterile surgical marker, an appropriate rotation flap was drawn incorporating the defect and placing the expected incisions within the relaxed skin tension lines where possible. The area thus outlined was incised deep to adipose tissue with a #15 scalpel blade.  The skin margins were undermined to an appropriate distance in all directions utilizing iris scissors.
Referring Physician (Optional): Rod Bahena MD
Crescentic Advancement Flap Text: The defect edges were debeveled with a #15 scalpel blade.  Given the location of the defect and the proximity to free margins a crescentic advancement flap was deemed most appropriate.  Using a sterile surgical marker, the appropriate advancement flap was drawn incorporating the defect and placing the expected incisions within the relaxed skin tension lines where possible.    The area thus outlined was incised deep to adipose tissue with a #15 scalpel blade.  The skin margins were undermined to an appropriate distance in all directions utilizing iris scissors.
Inflammation Suggestive Of Cancer Camouflage Histology Text: There was a dense lymphocytic infiltrate which prevented adequate histologic evaluation of adjacent structures.
Helical Rim Text: The closure involved the helical rim.
No Residual Tumor Seen Histology Text: There were no malignant cells seen in the sections examined.
Tissue Cultured Epidermal Autograft Text: The defect edges were debeveled with a #15 scalpel blade.  Given the location of the defect, shape of the defect and the proximity to free margins a tissue cultured epidermal autograft was deemed most appropriate.  The graft was then trimmed to fit the size of the defect.  The graft was then placed in the primary defect and oriented appropriately.
Epidermal Closure Graft Donor Site (Optional): running
Nasal Turnover Hinge Flap Text: The defect edges were debeveled with a #15 scalpel blade.  Given the size, depth, location of the defect and the defect being full thickness a nasal turnover hinge flap was deemed most appropriate.  Using a sterile surgical marker, an appropriate hinge flap was drawn incorporating the defect. The area thus outlined was incised with a #15 scalpel blade. The flap was designed to recreate the nasal mucosal lining and the alar rim. The skin margins were undermined to an appropriate distance in all directions utilizing iris scissors.
Abbe Flap (Lower To Upper Lip) Text: The defect of the upper lip was assessed and measured.  Given the location and size of the defect, an Abbe flap was deemed most appropriate.  Using a sterile surgical marker, an appropriate Abbe flap was measured and drawn on the lower lip. Local anesthesia was then infiltrated. A scalpel was then used to incise the upper lip through and through the skin, vermilion, muscle and mucosa, leaving the flap pedicled on the opposite side.  The flap was then rotated and transferred to the lower lip defect.  The flap was then sutured into place with a three layer technique, closing the orbicularis oris muscle layer with subcutaneous buried sutures, followed by a mucosal layer and an epidermal layer.
O-T Advancement Flap Text: The defect edges were debeveled with a #15 scalpel blade.  Given the location of the defect, shape of the defect and the proximity to free margins an O-T advancement flap was deemed most appropriate.  Using a sterile surgical marker, an appropriate advancement flap was drawn incorporating the defect and placing the expected incisions within the relaxed skin tension lines where possible.    The area thus outlined was incised deep to adipose tissue with a #15 scalpel blade.  The skin margins were undermined to an appropriate distance in all directions utilizing iris scissors.
Bilateral Helical Rim Advancement Flap Text: The defect edges were debeveled with a #15 blade scalpel.  Given the location of the defect and the proximity to free margins (helical rim) a bilateral helical rim advancement flap was deemed most appropriate.  Using a sterile surgical marker, the appropriate advancement flaps were drawn incorporating the defect and placing the expected incisions between the helical rim and antihelix where possible.  The area thus outlined was incised through and through with a #15 scalpel blade.  With a skin hook and iris scissors, the flaps were gently and sharply undermined and freed up.
Same Histology In Subsequent Stages Text: The pattern and morphology of the tumor is as described in the first stage.
W Plasty Text: The lesion was extirpated to the level of the fat with a #15 scalpel blade.  Given the location of the defect, shape of the defect and the proximity to free margins a W-plasty was deemed most appropriate for repair.  Using a sterile surgical marker, the appropriate transposition arms of the W-plasty were drawn incorporating the defect and placing the expected incisions within the relaxed skin tension lines where possible.    The area thus outlined was incised deep to adipose tissue with a #15 scalpel blade.  The skin margins were undermined to an appropriate distance in all directions utilizing iris scissors.  The opposing transposition arms were then transposed into place in opposite direction and anchored with interrupted buried subcutaneous sutures.
Skin Substitute Text: The defect edges were debeveled with a #15 scalpel blade.  Given the location of the defect, shape of the defect and the proximity to free margins a skin substitute graft was deemed most appropriate.  The graft material was trimmed to fit the size of the defect. The graft was then placed in the primary defect and oriented appropriately.
Bilobed Transposition Flap Text: The defect edges were debeveled with a #15 scalpel blade.  Given the location of the defect and the proximity to free margins a bilobed transposition flap was deemed most appropriate.  Using a sterile surgical marker, an appropriate bilobe flap drawn around the defect.    The area thus outlined was incised deep to adipose tissue with a #15 scalpel blade.  The skin margins were undermined to an appropriate distance in all directions utilizing iris scissors.
Retention Suture Text: Retention sutures were placed to support the closure and prevent dehiscence.
Consent 1/Introductory Paragraph: The rationale for Mohs was explained to the patient and consent was obtained. The risks, benefits and alternatives to therapy were discussed in detail. Specifically, the risks of infection, scarring, bleeding, prolonged wound healing, incomplete removal, allergy to anesthesia, nerve injury and recurrence were addressed. Prior to the procedure, the treatment site was clearly identified and confirmed by the patient. All components of Universal Protocol/PAUSE Rule completed.
Subsequent Stages Histo Method Verbiage: Using a similar technique to that described above, a thin layer of tissue was removed from all areas where tumor was visible on the previous stage.  The tissue was again oriented, mapped, dyed, and processed as above.
Helical Rim Advancement Flap Text: The defect edges were debeveled with a #15 blade scalpel.  Given the location of the defect and the proximity to free margins (helical rim) a double helical rim advancement flap was deemed most appropriate.  Using a sterile surgical marker, the appropriate advancement flaps were drawn incorporating the defect and placing the expected incisions between the helical rim and antihelix where possible.  The area thus outlined was incised through and through with a #15 scalpel blade.  With a skin hook and iris scissors, the flaps were gently and sharply undermined and freed up.
Rhomboid Transposition Flap Text: The defect edges were debeveled with a #15 scalpel blade.  Given the location of the defect and the proximity to free margins a rhomboid transposition flap was deemed most appropriate.  Using a sterile surgical marker, an appropriate rhomboid flap was drawn incorporating the defect.    The area thus outlined was incised deep to adipose tissue with a #15 scalpel blade.  The skin margins were undermined to an appropriate distance in all directions utilizing iris scissors.
Surgeon/Pathologist Verbiage (Will Incorporate Name Of Surgeon From Intro If Not Blank): operated in two distinct and integrated capacities as the surgeon and pathologist.
Chonodrocutaneous Helical Advancement Flap Text: The defect edges were debeveled with a #15 scalpel blade.  Given the location of the defect and the proximity to free margins a chondrocutaneous helical advancement flap was deemed most appropriate.  Using a sterile surgical marker, the appropriate advancement flap was drawn incorporating the defect and placing the expected incisions within the relaxed skin tension lines where possible.    The area thus outlined was incised deep to adipose tissue with a #15 scalpel blade.  The skin margins were undermined to an appropriate distance in all directions utilizing iris scissors.
Secondary Intention Text (Leave Blank If You Do Not Want): The defect will heal with secondary intention.
Double Island Pedicle Flap Text: The defect edges were debeveled with a #15 scalpel blade.  Given the location of the defect, shape of the defect and the proximity to free margins a double island pedicle advancement flap was deemed most appropriate.  Using a sterile surgical marker, an appropriate advancement flap was drawn incorporating the defect, outlining the appropriate donor tissue and placing the expected incisions within the relaxed skin tension lines where possible.    The area thus outlined was incised deep to adipose tissue with a #15 scalpel blade.  The skin margins were undermined to an appropriate distance in all directions around the primary defect and laterally outward around the island pedicle utilizing iris scissors.  There was minimal undermining beneath the pedicle flap.
Tumor Depth: Less than 6mm from granular layer and no invasion beyond the subcutaneous fat
O-L Flap Text: The defect edges were debeveled with a #15 scalpel blade.  Given the location of the defect, shape of the defect and the proximity to free margins an O-L flap was deemed most appropriate.  Using a sterile surgical marker, an appropriate advancement flap was drawn incorporating the defect and placing the expected incisions within the relaxed skin tension lines where possible.    The area thus outlined was incised deep to adipose tissue with a #15 scalpel blade.  The skin margins were undermined to an appropriate distance in all directions utilizing iris scissors.
Eye Protection Verbiage: Before proceeding with the stage, a plastic scleral shield was inserted. The globe was anesthetized with a few drops of 1% lidocaine with 1:100,000 epinephrine. Then, an appropriate sized scleral shield was chosen and coated with lacrilube ointment. The shield was gently inserted and left in place for the duration of each stage. After the stage was completed, the shield was gently removed.
Localized Dermabrasion With Wire Brush Text: The patient was draped in routine manner.  Localized dermabrasion using 3 x 17 mm wire brush was performed in routine manner to papillary dermis. This spot dermabrasion is being performed to complete skin cancer reconstruction. It also will eliminate the other sun damaged precancerous cells that are known to be part of the regional effect of a lifetime's worth of sun exposure. This localized dermabrasion is therapeutic and should not be considered cosmetic in any regard.
Additional Epidermal Sutures: 5-0 Fast Absorbing Gut
Additional Graft Type: Burow's Graft
Post-Care Instructions: I reviewed with the patient in detail post-care instructions. Patient is not to engage in any heavy lifting, exercise, or swimming for the next 14 days. Should the patient develop any fevers, chills, bleeding, severe pain patient will contact the office immediately.
Graft Donor Site Bandage (Optional-Leave Blank If You Don't Want In Note): Aquaplast was fitted to the graft site and sewn into place. A pressure bandage were applied to the donor site and over the aquaplast bolster.
Mohs Method Verbiage: An incision at a 45 degree angle following the standard Mohs approach was done and the specimen was harvested as a microscopic controlled layer.
Consent (Scalp)/Introductory Paragraph: The rationale for Mohs was explained to the patient and consent was obtained. The risks, benefits and alternatives to therapy were discussed in detail. Specifically, the risks of changes in hair growth pattern secondary to repair, infection, scarring, bleeding, prolonged wound healing, incomplete removal, allergy to anesthesia, nerve injury and recurrence were addressed. Prior to the procedure, the treatment site was clearly identified and confirmed by the patient. All components of Universal Protocol/PAUSE Rule completed.
Postop Diagnosis: same
Mucosal Advancement Flap Text: Given the location of the defect, shape of the defect and the proximity to free margins a mucosal advancement flap was deemed most appropriate. Incisions were made with a 15 blade scalpel in the appropriate fashion along the cutaneous vermilion border and the mucosal lip. The remaining actinically damaged mucosal tissue was excised.  The mucosal advancement flap was then elevated to the gingival sulcus with care taken to preserve the neurovascular structures and advanced into the primary defect. Care was taken to ensure that precise realignment of the vermilion border was achieved.
Distance Of Undermining In Cm (Required): 1.8
Hatchet Flap Text: The defect edges were debeveled with a #15 scalpel blade.  Given the location of the defect, shape of the defect and the proximity to free margins a hatchet flap was deemed most appropriate.  Using a sterile surgical marker, an appropriate hatchet flap was drawn incorporating the defect and placing the expected incisions within the relaxed skin tension lines where possible.    The area thus outlined was incised deep to adipose tissue with a #15 scalpel blade.  The skin margins were undermined to an appropriate distance in all directions utilizing iris scissors.
Zygomaticofacial Flap Text: Given the location of the defect, shape of the defect and the proximity to free margins a zygomaticofacial flap was deemed most appropriate for repair.  Using a sterile surgical marker, the appropriate flap was drawn incorporating the defect and placing the expected incisions within the relaxed skin tension lines where possible. The area thus outlined was incised deep to adipose tissue with a #15 scalpel blade with preservation of a vascular pedicle.  The skin margins were undermined to an appropriate distance in all directions utilizing iris scissors.  The flap was then placed into the defect and anchored with interrupted buried subcutaneous sutures.
A-T Advancement Flap Text: The defect edges were debeveled with a #15 scalpel blade.  Given the location of the defect, shape of the defect and the proximity to free margins an A-T advancement flap was deemed most appropriate.  Using a sterile surgical marker, an appropriate advancement flap was drawn incorporating the defect and placing the expected incisions within the relaxed skin tension lines where possible.    The area thus outlined was incised deep to adipose tissue with a #15 scalpel blade.  The skin margins were undermined to an appropriate distance in all directions utilizing iris scissors.
Consent (Near Eyelid Margin)/Introductory Paragraph: The rationale for Mohs was explained to the patient and consent was obtained. The risks, benefits and alternatives to therapy were discussed in detail. Specifically, the risks of ectropion or eyelid deformity, infection, scarring, bleeding, prolonged wound healing, incomplete removal, allergy to anesthesia, nerve injury and recurrence were addressed. Prior to the procedure, the treatment site was clearly identified and confirmed by the patient. All components of Universal Protocol/PAUSE Rule completed.
Home Suture Removal Text: Patient was provided instructions on removing sutures and will remove their sutures at home.  If they have any questions or difficulties they will call the office.
Advancement-Rotation Flap Text: The defect edges were debeveled with a #15 scalpel blade.  Given the location of the defect, shape of the defect and the proximity to free margins an advancement-rotation flap was deemed most appropriate.  Using a sterile surgical marker, an appropriate flap was drawn incorporating the defect and placing the expected incisions within the relaxed skin tension lines where possible. The area thus outlined was incised deep to adipose tissue with a #15 scalpel blade.  The skin margins were undermined to an appropriate distance in all directions utilizing iris scissors.
Bcc Histology Text: There were numerous aggregates of basaloid cells.
Information: Selecting Yes will display possible errors in your note based on the variables you have selected. This validation is only offered as a suggestion for you. PLEASE NOTE THAT THE VALIDATION TEXT WILL BE REMOVED WHEN YOU FINALIZE YOUR NOTE. IF YOU WANT TO FAX A PRELIMINARY NOTE YOU WILL NEED TO TOGGLE THIS TO 'NO' IF YOU DO NOT WANT IT IN YOUR FAXED NOTE.
Dorsal Nasal Flap Text: The defect edges were debeveled with a #15 scalpel blade.  Given the location of the defect and the proximity to free margins a dorsal nasal flap was deemed most appropriate.  Using a sterile surgical marker, an appropriate dorsal nasal flap was drawn around the defect.    The area thus outlined was incised deep to adipose tissue with a #15 scalpel blade.  The skin margins were undermined to an appropriate distance in all directions utilizing iris scissors.
Melolabial Interpolation Flap Text: A decision was made to reconstruct the defect utilizing an interpolation axial flap and a staged reconstruction.  A telfa template was made of the defect.  This telfa template was then used to outline the melolabial interpolation flap.  The donor area for the pedicle flap was then injected with anesthesia.  The flap was excised through the skin and subcutaneous tissue down to the layer of the underlying musculature.  The pedicle flap was carefully excised within this deep plane to maintain its blood supply.  The edges of the donor site were undermined.   The donor site was closed in a primary fashion.  The pedicle was then rotated into position and sutured.  Once the tube was sutured into place, adequate blood supply was confirmed with blanching and refill.  The pedicle was then wrapped with xeroform gauze and dressed appropriately with a telfa and gauze bandage to ensure continued blood supply and protect the attached pedicle.
Undermining Type: Entire Wound
Mercedes Flap Text: The defect edges were debeveled with a #15 scalpel blade.  Given the location of the defect, shape of the defect and the proximity to free margins a Mercedes flap was deemed most appropriate.  Using a sterile surgical marker, an appropriate advancement flap was drawn incorporating the defect and placing the expected incisions within the relaxed skin tension lines where possible. The area thus outlined was incised deep to adipose tissue with a #15 scalpel blade.  The skin margins were undermined to an appropriate distance in all directions utilizing iris scissors.
Mart-1 - Negative Histology Text: MART-1 staining demonstrates a normal density and pattern of melanocytes along the dermal-epidermal junction. The surgical margins are negative for tumor cells.
Detail Level: Detailed
Consent (Spinal Accessory)/Introductory Paragraph: The rationale for Mohs was explained to the patient and consent was obtained. The risks, benefits and alternatives to therapy were discussed in detail. Specifically, the risks of damage to the spinal accessory nerve, infection, scarring, bleeding, prolonged wound healing, incomplete removal, allergy to anesthesia, and recurrence were addressed. Prior to the procedure, the treatment site was clearly identified and confirmed by the patient. All components of Universal Protocol/PAUSE Rule completed.
Mustarde Flap Text: The defect edges were debeveled with a #15 scalpel blade.  Given the size, depth and location of the defect and the proximity to free margins a Mustarde flap was deemed most appropriate.  Using a sterile surgical marker, an appropriate flap was drawn incorporating the defect. The area thus outlined was incised with a #15 scalpel blade.  The skin margins were undermined to an appropriate distance in all directions utilizing iris scissors.
Intermediate Repair And Flap Additional Text (Will Appearing After The Standard Complex Repair Text): The intermediate repair was not sufficient to completely close the primary defect. The remaining additional defect was repaired with the flap mentioned below.
Hemostasis: Electrocautery
Eyelid Full Thickness Repair - 93588: The eyelid defect was full thickness which required a wedge repair of the eyelid. Special care was taken to ensure that the eyelid margin was realligned when placing sutures.
Eyelid Partial Thickness Repair - 62653: The eyelid defect was partial thickness which required a wedge repair of the eyelid. Special care was taken to ensure that the eyelid margin was realligned when placing sutures.
Which Eyelid Repair Cpt Are You Using?: 36183

## 2024-02-16 NOTE — PROCEDURE: ADDITIONAL NOTES
Detail Level: Simple
Additional Notes: biopsies were done to confirm the previous bx confirmed SCC of the scalp biopsied by dr. roy.  unable to be certain of the biopsy site even with reviewing the photos so scouting biopsies were done today to histologically identify the site.
Render Risk Assessment In Note?: no

## 2024-02-26 ENCOUNTER — APPOINTMENT (RX ONLY)
Dept: URBAN - METROPOLITAN AREA CLINIC 31 | Facility: CLINIC | Age: 79
Setting detail: DERMATOLOGY
End: 2024-02-26

## 2024-02-26 DIAGNOSIS — Z48.02 ENCOUNTER FOR REMOVAL OF SUTURES: ICD-10-CM

## 2024-02-26 PROCEDURE — ? SUTURE REMOVAL (GLOBAL PERIOD)

## 2024-02-26 PROCEDURE — 99024 POSTOP FOLLOW-UP VISIT: CPT

## 2024-02-26 ASSESSMENT — LOCATION DETAILED DESCRIPTION DERM: LOCATION DETAILED: MID-FRONTAL SCALP

## 2024-02-26 ASSESSMENT — LOCATION ZONE DERM: LOCATION ZONE: SCALP

## 2024-02-26 ASSESSMENT — LOCATION SIMPLE DESCRIPTION DERM: LOCATION SIMPLE: ANTERIOR SCALP

## 2024-02-26 NOTE — PROCEDURE: SUTURE REMOVAL (GLOBAL PERIOD)
Detail Level: Detailed
Add 50753 Cpt? (Important Note: In 2017 The Use Of 88040 Is Being Tracked By Cms To Determine Future Global Period Reimbursement For Global Periods): yes

## 2024-04-01 ENCOUNTER — APPOINTMENT (RX ONLY)
Dept: URBAN - METROPOLITAN AREA CLINIC 31 | Facility: CLINIC | Age: 79
Setting detail: DERMATOLOGY
End: 2024-04-01

## 2024-04-01 DIAGNOSIS — Z48.02 ENCOUNTER FOR REMOVAL OF SUTURES: ICD-10-CM

## 2024-04-01 PROCEDURE — 99024 POSTOP FOLLOW-UP VISIT: CPT

## 2024-04-01 PROCEDURE — ? SUTURE REMOVAL (GLOBAL PERIOD)

## 2024-04-01 ASSESSMENT — LOCATION ZONE DERM: LOCATION ZONE: SCALP

## 2024-04-01 ASSESSMENT — LOCATION DETAILED DESCRIPTION DERM: LOCATION DETAILED: LEFT SUPERIOR PARIETAL SCALP

## 2024-04-01 ASSESSMENT — LOCATION SIMPLE DESCRIPTION DERM: LOCATION SIMPLE: SCALP

## 2024-04-01 NOTE — PROCEDURE: SUTURE REMOVAL (GLOBAL PERIOD)
Detail Level: Detailed
Add 36500 Cpt? (Important Note: In 2017 The Use Of 37563 Is Being Tracked By Cms To Determine Future Global Period Reimbursement For Global Periods): yes

## 2024-05-07 ENCOUNTER — APPOINTMENT (RX ONLY)
Dept: URBAN - METROPOLITAN AREA CLINIC 31 | Facility: CLINIC | Age: 79
Setting detail: DERMATOLOGY
End: 2024-05-07

## 2024-05-07 DIAGNOSIS — L57.0 ACTINIC KERATOSIS: ICD-10-CM

## 2024-05-07 DIAGNOSIS — L81.4 OTHER MELANIN HYPERPIGMENTATION: ICD-10-CM

## 2024-05-07 DIAGNOSIS — Z71.89 OTHER SPECIFIED COUNSELING: ICD-10-CM

## 2024-05-07 DIAGNOSIS — Z85.828 PERSONAL HISTORY OF OTHER MALIGNANT NEOPLASM OF SKIN: ICD-10-CM

## 2024-05-07 DIAGNOSIS — D22 MELANOCYTIC NEVI: ICD-10-CM

## 2024-05-07 DIAGNOSIS — D18.0 HEMANGIOMA: ICD-10-CM

## 2024-05-07 DIAGNOSIS — L85.3 XEROSIS CUTIS: ICD-10-CM

## 2024-05-07 DIAGNOSIS — L82.1 OTHER SEBORRHEIC KERATOSIS: ICD-10-CM

## 2024-05-07 PROBLEM — D18.01 HEMANGIOMA OF SKIN AND SUBCUTANEOUS TISSUE: Status: ACTIVE | Noted: 2024-05-07

## 2024-05-07 PROBLEM — D22.5 MELANOCYTIC NEVI OF TRUNK: Status: ACTIVE | Noted: 2024-05-07

## 2024-05-07 PROCEDURE — 99213 OFFICE O/P EST LOW 20 MIN: CPT | Mod: 25,24

## 2024-05-07 PROCEDURE — 17003 DESTRUCT PREMALG LES 2-14: CPT | Mod: 79

## 2024-05-07 PROCEDURE — ? LIQUID NITROGEN

## 2024-05-07 PROCEDURE — 17000 DESTRUCT PREMALG LESION: CPT | Mod: 79

## 2024-05-07 PROCEDURE — ? COUNSELING

## 2024-05-07 ASSESSMENT — LOCATION DETAILED DESCRIPTION DERM
LOCATION DETAILED: RIGHT MEDIAL FOREHEAD
LOCATION DETAILED: RIGHT SUPERIOR PARIETAL SCALP
LOCATION DETAILED: RIGHT SUPERIOR UPPER BACK
LOCATION DETAILED: NASAL DORSUM
LOCATION DETAILED: RIGHT FOREHEAD
LOCATION DETAILED: RIGHT MID-UPPER BACK
LOCATION DETAILED: RIGHT INFERIOR UPPER BACK
LOCATION DETAILED: LEFT SUPERIOR FOREHEAD

## 2024-05-07 ASSESSMENT — LOCATION ZONE DERM
LOCATION ZONE: NOSE
LOCATION ZONE: SCALP
LOCATION ZONE: TRUNK
LOCATION ZONE: FACE

## 2024-05-07 ASSESSMENT — LOCATION SIMPLE DESCRIPTION DERM
LOCATION SIMPLE: RIGHT FOREHEAD
LOCATION SIMPLE: SCALP
LOCATION SIMPLE: NOSE
LOCATION SIMPLE: LEFT FOREHEAD
LOCATION SIMPLE: RIGHT UPPER BACK

## 2024-07-10 ENCOUNTER — APPOINTMENT (RX ONLY)
Dept: URBAN - METROPOLITAN AREA CLINIC 31 | Facility: CLINIC | Age: 79
Setting detail: DERMATOLOGY
End: 2024-07-10

## 2024-07-10 DIAGNOSIS — L57.0 ACTINIC KERATOSIS: ICD-10-CM

## 2024-07-10 PROBLEM — D48.5 NEOPLASM OF UNCERTAIN BEHAVIOR OF SKIN: Status: ACTIVE | Noted: 2024-07-10

## 2024-07-10 PROCEDURE — ? BIOPSY BY SHAVE METHOD

## 2024-07-10 PROCEDURE — 99212 OFFICE O/P EST SF 10 MIN: CPT | Mod: 25

## 2024-07-10 PROCEDURE — 11102 TANGNTL BX SKIN SINGLE LES: CPT

## 2024-07-10 PROCEDURE — 11103 TANGNTL BX SKIN EA SEP/ADDL: CPT

## 2024-07-10 PROCEDURE — ? ADDITIONAL NOTES

## 2024-07-10 PROCEDURE — ? COUNSELING

## 2024-07-10 ASSESSMENT — LOCATION DETAILED DESCRIPTION DERM: LOCATION DETAILED: LEFT SUPERIOR PARIETAL SCALP

## 2024-07-10 ASSESSMENT — LOCATION SIMPLE DESCRIPTION DERM: LOCATION SIMPLE: SCALP

## 2024-07-10 ASSESSMENT — LOCATION ZONE DERM: LOCATION ZONE: SCALP

## 2024-07-10 NOTE — PROCEDURE: ADDITIONAL NOTES
Additional Notes: Reassess in September for field treatment on the scalp, likely Efudex
Detail Level: Simple
Render Risk Assessment In Note?: no

## 2024-07-10 NOTE — PROCEDURE: BIOPSY BY SHAVE METHOD
Detail Level: Detailed
Depth Of Biopsy: dermis
Was A Bandage Applied: Yes
Size Of Lesion In Cm: 0.4
X Size Of Lesion In Cm: 0
Biopsy Type: H and E
Biopsy Method: Dermablade
Anesthesia Type: 0.5% lidocaine without epinephrine
Anesthesia Volume In Cc: 0.5
Hemostasis: Silver Nitrate
Wound Care: Petrolatum
Dressing: bandage
Destruction After The Procedure: No
Type Of Destruction Used: Curettage
Curettage Text: The wound bed was treated with curettage after the biopsy was performed.
Cryotherapy Text: The wound bed was treated with cryotherapy after the biopsy was performed.
Electrodesiccation Text: The wound bed was treated with electrodesiccation after the biopsy was performed.
Electrodesiccation And Curettage Text: The wound bed was treated with electrodesiccation and curettage after the biopsy was performed.
Silver Nitrate Text: The wound bed was treated with silver nitrate after the biopsy was performed.
Lab: [id number]
Lab Facility: 
Consent: Written consent was obtained and risks were reviewed including but not limited to scarring, infection, bleeding, scabbing, incomplete removal, nerve damage and allergy to anesthesia.
Post-Care Instructions: I reviewed with the patient in detail post-care instructions. Patient is to keep the biopsy site dry overnight, and then apply bacitracin twice daily until healed. Patient may apply hydrogen peroxide soaks to remove any crusting.
Notification Instructions: Patient will be notified of biopsy results. However, patient instructed to call the office if not contacted within 2 weeks.
Billing Type: Third-Party Bill
Information: Selecting Yes will display possible errors in your note based on the variables you have selected. This validation is only offered as a suggestion for you. PLEASE NOTE THAT THE VALIDATION TEXT WILL BE REMOVED WHEN YOU FINALIZE YOUR NOTE. IF YOU WANT TO FAX A PRELIMINARY NOTE YOU WILL NEED TO TOGGLE THIS TO 'NO' IF YOU DO NOT WANT IT IN YOUR FAXED NOTE.
Size Of Lesion In Cm: 0.6

## 2024-08-14 PROBLEM — I70.209 SUPERFICIAL OCCLUSION OF FEMORAL ARTERY (HCC): Status: ACTIVE | Noted: 2024-01-01

## 2024-08-14 PROBLEM — D63.8 ANEMIA, CHRONIC DISEASE: Status: ACTIVE | Noted: 2024-01-01

## 2024-08-14 PROBLEM — S20.214A: Status: ACTIVE | Noted: 2024-01-01

## 2024-08-14 PROBLEM — E04.1 THYROID NODULE: Status: ACTIVE | Noted: 2024-01-01

## 2024-08-14 PROBLEM — N18.4 CKD (CHRONIC KIDNEY DISEASE) STAGE 4, GFR 15-29 ML/MIN (HCC): Status: ACTIVE | Noted: 2024-01-01

## 2024-08-14 PROBLEM — I10 PRIMARY HYPERTENSION: Status: ACTIVE | Noted: 2024-01-01

## 2024-08-14 PROBLEM — E11.9 TYPE 2 DIABETES MELLITUS (HCC): Status: ACTIVE | Noted: 2024-01-01

## 2024-08-14 PROBLEM — I63.9 ACUTE ISCHEMIC STROKE (HCC): Status: ACTIVE | Noted: 2024-01-01

## 2024-08-14 NOTE — Clinical Note
Closure device placed in the right femoral artery.   [facility]  REF: [id number]  LOT:[id number]  EXP:2025-04-23

## 2024-08-15 PROBLEM — K92.1 MELENA: Status: ACTIVE | Noted: 2024-01-01

## 2024-08-15 PROBLEM — E78.5 HYPERLIPIDEMIA: Chronic | Status: ACTIVE | Noted: 2024-01-01

## 2024-08-15 PROBLEM — E11.9 TYPE 2 DIABETES MELLITUS (HCC): Chronic | Status: ACTIVE | Noted: 2024-01-01

## 2024-08-15 PROBLEM — I10 PRIMARY HYPERTENSION: Chronic | Status: ACTIVE | Noted: 2024-01-01

## 2024-08-15 PROBLEM — E87.8 ELECTROLYTE IMBALANCE: Chronic | Status: ACTIVE | Noted: 2024-01-01

## 2024-08-15 PROBLEM — I70.209 SUPERFICIAL OCCLUSION OF FEMORAL ARTERY (HCC): Chronic | Status: ACTIVE | Noted: 2024-01-01

## 2024-08-15 PROBLEM — K62.5 BRBPR (BRIGHT RED BLOOD PER RECTUM): Status: ACTIVE | Noted: 2024-01-01

## 2024-08-15 PROBLEM — E87.8 ELECTROLYTE IMBALANCE: Status: ACTIVE | Noted: 2024-01-01

## 2024-08-15 PROBLEM — E78.5 HYPERLIPIDEMIA: Status: ACTIVE | Noted: 2024-01-01

## 2024-08-15 NOTE — THERAPY
Occupational Therapy   Initial Evaluation     Patient Name: Jaclyn Goddard  Age:  79 y.o., Sex:  female  Medical Record #: [id number]  Today's Date: 8/15/2024     Precautions  Precautions: Fall Risk  Comments: SBP goal < 160; s/p TNK; seizure precautions    Assessment    Patient is 79 y.o. female admitted after a fall while transferring from her scooter with a possible head strike. Pt s/p TNK and mechanical thrombectomy. MRI revealed small infarct in the L basal ganglia and posterior L parietal cortex. Other pertinent medical history includes HTN, prior CVA, DM, CKD stage IV, recent GIB, anemia, and thyroid nodule. Bedrest orders removed from chart and pt cleared for OOB activity. Pt seen for OT evaluation. Pt required max-total A to don socks, for bed mobility, and to stand. After pt retuned to supine, pt had and episode of copious, dark green emesis. Pt assisted to upright position and RN notified/present. Pt current functional performance limited by impaired cognition, impaired balance, generalized weakness, and impaired activity tolerance. Pt will benefit from skilled OT while admitted to acute care.     Patient seen for team evaluation with Physical Therapist for the following reason(s):  Patient required 2 person assistance for safety and to provide effective interventions. Each discipline assisted patient with appropriate and separate goals. Due to the medical complexity, the skill of both practitioners is needed to monitor vitals, patient status, and adjust the intervention to fit the patient's needs and goals.  Occupational Therapist facilitated assessment of ADL performance and additional factors required for functional performance while Physical Therapist simultaneously treated pt according to POC.     Plan    Occupational Therapy Initial Treatment Plan   Treatment Interventions: Self Care / Activities of Daily Living, Adaptive Equipment, Manual Therapy Techniques, Neuro Re-Education / Balance, Therapeutic  Exercises, Therapeutic Activity  Treatment Frequency: 4 Times per Week  Duration: Until Therapy Goals Met    DC Equipment Recommendations: Unable to determine at this time  Discharge Recommendations: Recommend post-acute placement for additional occupational therapy services prior to discharge home      Objective     08/15/24 1322   Prior Living Situation   Prior Services Unable To Determine At This Time   Housing / Facility Unable To Determine At This Time   Steps Into Home   (ramp)   Equipment Owned Scooter   Lives with - Patient's Self Care Capacity Spouse   Comments Pt unable to provide history at time of eval. History collected via chart review.   Prior Level of ADL Function   Self Feeding Independent   Grooming / Hygiene Independent   Bathing Independent   Dressing Independent   Toileting Independent   Comments Per chart, pt was I prior to this admission   Prior Level of IADL Function   Medication Management Independent   Laundry Independent   Kitchen Mobility Independent   Finances Independent   Home Management Independent   Shopping Independent   Prior Level Of Mobility Independent With Device in Community;Independent With Device in Home   Comments Per chart, pt was I prior to this admission   Precautions   Precautions Fall Risk   Comments SBP goal < 160; s/p TNK; seizure precautions   Vitals   Vitals Comments Seated EOB: 138/65, 74, 97; Post activity: 179/80, 80, 97   Pain   Pain Scales Non Verbal Scale   Non Verbal Descriptors   Non Verbal Scale  Calm   Cognition    Cognition / Consciousness X   Speech/ Communication Delayed Responses   Orientation Level   (Oriented to location)   Level of Consciousness Responds to voice   Ability To Follow Commands Unable to Follow 1 Step Commands  (Pt followed 1 step commands ~25-50% of the time)   Safety Awareness Impaired   New Learning Impaired   Attention Impaired   Sequencing Impaired   Initiation Impaired   Comments Cues to keep eyes open throughout session    Passive ROM Upper Body   Passive ROM Upper Body WDL   Active ROM Upper Body   Comments Didn't follow commands for formal assessment. Noted to move B UE during session   Strength Upper Body   Comments Used UE to aid with balance while seated EOB   Sensation Upper Body   Upper Extremity Sensation  Not Tested   Upper Body Muscle Tone   Upper Body Muscle Tone  WDL   Coordination Upper Body   Coordination Not Tested   Balance Assessment   Sitting Balance (Static) Trace +   Sitting Balance (Dynamic) Trace   Standing Balance (Static) Trace   Standing Balance (Dynamic) Dependent   Weight Shift Sitting Poor   Weight Shift Standing Absent   Comments w/ HHA x2   Bed Mobility    Supine to Sit Maximal Assist   Sit to Supine Maximal Assist   Scooting Maximal Assist   Rolling Maximal Assist to Rt.;Maximum Assist to Lt.   Comments HOB elevated, x2 assist   ADL Assessment   Lower Body Dressing Total Assist   Functional Mobility   Sit to Stand Maximal Assist   Mobility supine>EOB>stand>supine   Comments w/ HHA x2   ICU Target Mobility Level   ICU Mobility - Targeted Level Level 2   Visual Perception   Visual Perception  Not Tested   Comments required cues to keep eyes open throughout   Activity Tolerance   Sitting in Chair NT   Sitting Edge of Bed <10 min   Standing <30 seconds   Comments limited by weakness, fatigue   Patient / Family Goals   Patient / Family Goal #1 none stated   Short Term Goals   Short Term Goal # 1 Pt will maintain balance while seated EOB with supv in prep for ADL participation   Short Term Goal # 2 Pt will perform seated gown change with supv   Short Term Goal # 3 Pt will perform seated g/h w/ supv   Education Group   Education Provided Role of Occupational Therapist   Role of Occupational Therapist Patient Response Patient;Acceptance;Explanation;Reinforcement Needed   Occupational Therapy Initial Treatment Plan    Treatment Interventions Self Care / Activities of Daily Living;Adaptive Equipment;Manual  Therapy Techniques;Neuro Re-Education / Balance;Therapeutic Exercises;Therapeutic Activity   Treatment Frequency 4 Times per Week   Duration Until Therapy Goals Met   Problem List   Problem List Decreased Active Daily Living Skills;Decreased Homemaking Skills;Decreased Functional Mobility;Decreased Activity Tolerance;Safety Awareness Deficits / Cognition;Impaired Cognitive Function;Impaired Postural Control / Balance

## 2024-08-15 NOTE — THERAPY
Speech Language Therapy Contact Note    Patient Name: Jaclyn Goddard  Age:  79 y.o., Sex:  female  Medical Record #: [id number]  Today's Date: 8/15/2024    Discussed missed therapy with RN       08/15/24 1314   Treatment Variance   Reason For Missed Therapy Medical - Patient on Hold from Therapy;Medical - Patient not Able To Participate   Initial Contact Note    Initial Contact Note  Order Received and Verified, Speech Therapy Evaluation in Progress with Full Report to Follow.   Interdisciplinary Plan of Care Collaboration   IDT Collaboration with  Nursing   Collaboration Comments Orders received for clinical swallow and cognitive evaluations. Per RN, patient with significant emesis while working with OT and PT. Service will hold and attempt as able/medically appropriate.

## 2024-08-15 NOTE — PROGRESS NOTES
Neurology Progress Note  Neurohospitalist Service, [facility]    Referring Physician: Júnior Hudson M.D.    Chief Complaint   Patient presents with    Possible Stroke     LKW 1645  2000 mg Keppra for seizure prophylaxis  TNK @ 1915       HPI: Refer to initial documented Neurology H&P, as detailed in the patient's chart.    Interval History 8/15: No issues overnight.    Review of systems: In addition to what is detailed in the HPI and/or updated in the interval history, all other systems reviewed and are negative.    Past Medical History:    has no past medical history on file.    FHx:  family history is not on file.    SHx:       Medications:    Current Facility-Administered Medications:     atorvastatin (Lipitor) tablet 80 mg, 80 mg, Oral, Q EVENING, JOHN Torre    hydrALAZINE (Apresoline) injection 10 mg, 10 mg, Intravenous, Q2HRS PRN, Lex Pnenington M.D., 10 mg at 08/15/24 0012    niCARdipine (Cardene) 25 mg in  mL Standard Infusion, 0-15 mg/hr, Intravenous, Continuous, Lex Pennington M.D., Last Rate: 25 mL/hr at 08/15/24 0306, 2.5 mg/hr at 08/15/24 0306    Respiratory Therapy Consult, , Nebulization, Continuous RT, Susana Morales    acetaminophen (Tylenol) tablet 650 mg, 650 mg, Oral, Q6HRS PRN, Susana Morales    senna-docusate (Pericolace Or Senokot S) 8.6-50 MG per tablet 2 Tablet, 2 Tablet, Oral, Q EVENING **AND** polyethylene glycol/lytes (Miralax) Packet 1 Packet, 1 Packet, Oral, QDAY PRN, Susana Morales    ondansetron (Zofran) syringe/vial injection 4 mg, 4 mg, Intravenous, Q4HRS PRN, Susana Morales, 4 mg at 08/15/24 0118    ondansetron (Zofran ODT) dispertab 4 mg, 4 mg, Oral, Q4HRS PRN, Susana Morales    insulin regular (HumuLIN R,NovoLIN R) injection, 2-9 Units, Subcutaneous, Q6HRS, 2 Units at 08/15/24 0523 **AND** POC blood glucose manual result, , , Q6H **AND** NOTIFY MD and PharmD, , , Once **AND** Administer 20 grams of glucose (approximately 8  ounces of fruit juice) every 15 minutes PRN FSBG less than 70 mg/dL, , , PRN **AND** dextrose 50% (D50W) injection 25 g, 25 g, Intravenous, Q15 MIN PRN, Susana L. Latona    pantoprazole (Protonix) injection 40 mg, 40 mg, Intravenous, BID, Susana L. Latona, 40 mg at 08/15/24 0528    Physical Examination:     Vitals:    08/15/24 0600 08/15/24 0615 08/15/24 0630 08/15/24 0645   BP: (!) 81/59 135/60 (!) 152/69 (!) 148/68   Pulse: 73 72 73 72   Resp: (!) 36 13 18 15   Temp: 36.1 °C (97 °F)      TempSrc: Temporal      SpO2: 98% 99% 99% 96%   Weight:       Height:             NEUROLOGICAL EXAM:       Patient is awake and alert oriented to self, place, situation  Able to follow commands.  Able to name objects.  Repeat words however some difficulty with phrases.  Pupils equal reactive.  There is no gaze preference.  Blinks to threat on both sides.  Mild right facial weakness.  Hearing intact.  Sustained antigravity strength in the left arm and leg.  She has antigravity strength in the right upper with a drift.  Briefly antigravity in the right lower.  Sensation appears to be intact to soft touch.  However questionable neglect on the right compared to left with double stimulation.  Downgoing toes.       NIH Stroke Scale:    1a. Level of Consciousness:  0    1b. LOC Questions (Month, age):  0    1c. LOC Commands (Open/close eyes make fist/let go):  0    2.   Best Gaze (Eyes open - patient follows examiner's finger on face):  0  3.   Visual Fields (introduce visual stimulus/threat to patient's field quadrants):   0  4.   Facial Paresis (Show teeth, raise eyebrows and squeeze eyes shut):  1  5a. Motor Arm - Left (Elevate arm to 90 degrees if patient is sitting, 45 degrees if  Supine): 0   5b. Motor Arm - Right (Elevate arm to 90 degrees if patient is sitting, 45 degrees if supine): 1  6a. Motor Leg - Left (Elevate leg 30 degrees with patient supine):  0  6b. Motor Leg - Right  (Elevate leg 30 degrees with patient supine):  2  7.    "Limb Ataxia (Finger-nose, heel down shin):  0    8.   Sensory (Pin prick to face, arm, trunk and leg - compare side to side):  0  9.  Best Language (Name item, describe a picture and read sentences):  1  10. Dysarthria (Evaluate speech clarity by patient repeating listed words):  1  11. Extinction and Inattention (Use information from prior testing to identify neglect or double simultaneous stimuli testing):  1    Total NIH Score:  7                  Objective Data:    Labs:  No results found for: \"PROTHROMBTM\", \"INR\"   Lab Results   Component Value Date/Time    WBC 10.0 08/15/2024 05:09 AM    RBC 3.30 (L) 08/15/2024 05:09 AM    HEMOGLOBIN 9.9 (L) 08/15/2024 05:09 AM    HEMATOCRIT 30.7 (L) 08/15/2024 05:09 AM    MCV 93.0 08/15/2024 05:09 AM    MCH 30.0 08/15/2024 05:09 AM    MCHC 32.2 08/15/2024 05:09 AM    MPV 11.1 08/15/2024 05:09 AM    NEUTSPOLYS 63.2 08/14/2024 05:57 PM    LYMPHOCYTES 25.3 08/14/2024 05:57 PM    MONOCYTES 7.5 08/14/2024 05:57 PM    EOSINOPHILS 2.2 08/14/2024 05:57 PM    BASOPHILS 1.8 (H) 08/14/2024 05:57 PM      Lab Results   Component Value Date/Time    SODIUM 135 08/14/2024 10:53 PM    POTASSIUM 2.8 (L) 08/14/2024 10:53 PM    CHLORIDE 95 (L) 08/14/2024 10:53 PM    CO2 22 08/14/2024 10:53 PM    GLUCOSE 124 (H) 08/14/2024 10:53 PM    GLUCOSE 129 (H) 08/14/2024 06:06 PM    BUN 76 (H) 08/14/2024 10:53 PM    CREATININE 2.77 (H) 08/14/2024 10:53 PM    GLOMRATE 18 (L) 10/25/2023 10:52 AM      Lab Results   Component Value Date/Time    CHOLSTRLTOT 290 (H) 08/14/2024 10:53 PM     (H) 08/14/2024 10:53 PM    HDL 66 08/14/2024 10:53 PM    TRIGLYCERIDE 263 (H) 08/14/2024 10:53 PM       Lab Results   Component Value Date/Time    ALKPHOSPHAT 124 (H) 08/14/2024 10:53 PM    ASTSGOT 19 08/14/2024 10:53 PM    ALTSGPT 8 08/14/2024 10:53 PM    TBILIRUBIN 0.2 08/14/2024 10:53 PM        Imaging/Testing:  CT-CHEST,ABDOMEN,PELVIS WITH   Final Result         1.  Occlusion of left superficial femoral artery with " stent in place.   2.  Bilateral indeterminate thyroid nodules, recommend follow-up thyroid sonography as clinically appropriate for further characterization due to nodule size.   3.  Gallbladder distention, additional workup as clinically appropriate.   4.  Atherosclerosis and atherosclerotic coronary artery disease      CT-HEAD W/O   Final Result         1.  No acute intracranial abnormality is identified, there are nonspecific white matter changes, commonly associated with small vessel ischemic disease.  Associated mild cerebral atrophy is noted.   2.  Atherosclerosis.               IR-THROMBO MECHANICAL ARTERY,INIT    (Results Pending)   MR-BRAIN-W/O    (Results Pending)   CT-HEAD W/O    (Results Pending)   EC-ECHOCARDIOGRAM COMPLETE W/O CONT    (Results Pending)         Assessment and Plan:    79-year-old female transferred in outside hospital status post TNK found to have a left M1 distal occlusion.  Status post thrombectomy with a TICI score of 2B.  Patient is done remarkably well compared to her presentation.  Stroke scale currently  7 compared to 21.  Etiology of the infarct unknown.   MRI brain today.  Anticipate starting antiplatelet therapy tonight at the 24-hour mis which be around 7 PM today if repeat imaging does not show any concerning signs.    Plan:  1 MRI brain  2.  Keep the blood pressure between 110-160 systolic.  3.  Continue telemonitoring  4.  TTE without bubble  5.  PT/OT and speech therapy  6.  Continue statin for an LDL goal below 70  7.  Maintain normoglycemia        Spent over 55 minutes examining the patient, reviewing vitals and labs, overnight, going over the care plan with the primary team.    This chart was partially generated using voice recognition technology and sound alike word replacement may be present, best efforts were made to make the chart accurate.    Jp Seals MD  Board Certified Neurology, ABPN  (t) [phone]

## 2024-08-15 NOTE — THERAPY
Physical Therapy   Initial Evaluation     Patient Name: Jaclyn Goddard  Age:  79 y.o., Sex:  female  Medical Record #: [id number]  Today's Date: 8/15/2024     Precautions  Precautions: Fall Risk;Other (See Comments)  Comments: SBP goal < 160; s/p TNK; seizure precautions    Assessment  Patient is 79 y.o. female admitted on following a fall while transferring from her scooter w/ a possible head strike     Received TNK @ 1915 pm on 8/14/24; Underwent mechanical thrombectomy 8/14/24    Currently been managed for acute ischemic stroke, contusion of middle frontal wall of thorax     MRI Brain 8/15: Small infarcts involving the left basal ganglia and posterior left parietal cortex.     PMH:  HTN, Prior CVA w/ unknown deficits, DM, CKD stage IV, recent GIB, anemia, superficial occlusion of L femoral artery s/p stent, thyroid nodule      Patient seen for PT evaluation. Patient in bed, agreeable for the session. Able to demonstrate functional mobility tasks as detailed below. Currently appears to be below baseline level of functional mobility. Will continue to benefit from PT services to help improve overall functional mobility. Recommend post-acute placement at this time.     Plan    Physical Therapy Initial Treatment Plan   Treatment Plan : Bed Mobility, Equipment, Family / Caregiver Training, Gait Training, Neuro Re-Education / Balance, Stair Training, Therapeutic Activities, Therapeutic Exercise  Treatment Frequency: 4 Times per Week  Duration: Until Therapy Goals Met    DC Equipment Recommendations: Unable to determine at this time  Discharge Recommendations: Recommend post-acute placement for additional physical therapy services prior to discharge home    Objective     08/15/24 1316   Time In/Time Out   Therapy Start Time 1300   Therapy End Time 1316   Total Therapy Time 16   Initial Contact Note    Initial Contact Note Order Received and Verified, Physical Therapy Evaluation in Progress with Full Report to Follow.    Precautions   Precautions Fall Risk;Other (See Comments)   Comments SBP goal < 160; s/p TNK; seizure precautions   Vitals   Pulse 73   Patient BP Position Eastman's Position   Blood Pressure  (!) 144/66   Pulse Oximetry 98 %   O2 Delivery Device None - Room Air   Vitals Comments Seated EOB: 138/65, 74, 97; Post activity: 179/80, 80, 97   Pain   Pain Scales Non Verbal Scale   Non Verbal Descriptors   Non Verbal Scale  Calm;Unlabored Breathing   Prior Living Situation   Equipment Owned Scooter   Lives with - Patient's Self Care Capacity Spouse   Comments Patient unable to provide above information at this time. Obtained some information from CM note.   Prior Level of Functional Mobility   Bed Mobility Independent   Transfer Status Independent   Ambulation Independent   Comments Per CM note and RN, patient is independent with mobility at baseline. Patient uses scooter outdoors.   Cognition    Cognition / Consciousness X   Speech/ Communication Delayed Responses   Orientation Level Other (Comment);Not Oriented to Time;Not Oriented to Reason  (Oriented to place)   Level of Consciousness Responds to voice   Ability To Follow Commands 1 Step  (Intermittently)   Safety Awareness Impaired   New Learning Impaired   Attention Impaired   Sequencing Impaired   Initiation Impaired   Comments Patient had her eyes closed most of the session, would open her eyes for brief minute with cues.   Passive ROM Lower Body   Passive ROM Lower Body X   Comments Decreased B/L dorsiflexion-possibly due to muscle stiffness   Active ROM Lower Body    Active ROM Lower Body  X   Comments Seated EOB-able to perform partial knee extension B/L, R less than L; able to initiate hip flexion and ankle DF/PF B/L; In semi-fowlers position-spontaneous L hip knee flexion seen   Strength Lower Body   Lower Body Strength  X   Comments Grossly B/L hip flexion 2-/5, B/L knee extension 3-/5, B/L ankle DF/PF 2-/5; LLE movements more spontaneous than RLE    Sensation Lower Body   Lower Extremity Sensation   X   Comments Reports feeling lightouch in B/L LE; unable to accurately assess   Lower Body Muscle Tone   Lower Body Muscle Tone  WDL   Coordination Lower Body    Coordination Lower Body  X   Comments Unable to assess due to inadequate strength and impaired cognition?   Vision   Vision Comments Unable to assess-kept her eyes closed most of the session   Other Treatments   Other Treatments Provided Patient threw up large amount of green color vomitus when placed in sidelying while returning back to bed, patient was quickly assisted to sitting upright, RN was alerted immediately. Patient was left in upright sitting at end of session.   Balance Assessment   Sitting Balance (Static) Trace +   Sitting Balance (Dynamic) Trace   Standing Balance (Static) Trace   Standing Balance (Dynamic) Dependent   Weight Shift Sitting Poor   Weight Shift Standing Absent   Comments Seated EOB: R side trunk lean, impaired righting reactions; Standing with hand held assist x 2   Bed Mobility    Supine to Sit Maximal Assist   Sit to Supine Maximal Assist   Scooting Maximal Assist   Rolling Maximal Assist to Rt.   Comments HOB raised, towards R side   Gait Analysis   Gait Level Of Assist Unable to Participate   Functional Mobility   Sit to Stand Maximal Assist  (x2 person assist)   Bed, Chair, Wheelchair Transfer Unable to Participate   Mobility Bed-EOB-sitting balance/posture/tolerance-sit-stand-standing balance/ posture/vxdqleagz-RQW-uzulpkqed-upright sitting   Comments Hand held assist; cues for forward pelvic posture, upright trunk, weight shift   6 Clicks Assessment - How much HELP from from another person do you currently need... (If the patient hasn't done an activity recently, how much help from another person do you think he/she would need if he/she tried?)   Turning from your back to your side while in a flat bed without using bedrails? 2   Moving from lying on your back to  sitting on the side of a flat bed without using bedrails? 2   Moving to and from a bed to a chair (including a wheelchair)? 1   Standing up from a chair using your arms (e.g., wheelchair, or bedside chair)? 2   Walking in hospital room? 1   Climbing 3-5 steps with a railing? 1   6 clicks Mobility Score 9   Activity Tolerance   Sitting Edge of Bed < 10 mins   Patient / Family Goals    Patient / Family Goal #1 None stated   Short Term Goals    Short Term Goal # 1 Patient will perform supine-sit, sit-supine with HOB raised, with bed rails, with CGA in 6 visits to progress with bed mobility   Short Term Goal # 2 Patient will sit EOB with fair balance with supervision for 20 minutes in 6 visits to progress with upright sitting balance & tolerance   Short Term Goal # 3 Patient will perform sit-stand with LRAD with Min A in 6 visits to progress with functional mobility   Short Term Goal # 4 Patient will perform chair transfers with LRAD with Min A in 6 visits to progress with transfers   Short Term Goal # 5 Patient will ambulate 25 feet with LRAD with Min A in 6 visits to progress with distance of ambulation   Education Group   Education Provided Role of Physical Therapist   Role of Physical Therapist Patient Response Patient;Acceptance;Explanation;Verbal Demonstration   Physical Therapy Initial Treatment Plan    Treatment Plan  Bed Mobility;Equipment;Family / Caregiver Training;Gait Training;Neuro Re-Education / Balance;Stair Training;Therapeutic Activities;Therapeutic Exercise   Treatment Frequency 4 Times per Week   Duration Until Therapy Goals Met   Problem List    Problems Impaired Bed Mobility;Impaired Transfers;Impaired Ambulation;Functional ROM Deficit;Functional Strength Deficit;Impaired Balance;Impaired Coordination;Decreased Activity Tolerance;Motor Planning / Sequencing   Anticipated Discharge Equipment and Recommendations   DC Equipment Recommendations Unable to determine at this time   Discharge  Recommendations Recommend post-acute placement for additional physical therapy services prior to discharge home   Interdisciplinary Plan of Care Collaboration   IDT Collaboration with  Nursing;Occupational Therapist   Patient Position at End of Therapy Seated;In Bed;Bed Alarm On;Call Light within Reach;Tray Table within Reach   Session Information   Date / Session Number  8/15-1(1/4, 8/21)       Patient seen for team evaluation with Occupational Therapist for the following reason(s):  Patient required 2 person assistance for safety and to provide effective interventions. Each discipline assisted patient with appropriate and separate goals. Due to the medical complexity, the skill of both practitioners is needed to monitor vitals, patient status, and adjust the intervention to fit the patient's needs and goals.

## 2024-08-15 NOTE — DISCHARGE PLANNING
[facility] Transitional Care Coordination     Referral from: Dr Morales  Insurance Provider on Facesheet: Uninsured  Potential Rehab Diagnosis: Stroke     Chart review indicates patient may have on going medical management and may have therapy needs to possibly meet inpatient rehab facility criteria with the goal of returning to community.     D/C support: TBD     Physiatry consultation pended per protocol. Pending therapy evals as appropriate, TCC will follow.     Thank you for the referral.

## 2024-08-15 NOTE — PROGRESS NOTES
"Critical Care Progress Note    Date of admission  8/14/2024    Chief Complaint  Stroke    Hospital Course  Jaclyn Goddard is a 79-year-old female with PMH significant for DM2, HTN, CKD 4, chronic anemia, CVA with unknown residual deficits, and recent GIB who transferred from [facility] as a stroke as well as possible seizure-like activity.  She presented to OSH via EMS after sustaining a fall from her scooter, striking the right side of her head.  Initial NIH 23.  She received TNKase at 1915 with repeat NIH 10.  CT head showed left M1 thrombus.  She transferred here and went directly to IR for mechanical thrombectomy with TICI 2B flow and was admitted to the ICU postprocedure.    Interval Problem Update  Reviewed last 24 hour events:  Admitted post-IR overnight. Groin bleed. Hgb stable.  Tmax 97.3  SR 70's PVC's  SBP's 130-170's. Actively titrating Nicardipine, currently at 2.5  Right groin without hematoma.  Neuro: sleepy, awakens to voice, following commands. Softly spoken, mumbles. Moving all, drift RUE.  RASS -1, no sedation.  NPO pending SLP  PIV x 2, Phipps  Mobility 2 - OK to advance prior to 24 TNK/Thrombectomy time    Review of Systems  Review of Systems   Reason unable to perform ROS: Limited due to medical condition. Nods \"yes/no\" to simple questions.        Vital Signs for last 24 hours   Temp:  [35.9 °C (96.7 °F)-36.3 °C (97.3 °F)] 36.1 °C (97 °F)  Pulse:  [66-90] 73  Resp:  [9-52] 13  BP: ()/(55-88) 144/67  SpO2:  [92 %-100 %] 98 %    Hemodynamic parameters for last 24 hours       Respiratory Information for the last 24 hours       Physical Exam   Physical Exam  Vitals and nursing note reviewed.   Constitutional:       General: She is sleeping. She is not in acute distress.     Appearance: She is ill-appearing. She is not toxic-appearing.      Interventions: Nasal cannula in place.   HENT:      Head: Normocephalic.      Nose: Nose normal.      Mouth/Throat:      Lips: Pink.      Mouth: " Mucous membranes are moist.   Eyes:      Pupils: Pupils are equal, round, and reactive to light.   Cardiovascular:      Rate and Rhythm: Normal rate and regular rhythm.      Pulses: Normal pulses.      Heart sounds: Normal heart sounds.   Pulmonary:      Effort: Pulmonary effort is normal.      Breath sounds: Normal breath sounds. No wheezing or rhonchi.   Abdominal:      General: Bowel sounds are normal.      Palpations: Abdomen is soft.   Musculoskeletal:      Right lower leg: No edema.      Left lower leg: No edema.      Comments: RUE/RLE 2-3/5  LUE/LLE 4/5   Skin:     General: Skin is warm and dry.      Capillary Refill: Capillary refill takes less than 2 seconds.   Neurological:      Mental Status: She is easily aroused.      Cranial Nerves: Cranial nerve deficit and facial asymmetry present.      Motor: Weakness present.      Coordination: Finger-Nose-Finger Test abnormal.   Psychiatric:         Judgment: Judgment normal.      Comments: Mumbles words         Medications  Current Facility-Administered Medications   Medication Dose Route Frequency Provider Last Rate Last Admin    atorvastatin (Lipitor) tablet 80 mg  80 mg Oral Q EVENING Tatyana Delarosa, A.P.R.N.        hydrALAZINE (Apresoline) injection 10-20 mg  10-20 mg Intravenous Q4HRS PRN Tatyana Delarosa, A.P.R.N.   20 mg at 08/15/24 1030    morphine 4 MG/ML injection 2 mg  2 mg Intravenous Q2HRS PRN Tatyana Delarosa, A.P.R.N.   2 mg at 08/15/24 1217    labetalol (Normodyne/Trandate) injection 10-20 mg  10-20 mg Intravenous Q4HRS PRN Tatyana Delarosa, A.P.R.N.   20 mg at 08/15/24 1239    niCARdipine (Cardene) 25 mg in  mL Standard Infusion  0-15 mg/hr Intravenous Continuous Lex Pennington M.D. 25 mL/hr at 08/15/24 1321 2.5 mg/hr at 08/15/24 1321    Respiratory Therapy Consult   Nebulization Continuous RT Susana Morales        acetaminophen (Tylenol) tablet 650 mg  650 mg Oral Q6HRS PRN Susana Morales        senna-docusate (Pericolace Or Senokot S)  8.6-50 MG per tablet 2 Tablet  2 Tablet Oral Q EVENING Susana CHRISTY. Latona        And    polyethylene glycol/lytes (Miralax) Packet 1 Packet  1 Packet Oral QDAY PRN Susana LDottie Latona        ondansetron (Zofran) syringe/vial injection 4 mg  4 mg Intravenous Q4HRS PRN Ussana L. Latona   4 mg at 08/15/24 1315    ondansetron (Zofran ODT) dispertab 4 mg  4 mg Oral Q4HRS PRN Susana L. Latona        insulin regular (HumuLIN R,NovoLIN R) injection  2-9 Units Subcutaneous Q6HRS Susana L. Latona   2 Units at 08/15/24 1229    And    dextrose 50% (D50W) injection 25 g  25 g Intravenous Q15 MIN PRN Susana L. Latona        pantoprazole (Protonix) injection 40 mg  40 mg Intravenous BID Susana L. Latona   40 mg at 08/15/24 0528       Fluids    Intake/Output Summary (Last 24 hours) at 8/15/2024 1424  Last data filed at 8/15/2024 1000  Gross per 24 hour   Intake 572.19 ml   Output 1030 ml   Net -457.81 ml       Laboratory          Recent Labs     08/14/24  2253 08/15/24  0840 08/15/24  1105   SODIUM 135  --  135   POTASSIUM 2.8*  --  3.7   CHLORIDE 95*  --  96   CO2 22  --  23   BUN 76*  --  83*   CREATININE 2.77*  --  3.40*   MAGNESIUM 2.2 2.5  --    PHOSPHORUS  --  8.3*  --    CALCIUM 9.8  --  9.6     Recent Labs     08/14/24  1806 08/14/24  2253 08/15/24  1105   ALTSGPT  --  8  --    ASTSGOT  --  19  --    ALKPHOSPHAT  --  124*  --    TBILIRUBIN  --  0.2  --    GLUCOSE 129* 124* 146*     Recent Labs     08/14/24  1757 08/14/24  2253 08/15/24  0509   WBC  --   --  10.0   NEUTSPOLYS 63.2  --   --    LYMPHOCYTES 25.3  --   --    MONOCYTES 7.5  --   --    EOSINOPHILS 2.2  --   --    BASOPHILS 1.8*  --   --    ASTSGOT  --  19  --    ALTSGPT  --  8  --    ALKPHOSPHAT  --  124*  --    TBILIRUBIN  --  0.2  --      Recent Labs     08/14/24  1757 08/15/24  0216 08/15/24  0509   RBC 3.68*  --  3.30*   HEMOGLOBIN 11.2* 9.8* 9.9*   HEMATOCRIT 33.7* 31.0* 30.7*   PLATELETCT 373  --  354       Imaging  CT:    Reviewed    Assessment/Plan  * Acute ischemic  stroke (HCC)- (present on admission)  Assessment & Plan  -Pt presented to outside facility initially after sustaining a fall w/ head strike on plavix. There were reports of possible seizure like activity so she was medicated with Keppra. Staff then noted the pt had focal deficits and CT imaging showed L distal M1 occlusion. Pt was given TNK at 1915 then transferred to this facility for neurovascular intervention.    -Left MCA Occlusion noted on scans from [facility].  -s/p IR thrombectomy; TICI 2b  -serial neurologic exams  -Continuous telemetry  -statin. Resume DAPT pending MRI and Neurology recommendations  -goal normothermia, normonatremia, normoglycemia, euvolemia  -PT OT SLP  -Fall Precautions, Aspiration Precautions    BRBPR (bright red blood per rectum)  Assessment & Plan  -once overnight, none thus far today  -trending Hgb, stable    Electrolyte imbalance- (present on admission)  Assessment & Plan  -follow labs and replete/correct electrolytes as indicated    Hyperlipidemia- (present on admission)  Assessment & Plan  -statin    Contusion of middle front wall of thorax- (present on admission)  Assessment & Plan  -Noted prior to arrival  -CT scan unremarkable for internal bleeding  -Sukhdeep border of hematoma, monitor for expansion    Superficial occlusion of femoral artery (HCC)- (present on admission)  Assessment & Plan  -chronic s/p stent placement  -resume DAPT as able    Thyroid nodule- (present on admission)  Assessment & Plan  -incidental finding on imaging  -checking TSH    Anemia, chronic disease- (present on admission)  Assessment & Plan  -chronic and stable at baseline  -monitor for bleeding  -trend Hgb    Primary hypertension- (present on admission)  Assessment & Plan  -chronic  -SBP goal < 140  -PRN's available  -resume home meds as able    CKD (chronic kidney disease) stage 4, GFR 15-29 ml/min ([facility])- (present on admission)  Assessment & Plan  -chronic and stable at baseline  -avoid nephrotoxins  -renally  dose medications as indicated           VTE:  Contraindicated  Ulcer: Not Indicated  Lines: Phipps Catheter  Ongoing indication addressed    I have performed a physical exam and reviewed and updated ROS and Plan today (8/15/2024). In review of yesterday's note (8/14/2024), there are no changes except as documented above.     Discussed patient condition and risk of morbidity and/or mortality with RN, RT, Pharmacy, , Patient, neurology, and my attending Dr. Hudson.  The patient remains critically ill.  Critical care time = 47 minutes in directly providing and coordinating critical care and extensive data review.  No time overlap and excludes procedures.    Please note that this dictation was created using voice recognition software. I have made every reasonable attempt to correct obvious errors, but there may be errors of grammar and possibly content that I did not discover before finalizing the note.    KATHRINE Torre.

## 2024-08-15 NOTE — PROGRESS NOTES
Radiology Progress Note   Author: JOHN Vivas Date & Time created: 8/15/2024  9:19 AM   Date of admission  8/14/2024  Note to reader: this note follows the APSO format rather than the historical SOAP format. Assessment and plan located at the top of the note for ease of use.    Chief Complaint  79 y.o. female admitted 8/14/2024 with   Chief Complaint   Patient presents with    Possible Stroke     LKW 1645  2000 mg Keppra for seizure prophylaxis  TNK @ 1915         HPI  79-year-old female with past medical history significant for HTN, DM, CKD 4, prior CVA, and recent GI bleed transferred 08/14/2024 from an outside facility for CT head finding of left distal M1 occlusion after presenting to outside facility after a fall with possible head strike.  Patient was given TNK prior to transfer.  Upon arrival, patient was taken to the IR suite and underwent a left M1 mechanical thrombectomy with TICI 2B recanalization    Interval History:   08/15/2024 - No acute events overnight. Neuro exam improving. MRI pending. Right  groin access site soft, non-tender, without ecchymosis; dressing cdi.      Assessment/Plan     Principal Problem:    Acute ischemic stroke (HCC)  Active Problems:    CKD (chronic kidney disease) stage 4, GFR 15-29 ml/min (HCC)    Primary hypertension    Type 2 diabetes mellitus (HCC)    Anemia, chronic disease    Thyroid nodule    Superficial occlusion of femoral artery (HCC)    Contusion of middle front wall of thorax    Hyperlipidemia    Electrolyte imbalance      Plan IR  - S/P left M1 mechanical thrombectomy with TICI 2B  - -160 for TICI 2B  - Post access site instructions: no lifting greater than 5 lbs and no baths/swimming/soaking in tub for 5 days. Shower OK. OK to change dressings/band aid as needed.   - Neuro checks per ICU protocol  - Secondary stroke prevention per Neurology recommendations  - Pending CT/MRI   - Pending PT/OT/ Swallow eval  - No further interventions  planned from an NUPUR standpoint  - NUPUR signing off   -  -Thank you for allowing Interventional Radiology team to participate in the patients care, if any additional care or requests are needed in the future please do not hesitate to call or place IR order           Review of Systems  Physical Exam   Review of Systems   Constitutional:  Positive for malaise/fatigue. Negative for chills and fever.   Respiratory:  Negative for shortness of breath.    Cardiovascular:  Negative for chest pain and palpitations.   Gastrointestinal:  Negative for nausea and vomiting.   Neurological:  Positive for weakness. Negative for tingling, sensory change, speech change and headaches.      Vitals:    08/15/24 0645   BP: (!) 148/68   Pulse: 72   Resp: 15   Temp:    SpO2: 96%        Physical Exam  Constitutional:       General: She is not in acute distress.  Cardiovascular:      Rate and Rhythm: Normal rate.   Pulmonary:      Effort: Pulmonary effort is normal. No respiratory distress.   Abdominal:      Palpations: Abdomen is soft.   Skin:     General: Skin is warm and dry.      Comments: Right  groin access site soft, non-tender, without ecchymosis; dressing cdi.     Neurological:      General: No focal deficit present.      Mental Status: She is alert and oriented to person, place, and time.      Comments: Lethargic, arouses to voice briefly before drifting off again, follows simple commands, answers yes/no questions  Repeats words  A/O x 2  PEARLA  Mild right facial droop  Sustained antigravity without downward drift in LUE and LLE; Sustained antigravity with mild downward drift in RUE; brief antigravity with downward drift in LLE.    Psychiatric:         Mood and Affect: Mood normal.         Behavior: Behavior normal.             Labs    Recent Labs     08/14/24  1757 08/15/24  0216 08/15/24  0509   WBC  --   --  10.0   RBC 3.68*  --  3.30*   HEMOGLOBIN 11.2* 9.8* 9.9*   HEMATOCRIT 33.7* 31.0* 30.7*   MCV 91.7  --  93.0   MCH 30.4  --  " 30.0   MCHC 33.1  --  32.2   RDW 17.4*  --  54.4*   PLATELETCT 373  --  354   MPV 8.8  --  11.1     Recent Labs     08/14/24  1806 08/14/24  2253   SODIUM  --  135   POTASSIUM  --  2.8*   CHLORIDE  --  95*   CO2  --  22   GLUCOSE 129* 124*   BUN  --  76*   CREATININE  --  2.77*   CALCIUM  --  9.8     Recent Labs     08/14/24  2253   ALBUMIN 3.6   TBILIRUBIN 0.2   ALKPHOSPHAT 124*   TOTPROTEIN 6.2   ALTSGPT 8   ASTSGOT 19   CREATININE 2.77*     CT-CHEST,ABDOMEN,PELVIS WITH   Final Result         1.  Occlusion of left superficial femoral artery with stent in place.   2.  Bilateral indeterminate thyroid nodules, recommend follow-up thyroid sonography as clinically appropriate for further characterization due to nodule size.   3.  Gallbladder distention, additional workup as clinically appropriate.   4.  Atherosclerosis and atherosclerotic coronary artery disease      CT-HEAD W/O   Final Result         1.  No acute intracranial abnormality is identified, there are nonspecific white matter changes, commonly associated with small vessel ischemic disease.  Associated mild cerebral atrophy is noted.   2.  Atherosclerosis.               IR-THROMBO MECHANICAL ARTERY,INIT    (Results Pending)   MR-BRAIN-W/O    (Results Pending)   CT-HEAD W/O    (Results Pending)   EC-ECHOCARDIOGRAM COMPLETE W/O CONT    (Results Pending)     No results found for: \"INR\"  No results found for: \"POCINR\"     Intake/Output Summary (Last 24 hours) at 8/15/2024 0919  Last data filed at 8/15/2024 0600  Gross per 24 hour   Intake 477.02 ml   Output 1030 ml   Net -552.98 ml      I have personally reviewed the above labs and imaging      I have performed a physical exam and reviewed and updated ROS and Plan today (8/15/2024).     45 minutes in directly providing and coordinating care and extensive data review.  No time overlap and excludes procedures.   "

## 2024-08-15 NOTE — CARE PLAN
The patient is Watcher - Medium risk of patient condition declining or worsening    Shift Goals  Clinical Goals: SBP < 160, TNK/Thrombectomy protocol, improved neuro exam  Patient Goals: BULMARO  Family Goals: BULMARO    Progress made toward(s) clinical / shift goals:        Problem: Optimal Care of the Stroke Patient  Goal: Optimal acute care for the stroke patient  Outcome: Progressing     Problem: Neuro Status  Goal: Neuro status will remain stable or improve  Outcome: Progressing       Patient is not progressing towards the following goals:      Problem: Knowledge Deficit - Standard  Goal: Patient and family/care givers will demonstrate understanding of plan of care, disease process/condition, diagnostic tests and medications  Outcome: Not Progressing     Problem: Psychosocial - Patient Condition  Goal: Patient's ability to verbalize feelings about condition will improve  Outcome: Not Progressing     Problem: Hemodynamic Monitoring  Goal: Patient's hemodynamics, fluid balance and neurologic status will be stable or improve  Outcome: Not Progressing  Note: Patient required multiple doses of PRN antihypertensives, ultimately requiring nicardipine gtt to stabilize BP     Problem: Urinary Elimination  Goal: Establish and maintain regular urinary output  Outcome: Not Progressing  Note: Phipps placed due to urinary retention

## 2024-08-15 NOTE — DISCHARGE PLANNING
CM reviewed chart and Jaclyn was discussed in IDT rounds.    Sarah Shon Banks provided information for me.  Jaclyn does live with her .  They both use scooters.  They have concrete ramps so they can get out to the garden and water etc.    Jaclyn has been independent up until this event.  She provides care for her  in the home.      Care Transition Team Assessment    Information Source  Orientation Level: Oriented to person, Oriented to place, Disoriented to time, Disoriented to situation  Information Given By: Relative  Informant's Name: Sarah Menendez Darren  Who is responsible for making decisions for patient? : POA  Name(s) of Primary Decision Maker: Elizabeth Chritsopher    Readmission Evaluation  Is this a readmission?: No    Elopement Risk  Legal Hold: No  Ambulatory or Self Mobile in Wheelchair: No-Not an Elopement Risk  Elopement Risk: Not at Risk for Elopement    Discharge Preparedness  What is your plan after discharge?: Home with help  What are your discharge supports?: Child  Prior Functional Level: Ambulatory, Drives Self, Independent with Activities of Daily Living, Independent with Medication Management  Difficulity with ADLs: None  Difficulity with IADLs: None    Functional Assesment  Prior Functional Level: Ambulatory, Drives Self, Independent with Activities of Daily Living, Independent with Medication Management    Finances  Financial Barriers to Discharge: No  Prescription Coverage: Yes    Advance Directive  Advance Directive?: DPOA for Health Care  Durable Power of  Name and Contact : Elizabeth Christopher    Discharge Risks or Barriers  Discharge risks or barriers?: Post-acute placement / services  Patient risk factors: Cognitive / sensory / physical deficit, Lack of outside supports, Readmission, Vulnerable adult    Anticipated Discharge Information  Discharge Disposition: D/T to SNF with Medicare cert in anticipation of skilled care (03)

## 2024-08-15 NOTE — ASSESSMENT & PLAN NOTE
Nephrology following  Now on IHD via fistula   wants to continue dialysis - both I and nephrology have made him aware she is a poor long-term dialysis candidate    Strict I/Os  Renally dosed medications  Avoid nephrotoxic agents as able  Trend

## 2024-08-15 NOTE — ASSESSMENT & PLAN NOTE
- Obtain HgA1c  - SSI as ordered  - Carb controlled diet when tolerating PO  - Dietary/lifestyle modification counseling

## 2024-08-15 NOTE — PROGRESS NOTES
Cerebral angiogram with mechanical thrombectomy for M2 left MCA occlusion by Dr. Pennington.  Emergent consent. Pre-procedure pedal pulses per doppler. Right femoral artery access site. Pt placed on monitor, prepped and draped in a sterile fashion. Vital signs were taken every 5 minutes and remained within parameters (see doc flow sheets). Suction system was used to retrieve clot. Hemostasis achieved using AngioSeal deployed at 2128. Report given to Zaira LIAO. Pt was transported to ICU with RN and monitor to r117. Stroke worksheet review during warm handoff with Zaira. ICU responsible for serial checks starting at 2200. See Stroke Procedure flowsheet for VS, neuro, access, extremity serial assessments.    LKW:1645  NIH:10  TNK: given at 1911 at outside facility  Time in IR:2101  Access:2112  1st angio:2115   1st pass:2120  Closure (end time):2128      TICI score 2B  @ 2123    Post procedure pedal pulses doppler      AngioSeal [facility]  REF: [id number]  LOT:[id number]  EXP:2025-04-23

## 2024-08-15 NOTE — PROGRESS NOTES
Pharmacy Medication Reconciliation      ~Medication reconciliation updated and complete per patient home pharmacy and patient spouse over the phone  ~Allergies have been verified and updated per care everywhere   ~Patient home pharmacy :  [facility]    ~Per patient pharmacy patient received a 7 day course of Keflex on 7/31/2024        ~Anticoagulants (rivaroxaban, apixaban, edoxaban, dabigatran, warfarin, enoxaparin) taken in the last 14 days? No

## 2024-08-15 NOTE — CONSULTS
Neurology STROKE CODE H&P  Neurohospitalist Service, [facility]    Referring Physician: Felisa Jenkins D.O.    STROKE CODE:   Chief Complaint   Patient presents with    Possible Stroke     LKW 1645  2000 mg Keppra for seizure prophylaxis  TNK @ 1915       To obtain the most accurate data regarding the time called, and time patient seen, refer to the stroke run-sheet and chart.  For time of CT, refer to the radiology report. See A&P below for TPA Decision and door to needle time if and when applicable.    HPI: History obtained from chart only.      Patient was transferred in outside facility.    Patient presented to outside facility after a fall onto her mechanical wheelchair.  Other facility found a distal left M1 occlusion.  Last known well was 445 PM.  Outside facility gave TNK at 7:15 PM.  Per transfer notes NIH scale was 10 at their facility.  Patient appeared to be aphasic.  With left-sided weakness.    Past medical includes peripheral vascular disease, CAD, history of stroke on Plavix.  Chronic renal failure.    Review of systems: In addition to what is detailed in the HPI above, (and scanned into the chart if and when applicable), all other systems reviewed and are negative.    Past Medical History:    has no past medical history on file.    FHx:  family history is not on file.    SHx:       Allergies:  Allergies   Allergen Reactions    Ciprofloxacin Unspecified     Cant breathe    Diovan [Valsartan] Unspecified     Heart attack symptoms    Metformin Unspecified     Heart attack symtoms       Medications:    Current Facility-Administered Medications:     labetalol (Normodyne/Trandate) injection 10 mg, 10 mg, Intravenous, Once, Felisa Jenkins D.O.    potassium chloride (KCL) ivpb 10 mEq, 10 mEq, Intravenous, Once, Felisa Jenkins D.O.  No current outpatient medications on file.    Physical Examination:    Vitals:    08/14/24 2041   BP: (!) 196/88   Pulse: 72   Resp: 16   Temp: 36.3 °C (97.3  "°F)   TempSrc: Temporal   SpO2: 99%   Weight: 74.8 kg (165 lb)   Height: 1.651 m (5' 5\")           NEUROLOGICAL EXAM:       Patient is awake and alert.  She does not follow any commands.  Completely mute.  Global aphasic.  She has a left gaze preference.  Unable to cross midline.  With stimulus.  Pupils are equal reactive.  Blinks to threats on the left side less so on the right side.  Has a right facial weakness.  Decreased reaction to noxious stimuli on the right side of face compared to left  Startles to sounds.  Does not follow for shoulder shrug or tongue testing.  She has spontaneous antigravity movement in the left arm and leg.  Will move the right arm and leg to noxious stimuli but not antigravity strength.  Decreased reactivity to sensory on the right compared to left.  Toes are downgoing bilaterally.  No obvious ataxia.  Cannot assess gait due to clinical addition.  Regular heart rhythm.      NIH Stroke Scale:    1a. Level of Consciousness (Alert, drowsy, etc): 0= Alert    1b. LOC Questions (Month, age): 2= Incorrect    1c. LOC Commands (Open/close eyes make fist/let go): 2= Incorrect    2.   Best Gaze (Eyes open - patient follows examiner's finger on face): 2= Forced deviation    3.   Visual Fields (introduce visual stimulus/threat to patient's field quadrants): 1= Partial Hemiania  4.   Facial Paresis (Show teeth, raise eyebrows and squeeze eyes shut): 2 = Partial     5a. Motor Arm - Left (Elevate arm to 90 degrees if patient is sitting, 45 degrees if  supine): 0= No drift    5b. Motor Arm - Right (Elevate arm to 90 degrees if patient is sitting, 45 degrees if supine): 3= No effort against gravity    6a. Motor Leg - Left (Elevate leg 30 degrees with patient supine): 0= No drift    6b. Motor Leg - Right  (Elevate leg 30 degrees with patient supine): 3= No effort against gravity    7.   Limb Ataxia (Finger-nose, heel down shin): 0= No ataxia    8.   Sensory (Pin prick to face, arm, trunk and leg - compare " "side to side): 1= Partial loss    9.  Best Language (Name item, describe a picture and read sentences): 3= Mute    10. Dysarthria (Evaluate speech clarity by patient repeating listed words): 2= Near to unintelligible or worse    11. Extinction and Inattention (Use information from prior testing to identify neglect or  double simultaneous stimuli testing): 0= No neglect    Total NIH Score: 21    Modified [place] Scale (MRS): 0 = No symptoms      Objective Data:    Labs:  No results found for: \"PROTHROMBTM\", \"INR\"   Lab Results   Component Value Date/Time    RBC 3.68 (L) 08/14/2024 05:57 PM    HEMOGLOBIN 11.2 (L) 08/14/2024 05:57 PM    HEMATOCRIT 33.7 (L) 08/14/2024 05:57 PM    MCV 91.7 08/14/2024 05:57 PM    MCH 30.4 08/14/2024 05:57 PM    MCHC 33.1 08/14/2024 05:57 PM    MPV 8.8 08/14/2024 05:57 PM    NEUTSPOLYS 63.2 08/14/2024 05:57 PM    LYMPHOCYTES 25.3 08/14/2024 05:57 PM    MONOCYTES 7.5 08/14/2024 05:57 PM    EOSINOPHILS 2.2 08/14/2024 05:57 PM    BASOPHILS 1.8 (H) 08/14/2024 05:57 PM      Lab Results   Component Value Date/Time    SODIUM 139 10/25/2023 10:52 AM    POTASSIUM 3.6 10/25/2023 10:52 AM    CHLORIDE 102 10/25/2023 10:52 AM    CO2 25 10/25/2023 10:52 AM    GLUCOSE 129 (H) 08/14/2024 06:06 PM    GLUCOSE 132 (H) 10/25/2023 10:52 AM    BUN 66 (H) 10/25/2023 10:52 AM    CREATININE 2.58 (H) 10/25/2023 10:52 AM    GLOMRATE 18 (L) 10/25/2023 10:52 AM      No results found for: \"CHOLSTRLTOT\", \"LDL\", \"HDL\", \"TRIGLYCERIDE\"    Lab Results   Component Value Date/Time    ALKPHOSPHAT 115 09/05/2023 11:30 AM    ASTSGOT 21 09/05/2023 11:30 AM    ALTSGPT 14 09/05/2023 11:30 AM    TBILIRUBIN 0.5 09/05/2023 11:30 AM        Imaging/Testing:  Independent interpretation of outside CTA shows distal left M1 occlusion with reconstitution of the vessel.  No early signs of infarct seen on the plain CT head.    IR-THROMBO MECHANICAL ARTERY,INIT    (Results Pending)   CT-HEAD W/O    (Results Pending)   CT-CHEST,ABDOMEN,PELVIS " WITH    (Results Pending)         Assessment and Plan:    79-year-old female transferred from outside facility status post TNK.  Found to have a left distal M1 occlusion.  Patient will be taken to IR for potential thrombectomy.  Etiology of infarct unknown at this time.  Patient will be admitted to the ICU for post thrombolytic and thrombectomy protocol.    Plan:  1.  IR for potential thrombectomy  2.  MRI brain no need to wait for 24-hour mis.  3.  Post thrombolytic protocol nursing check protocol.  4.  Telemonitoring  5.  Check an A1c and lipid panel  6.  Maintain an LDL goal below 70 with statin  7.  Maintain normoglycemia  8.  TTE without bubble  9.  Blood pressure parameters will be dictated on a TICI score as below  10.  No antiplatelets or anticoagulation for the next 24 hours      After the endovascular intervention, please follow the following recommendations regarding blood pressure parameters:    If TICI 3/2c: maintain systolic BP < 140  If TICI 2b: maintain systolic BP < 160  If TICI 2a or less, maintain systolic BP < 180 per tPA protocol    This is in an effort to minimize reperfusion injury and/or hemorrhagic conversion      TICI score is 2B.  Keep the blood pressure below 160 systolic.      The evaluation of the patient, and recommended management, was discussed with the resident staff.     This chart was partially generated using voice recognition technology and sound alike word replacement may be present, best efforts were made to make the chart accurate.    Jp Seals MD  Board Certified Neurology, ABPN  t) [phone]

## 2024-08-15 NOTE — THERAPY
08/15/24 0743   Interdisciplinary Plan of Care Collaboration   Collaboration Comments OT consult received. Per chart, pt has strict bed rest orders until 8/15 at 2205. Will follow for appropriate timing of eval.

## 2024-08-15 NOTE — CARE PLAN
The patient is Watcher - Medium risk of patient condition declining or worsening    Shift Goals  Clinical Goals: -160, SLP, PT, OT  Patient Goals: BULMARO  Family Goals: Updates      Problem: Knowledge Deficit - Standard  Goal: Patient and family/care givers will demonstrate understanding of plan of care, disease process/condition, diagnostic tests and medications  Outcome: Progressing     Problem: Optimal Care of the Stroke Patient  Goal: Optimal acute care for the stroke patient  Outcome: Progressing     Problem: Knowledge Deficit - Stroke Education  Goal: Patient's knowledge of stroke and risk factors will improve  Outcome: Progressing     Problem: Neuro Status  Goal: Neuro status will remain stable or improve  Outcome: Progressing     Problem: Hemodynamic Monitoring  Goal: Patient's hemodynamics, fluid balance and neurologic status will be stable or improve  Outcome: Progressing     Problem: Mobility - Stroke  Goal: Patient's capacity to carry out activities will improve  Outcome: Progressing     Problem: Pain - Standard  Goal: Alleviation of pain or a reduction in pain to the patient’s comfort goal  Outcome: Progressing

## 2024-08-15 NOTE — PROGRESS NOTES
4 Eyes Skin Assessment Completed by KESHAWN Doe and KESHAWN Meneses.    Head Bruising and Redness, hematoma x2  Ears WDL  Nose Redness, blanching  Mouth Redness and Bleeding  Neck Bruising  Breast/Chest- multiple bruising, redness, blood blisters, skin tear  Shoulder Blades Left shoulder blade bruise  Spine WDL  (R) Arm/Elbow/Hand bruising, elbow bleeding, multiple skin tears upper and lower arm  (L) Arm/Elbow/Hand - fistula, bruising, redness, blood blisters  Abdomen Bruising, scar lower abdomen  Groin- R groin site CDI, soft  Scrotum/Coccyx/Buttocks - abrasion right buttocks  (R) Leg Right knee abrasion- bleeding, right groin incision CDI, right hip hematoma  (L) Leg Bruising  (R) Heel/Foot/Toe- redness below toes, dryness, heel   (L) Heel/Foot/Toe- heel red and blanching, dryness, scab second toe          Devices In Places ECG, Blood Pressure Cuff, Pulse Ox, SCD's, and Leg Immobilizer      Interventions In Place Heel Mepilex, Sacral Mepilex, Pillows, Q2 Turns, Low Air Loss Mattress, Dri-Parveen Pads, and Heels Loaded W/Pillows    Possible Skin Injury Yes    Pictures Uploaded Into Epic Yes  Wound Consult Placed Yes  RN Wound Prevention Protocol Ordered Yes

## 2024-08-15 NOTE — ED TRIAGE NOTES
"Chief Complaint   Patient presents with    Possible Stroke     LKW 1645  2000 mg Keppra for seizure prophylaxis  TNK @ 1915     Patient BIB [facility] as a transfer from [facility] for the above complaint. Patient had a fall at 1645 with head strike to right side of head as well as right arm. Bruising noted to right forehead, patient chest, and right arm.    Patient received TNK at 1915,40 mEq Potassium, & 2000 mg Keppra.    NIH at 1930 per sending facility 23. Initial NIH 10.    Patient  has a PMH of ESRD with left arm fistula, previous stroke, unknown deficits and  on Plavix, HTN, DM2.    Left MCA Occlusion noted on scans from [facility].    ERP and Neurologist at bedside on patient arrival.    BP (!) 196/88   Pulse 72   Temp 36.3 °C (97.3 °F) (Temporal)   Resp 16   Ht 1.651 m (5' 5\")   Wt 74.8 kg (165 lb)   SpO2 99%    "

## 2024-08-15 NOTE — H&P
Critical Care History & Physical Note    Date of Service  8/14/2024    Primary Care Physician  Efren Hernández M.D.    Consultants  neurology and Neuro-IR    Specialist Names: Dr. Seals,      Code Status  Full Code    Chief Complaint  Chief Complaint   Patient presents with    Possible Stroke     LKW 1645  2000 mg Keppra for seizure prophylaxis  TNK @ 1915       History of Presenting Illness  Jaclyn Goddard is a 79 y.o. female w/ PMHx s/f HTN, Prior CVA w/ unknown deficits, DM, CKD stage IV,  AOCD, and recent GIB (BRBPR) who initially was seen as a trauma at an outside facility after she fell while transferring from her scooter w/ a possible head strike. She reportedly had possible seizure like activity at the outside facility for which she was given keppra. She was then noted to have focal deficits and had an NIH of 23 at the outside facility. CT imaging of the head revealed a left distal M2 occlusion for which she was given TNK per neuro-tele recommendations at 1915. She was transferred from outside facility status post TNK for thrombectomy.  She is admitted to the ICU for Q1h neurological checks and strict blood pressure control following a successful thrombectomy which achieved TICI2b flow.    I discussed the plan of care with patient, bedside RN, and Dr. Seals, Dr. Pennington, & Dr. Lin .    Review of Systems  Review of Systems   Unable to perform ROS: Patient nonverbal       Past Medical History   has no past medical history on file.    Surgical History   has no past surgical history on file.     Family History  family history is not on file.   Family history reviewed with patient. There is no family history that is pertinent to the chief complaint.     Social History       Allergies  Allergies   Allergen Reactions    Ciprofloxacin Unspecified     Cant breathe    Diovan [Valsartan] Unspecified     Heart attack symptoms    Metformin Unspecified     Heart attack symtoms       Medications  None        Physical Exam  Temp:  [36 °C (96.8 °F)-36.3 °C (97.3 °F)] 36 °C (96.8 °F)  Pulse:  [66-72] 71  Resp:  [13-24] 22  BP: (123-196)/(59-88) 123/59  SpO2:  [92 %-100 %] 98 %  Blood Pressure : 123/59   Temperature: 36 °C (96.8 °F)   Pulse: 69   Respiration: 14   Pulse Oximetry: 99 %       Physical Exam  Constitutional:       General: She is not in acute distress.  HENT:      Head: Normocephalic.      Nose: Nose normal.      Mouth/Throat:      Mouth: Mucous membranes are dry.      Comments: Dried blood inside mouth  Eyes:      General: No scleral icterus.     Extraocular Movements: Extraocular movements intact.      Pupils: Pupils are equal, round, and reactive to light.   Cardiovascular:      Rate and Rhythm: Normal rate and regular rhythm.      Pulses: Normal pulses.      Heart sounds: No murmur heard.     Comments: + Bruit/Thrill to LFA  Pulmonary:      Effort: Pulmonary effort is normal. No respiratory distress.      Breath sounds: Normal breath sounds. No wheezing.   Abdominal:      General: Abdomen is flat. There is no distension.      Palpations: Abdomen is soft. There is no mass.      Tenderness: There is no abdominal tenderness. There is no guarding.   Genitourinary:     Comments: deferred  Musculoskeletal:      Cervical back: Normal range of motion and neck supple. No rigidity.      Right lower leg: Edema present.      Left lower leg: Edema present.   Skin:     General: Skin is warm and dry.      Capillary Refill: Capillary refill takes 2 to 3 seconds.      Findings: Bruising present.      Comments: Hematoma to mid sternum & left anterior shoulder   Neurological:      Mental Status: She is alert.      Motor: Weakness present.         Laboratory:  Recent Labs     08/14/24  1757   RBC 3.68*   HEMOGLOBIN 11.2*   HEMATOCRIT 33.7*   MCV 91.7   MCH 30.4   MCHC 33.1   RDW 17.4*   PLATELETCT 373   MPV 8.8     Recent Labs     08/14/24  1806   GLUCOSE 129*     Recent Labs     08/14/24  1806   GLUCOSE 129*         No  "results for input(s): \"NTPROBNP\" in the last 72 hours.      No results for input(s): \"TROPONINT\" in the last 72 hours.    Imaging:  CT-CHEST,ABDOMEN,PELVIS WITH   Final Result         1.  Occlusion of left superficial femoral artery with stent in place.   2.  Bilateral indeterminate thyroid nodules, recommend follow-up thyroid sonography as clinically appropriate for further characterization due to nodule size.   3.  Gallbladder distention, additional workup as clinically appropriate.   4.  Atherosclerosis and atherosclerotic coronary artery disease      CT-HEAD W/O   Final Result         1.  No acute intracranial abnormality is identified, there are nonspecific white matter changes, commonly associated with small vessel ischemic disease.  Associated mild cerebral atrophy is noted.   2.  Atherosclerosis.               IR-THROMBO MECHANICAL ARTERY,INIT    (Results Pending)   MR-BRAIN-W/O    (Results Pending)   CT-HEAD W/O    (Results Pending)   EC-ECHOCARDIOGRAM COMPLETE W/O CONT    (Results Pending)       X-Ray:  no CXR; CT chest unremarkable other than hematoma near sternum  EKG:  pending    Assessment/Plan:  * Acute ischemic stroke (HCC)- (present on admission)  Assessment & Plan  Pt presented to outside facility initially after sustaining a fall w/ head strike on plavix. There were reports of possible seizure like activity so she was medicated with Keppra. Staff then noted the pt had focal deficits and CT imaging showed L distal M1 occlusion. Pt was given TNK at 1915 then transferred to this facility for neurovascular intervention.    Left MCA Occlusion noted on scans from [facility].  - patient is s/p IR thrombectomy; TICI 2b achieved  - MRI brain no need to wait for 24-hour mis.  - Q1h neuro checks; groin checks per protocol  - Continuous telemetry  - Check an A1c and lipid panel  - Start statin therapy to maintain an LDL goal below 70    - Maintain normoglycemia  - TTE without bubble  - SBP <160mmHg s/p TICI 2b  - " No antiplatelets or anticoagulation for the next 24 hours  - Repeat CT head if unable to obtain MRI     Contusion of middle front wall of thorax- (present on admission)  Assessment & Plan  Noted prior to arrival  - CT scan unremarkable for internal bleeding  - Sukhdeep border of hematoma, monitor for expansion    Type 2 diabetes mellitus (HCC)- (present on admission)  Assessment & Plan  - Obtain HgA1c  - SSI as ordered  - Carb controlled diet when tolerating PO  - Dietary/lifestyle modification counseling    Primary hypertension- (present on admission)  Assessment & Plan  - SBP <160mmHg goal   - Resume home antihypertensives/diuretics when appropriate  - Cardene & PRN antihypertensives ordered  - Monitor I&O  - Cardiac diet when appropriate  - Control pain    CKD (chronic kidney disease) stage 4, GFR 15-29 ml/min ([facility])- (present on admission)  Assessment & Plan  CKD, stage 4  - Trend chemistry, renal function  - Correct electrolyte imbalances  - Strict I&O  - Avoid nephrotoxic drugs  - Consider nephrology consultation, if appropriate      Anemia, chronic disease- (present on admission)  Assessment & Plan  - Trend CBC  - Transfuse for Hg <7  - Monitor for S&S of bleeding; monitor progression of hematoma s/p TNK   - CT imaging unremarkable for bleeding internally    Superficial occlusion of femoral artery (HCC)- (present on admission)  Assessment & Plan  Left superficial femoral artery with stent in place noted on CT  - Pt on plavix as outpatient    Thyroid nodule- (present on admission)  Assessment & Plan  Noted  - Check thyroid panel  - Follow up outpatient        VTE prophylaxis: SCDs/TEDs and pharmacologic prophylaxis contraindicated due to s/p TNK    Patient was critically ill during the time I treated the patient.  75 minutes of critical care time were spent, including examination and interview of the patient, explanation of prognosis/diagnosis/plan of care, direction of nursing staff and discussion of the patient  with other physicians involved.  This time was independent of procedures performed.    Greater than 50% of which was spent at the bedside.

## 2024-08-15 NOTE — OR SURGEON
Immediate Post- Operative Note        Findings: Left M1 thrombus      Procedure(s): Mechanical thrombectomy     TICI 2b      Estimated Blood Loss: Less than 5 ml        Complications: None            8/14/2024     9:31 PM     Lex Pennington M.D.

## 2024-08-15 NOTE — ED PROVIDER NOTES
"ED Provider Note    CHIEF COMPLAINT  Chief Complaint   Patient presents with    Possible Stroke     LKW 1645  2000 mg Keppra for seizure prophylaxis  TNK @ 1915       EXTERNAL RECORDS REVIEWED  External ED Note [facility] ED note from earlier today    HPI/ROS  LIMITATION TO HISTORY   Select: : None  OUTSIDE HISTORIAN(S):  EMS      Jaclyn Goddard is a 79 y.o. female who presents to the emergency department for evaluation of a stroke.  Apparently the patient fell out of her chair at 1645.  She was initially evaluated at [facility] where there was concern for possible seizures initially.  She was given 2 g of Keppra.  She underwent a head CT and CTA which revealed an M2 occlusion on the left.  She was given TNK at 1715 and transferred here for IR intervention.  She is currently on Plavix.  No other additional imaging was performed.    PAST MEDICAL HISTORY       SURGICAL HISTORY  patient denies any surgical history    FAMILY HISTORY  No family history on file.    SOCIAL HISTORY  Social History     Tobacco Use    Smoking status: Not on file    Smokeless tobacco: Not on file   Substance and Sexual Activity    Alcohol use: Not on file    Drug use: Not on file    Sexual activity: Not on file       CURRENT MEDICATIONS  Home Medications       Reviewed by Nathaniel Person R.N. (Registered Nurse) on 08/14/24 at 2054  Med List Status: <None>     Medication Last Dose Status        Patient Miguel A Taking any Medications                         Audit from Redirected Encounters    **Home medications have not yet been reviewed for this encounter**         ALLERGIES  Allergies   Allergen Reactions    Ciprofloxacin Unspecified     Cant breathe    Diovan [Valsartan] Unspecified     Heart attack symptoms    Metformin Unspecified     Heart attack symtoms       PHYSICAL EXAM  VITAL SIGNS: /59   Pulse 69   Temp 36 °C (96.8 °F) (Temporal)   Resp 14   Ht 1.651 m (5' 5\")   Wt 74.8 kg (165 lb)   SpO2 99%   BMI 27.46 kg/m² "   Constitutional: Alert and in no apparent distress.  She has obvious trauma to her head and chest.  HENT: Normocephalic atraumatic. Bilateral external ears normal. Nose normal. Mucous membranes are moist.  Eyes: Pupils are equal and reactive. Conjunctiva normal. Non-icteric sclera.   Neck: Normal range of motion without tenderness. Supple. No midline cervical spine tenderness.  Cardiovascular: Regular rate and rhythm. No murmurs, gallops or rubs.  Thorax & Lungs: No increased work of breathing. Breath sounds are clear to auscultation bilaterally. No wheezing, rhonchi or rales.  There is a large boggy hematoma over the anterior chest wall.  Abdomen: Soft, nontender and nondistended.   Skin: Warm and dry. No rashes are noted.  The patient has multiple areas of ecchymosis on her head, face and chest  Extremities: 2+ peripheral pulses.  Musculoskeletal: Good range of motion in all major joints. No tenderness to palpation or major deformities noted.   Neurologic: Alert and aphasic.  She has right-sided deficits.  See NIH stroke scale below.    RADIOLOGY/PROCEDURES   I have independently interpreted the diagnostic imaging associated with this visit and am waiting the final reading from the radiologist.   My preliminary interpretation is as follows: No obvious intracranial hemorrhage.    Radiologist interpretation:  CT-CHEST,ABDOMEN,PELVIS WITH   Final Result         1.  Occlusion of left superficial femoral artery with stent in place.   2.  Bilateral indeterminate thyroid nodules, recommend follow-up thyroid sonography as clinically appropriate for further characterization due to nodule size.   3.  Gallbladder distention, additional workup as clinically appropriate.   4.  Atherosclerosis and atherosclerotic coronary artery disease      CT-HEAD W/O   Final Result         1.  No acute intracranial abnormality is identified, there are nonspecific white matter changes, commonly associated with small vessel ischemic disease.   Associated mild cerebral atrophy is noted.   2.  Atherosclerosis.               IR-THROMBO MECHANICAL ARTERY,INIT    (Results Pending)   MR-BRAIN-W/O    (Results Pending)   CT-HEAD W/O    (Results Pending)   EC-ECHOCARDIOGRAM COMPLETE W/O CONT    (Results Pending)       COURSE & MEDICAL DECISION MAKING    ASSESSMENT, COURSE AND PLAN  Care Narrative: This is a 79-year-old female presenting to the emergency department for evaluation of a stroke.  The patient was evaluated at triage with thomas Rosa, and noted to be completely aphasic and have right-sided deficits.  She was also noted to have significant anterior chest wall trauma with a large hematoma.  I did review her workup from the outside facility which revealed a potassium of 2.4.  She continued to get IV supplementation.  The plan was made to obtain a CT of the chest, abdomen and pelvis as there has been no other imaging performed at this point.    A chest wall contusion was noted but no active bleeding or intra-abdominal or intrathoracic injury was noted.  CT of the head was repeated per neurology recommendation and no evidence of intracranial hemorrhage was noted.  The plan was made to send the patient to interventional radiology for thrombectomy.    9:30 PM - I discussed the case with Carmen Meza Dr. Denney's nurse practitioner.  Dr. Lin will be accepting the patient to the ICU.    STROKE:   Time seen 20:45 (Time)     National Institutes of Health (NIH) Stroke Scale   NIH Stroke Scale    Level of Consciousness: 0   Ask Month and Age:2    Blink Eyes and Squeeze Hands: 2   Best Gaze:2    Visual: 1   Facial Palsy:2    Motor, Left Arm: 0   Motor, Right Arm:3    Motor, Left Le   Motor, Right Leg: 3   Limb Ataxia:0    Sensory Loss:1    Best Language:3    Dysarthria: 2   Extinction and Inattention: 0     NIHSS Score:21      No, this patient is not a candidate for thrombolytic therapy because she already received TNK at the outside  facility    CRITICAL CARE  The very real possibilty of a deterioration of this patient's condition required the highest level of my preparedness for sudden, emergent intervention.  I provided critical care services, which included medication orders, frequent reevaluations of the patient's condition and response to treatment, ordering and reviewing test results, and discussing the case with various consultants.  The critical care time associated with the care of the patient was 35 minutes. Review chart for interventions. This time is exclusive of any other billable procedures.     ADDITIONAL PROBLEMS MANAGED  CVA, contusion of the left chest wall, stroke, chronic kidney disease, thyroid nodule, hypokalemia    DISPOSITION AND DISCUSSIONS  I have discussed management of the patient with the following physicians and REBECA's:  Dr Mellisa Almazan    FINAL IMPRESSION  1. Cerebrovascular accident (CVA) due to occlusion of left middle cerebral artery (HCC)    2. Contusion of left chest wall, initial encounter    3. Acute ischemic stroke (HCC)    4. CKD (chronic kidney disease) stage 4, GFR 15-29 ml/min (HCC)    5. Primary hypertension    6. Anemia, chronic disease    7. Thyroid nodule      -ADMIT-    Electronically signed by: Felisa Jenkins D.O., 8/14/2024 8:51 PM

## 2024-08-15 NOTE — ASSESSMENT & PLAN NOTE
TICI 2B from initial thrombectomy  However she has had multiple smaller strokes since that time  She continues to shower clot likely from her mural thrombus    Her outlook appears to be exceedingly poor  Will discuss hospice with family today

## 2024-08-16 PROBLEM — I74.10 AORTIC MURAL THROMBUS (HCC): Status: ACTIVE | Noted: 2024-01-01

## 2024-08-16 NOTE — HOSPITAL COURSE
8/16 - acute neuro change around 1700. Repeat imaging showed mural thrombus in the arch extending into the left carotid. Heparin drip. SBP goal 130-160's.  MRI in the am shows new infarcts which are small and scattered.      8/17 - more neuro changes over night, very poor prognosis.  Will discuss hospice with . --> he is not agreeable, wants to keep her DNR/DNI but continue otherwise aggressive care.    8/18 - 1u blood, palliative consult.  IHD yesterday via fistula, went well.  Ongoing GOC.  Still recommending hospice.

## 2024-08-16 NOTE — PROGRESS NOTES
"Critical Care Progress Note    Date of admission  8/14/2024    Chief Complaint  Stroke    Hospital Course  Jaclyn Goddard is a 79-year-old female with PMH significant for DM2, HTN, CKD 4, chronic anemia, CVA with unknown residual deficits, and recent GIB who transferred from [facility] as a stroke as well as possible seizure-like activity.  She presented to OSH via EMS after sustaining a fall from her scooter, striking the right side of her head.  Initial NIH 23.  She received TNKase at 1915 with repeat NIH 10. CT head showed left M1 thrombus.  She transferred here and went directly to IR for mechanical thrombectomy with TICI 2B flow and was admitted to the ICU postprocedure.   She had a groin bleed as well as one episode of BRBPR. Hgb stable. Groin without hematoma.    8/15 - acute neuro change around 1700. Repeat imaging showed mural thrombus in the arch extending into the left carotid. Heparin drip. SBP goal 140-160's.    Interval Problem Update  Reviewed last 24 hour events:  Hgb: 9.9 - 9.5 - 9.0. No further BRBPR.   Tmax 97.9  SR 70-80's  SBP's 140-170's. Actively titrating Levophed drip  Neuro: minimal verbal interactions, intermittently follows. Moving left spontaneously. RUE 1-2/5, RLE 2-3/5  RASS -1 to +1, no sedation  NPO pending SLP eval  Heparin drip (no bolus protocol given recent BRBPR).  Phipps, PIV x 2, Fistula (+) thrill (+) bruit  I/O: 830/135  Mobility 2    Nephrology - Dr. Reyez - consulted.    Review of Systems  Review of Systems   Reason unable to perform ROS: Limited due to medical condition. Nods \"yes/no\" to simple questions.        Vital Signs for last 24 hours   Temp:  [36.1 °C (97 °F)-36.7 °C (98 °F)] 36.7 °C (98 °F)  Pulse:  [72-90] 77  Resp:  [8-39] 16  BP: ()/(35-93) 136/93  SpO2:  [92 %-99 %] 93 %    Hemodynamic parameters for last 24 hours       Respiratory Information for the last 24 hours       Physical Exam   Physical Exam  Vitals and nursing note reviewed. "   Constitutional:       General: She is sleeping. She is not in acute distress.     Appearance: She is ill-appearing. She is not toxic-appearing.      Interventions: Nasal cannula in place.   HENT:      Head: Normocephalic.      Nose: Nose normal.      Mouth/Throat:      Lips: Pink.      Mouth: Mucous membranes are moist.   Eyes:      Pupils: Pupils are equal, round, and reactive to light.   Cardiovascular:      Rate and Rhythm: Normal rate and regular rhythm.      Pulses: Normal pulses.      Heart sounds: Normal heart sounds.   Pulmonary:      Effort: Pulmonary effort is normal.      Breath sounds: Normal breath sounds. No wheezing or rhonchi.   Abdominal:      General: Bowel sounds are normal.      Palpations: Abdomen is soft.   Musculoskeletal:      Right lower leg: No edema.      Left lower leg: No edema.      Comments: RUE/RLE 2-3/5  LUE/LLE 4/5   Skin:     General: Skin is warm and dry.      Capillary Refill: Capillary refill takes less than 2 seconds.   Neurological:      Mental Status: She is easily aroused.      GCS: GCS eye subscore is 4. GCS verbal subscore is 2. GCS motor subscore is 6.      Cranial Nerves: Cranial nerve deficit and facial asymmetry present.      Motor: Weakness present.      Coordination: Finger-Nose-Finger Test abnormal.   Psychiatric:         Speech: She is noncommunicative.         Judgment: Judgment normal.         Medications  Current Facility-Administered Medications   Medication Dose Route Frequency Provider Last Rate Last Admin    levETIRAcetam (Keppra) injection 1,000 mg  1,000 mg Intravenous Q12HRS Jp Seals M.D.   1,000 mg at 08/16/24 0105    midazolam (Versed) injection 0.5 mg  0.5 mg Intravenous Q10 MIN PRN Tatyana Delarosa, A.P.R.N.        atorvastatin (Lipitor) tablet 80 mg  80 mg Oral Q EVENING FRANCIS Torre.P.R.N.        morphine 4 MG/ML injection 2 mg  2 mg Intravenous Q2HRS PRN Tatyana Delarosa A.P.R.N.   2 mg at 08/15/24 1217    heparin infusion 25,000 units  in 500 mL 0.45% NACL  0-30 Units/kg/hr (Adjusted) Intravenous Continuous Susana L. Latona 17.5 mL/hr at 08/16/24 1212 12 Units/kg/hr at 08/16/24 1212    labetalol (Normodyne/Trandate) injection 10-20 mg  10-20 mg Intravenous Q4HRS PRN Susana L. Latona   10 mg at 08/16/24 1020    hydrALAZINE (Apresoline) injection 10-20 mg  10-20 mg Intravenous Q4HRS PRN Susana L. Latona   10 mg at 08/16/24 0854    norepinephrine (Levophed) 8 mg in 250 mL NS infusion (premix)  0-1 mcg/kg/min (Ideal) Intravenous Continuous Susana L. Latona   Stopped at 08/16/24 0710    niCARdipine (Cardene) 25 mg in  mL Standard Infusion  0-15 mg/hr Intravenous Continuous Lex Pennington M.D. 50 mL/hr at 08/16/24 1209 5 mg/hr at 08/16/24 1209    Respiratory Therapy Consult   Nebulization Continuous RT Susana L. Latona        acetaminophen (Tylenol) tablet 650 mg  650 mg Oral Q6HRS PRN Susana L. Latona        senna-docusate (Pericolace Or Senokot S) 8.6-50 MG per tablet 2 Tablet  2 Tablet Oral Q EVENING Susana L. Latona        And    polyethylene glycol/lytes (Miralax) Packet 1 Packet  1 Packet Oral QDAY PRN Susana L. Latona        ondansetron (Zofran) syringe/vial injection 4 mg  4 mg Intravenous Q4HRS PRN Susana L. Latona   4 mg at 08/15/24 1315    ondansetron (Zofran ODT) dispertab 4 mg  4 mg Oral Q4HRS PRN Susana L. Latona        insulin regular (HumuLIN R,NovoLIN R) injection  2-9 Units Subcutaneous Q6HRS Susana L. Latona   2 Units at 08/15/24 1229    And    dextrose 50% (D50W) injection 25 g  25 g Intravenous Q15 MIN PRN Susana L. Latona           Fluids    Intake/Output Summary (Last 24 hours) at 8/16/2024 1221  Last data filed at 8/16/2024 1131  Gross per 24 hour   Intake 814.88 ml   Output 125 ml   Net 689.88 ml       Laboratory          Recent Labs     08/14/24  2253 08/15/24  0840 08/15/24  1105 08/16/24  0355   SODIUM 135  --  135 138   POTASSIUM 2.8*  --  3.7 3.8   CHLORIDE 95*  --  96 96   CO2 22  --  23 22   BUN 76*  --  83* 84*    CREATININE 2.77*  --  3.40* 4.25*   MAGNESIUM 2.2 2.5  --   --    PHOSPHORUS  --  8.3*  --   --    CALCIUM 9.8  --  9.6 9.2     Recent Labs     08/14/24  2253 08/15/24  1105 08/16/24  0355   ALTSGPT 8  --   --    ASTSGOT 19  --   --    ALKPHOSPHAT 124*  --   --    TBILIRUBIN 0.2  --   --    GLUCOSE 124* 146* 146*     Recent Labs     08/14/24  1757 08/14/24  2253 08/15/24  0509 08/16/24  0355   WBC  --   --  10.0 13.3*   NEUTSPOLYS 63.2  --   --   --    LYMPHOCYTES 25.3  --   --   --    MONOCYTES 7.5  --   --   --    EOSINOPHILS 2.2  --   --   --    BASOPHILS 1.8*  --   --   --    ASTSGOT  --  19  --   --    ALTSGPT  --  8  --   --    ALKPHOSPHAT  --  124*  --   --    TBILIRUBIN  --  0.2  --   --      Recent Labs     08/14/24  1757 08/15/24  0216 08/15/24  0509 08/15/24  1417 08/15/24  2135 08/16/24  0355   RBC 3.68*  --  3.30*  --   --  3.02*   HEMOGLOBIN 11.2*   < > 9.9* 9.5*  --  9.0*   HEMATOCRIT 33.7*   < > 30.7* 28.9*  --  28.3*   PLATELETCT 373  --  354  --   --  401   PROTHROMBTM  --   --   --   --  13.7  --    APTT  --   --   --   --  26.9  --    INR  --   --   --   --  1.04  --     < > = values in this interval not displayed.       Imaging  MRI:   Reviewed    Assessment/Plan  * Acute ischemic stroke (HCC)- (present on admission)  Assessment & Plan  -Pt presented to outside facility initially after sustaining a fall w/ head strike on plavix. There were reports of possible seizure like activity so she was medicated with Keppra. Staff then noted the pt had focal deficits and CT imaging showed L distal M1 occlusion. Pt was given TNK at 1915 then transferred to this facility for neurovascular intervention.    -Left MCA Occlusion noted on scans from [facility].  -s/p IR thrombectomy; TICI 2b  -serial neurologic exams  -Continuous telemetry  -statin. Resume DAPT pending MRI and Neurology recommendations  -goal normothermia, normonatremia, normoglycemia, euvolemia  -PT OT SLP  -Fall Precautions, Aspiration  Precautions  -brain MRI 8/15: A few acute small infarcts involving the left basal ganglia and posterior left parietal cortex     Aortic mural thrombus (HCC)  Assessment & Plan  -CT neck showed mural thrombus in the aorta which extends into the proximal left common carotid and subclavian arteries   -Heparin drip    BRBPR (bright red blood per rectum)  Assessment & Plan  -once overnight, since  -Hgb stable    Electrolyte imbalance- (present on admission)  Assessment & Plan  -follow labs and replete/correct electrolytes as indicated    Hyperlipidemia- (present on admission)  Assessment & Plan  -statin    Contusion of middle front wall of thorax- (present on admission)  Assessment & Plan  -Noted prior to arrival  -CT scan unremarkable for internal bleeding  -Sukhdeep border of hematoma, monitor for expansion    Superficial occlusion of femoral artery (HCC)- (present on admission)  Assessment & Plan  -chronic s/p stent placement  -resume DAPT as able    Thyroid nodule- (present on admission)  Assessment & Plan  -incidental finding on imaging  -normal TSH  -OP follow-up    Anemia, chronic disease- (present on admission)  Assessment & Plan  -chronic and stable at baseline  -monitor for bleeding  -trend Hgb    Primary hypertension- (present on admission)  Assessment & Plan  -chronic  -SBP goal < 140  -PRN's available  -resume home meds as able    CKD (chronic kidney disease) stage 4, GFR 15-29 ml/min ([facility])- (present on admission)  Assessment & Plan  -chronic and stable at baseline  -avoid nephrotoxins  -renally dose medications as indicated  -Nephrology consulted today. No HD today, re-evaluate tomorrow           VTE:  Heparin  Ulcer: Not Indicated  Lines: Arterial Line  Ongoing indication addressed and Phipps Catheter  Ongoing indication addressed    I have performed a physical exam and reviewed and updated ROS and Plan today (8/16/2024). In review of yesterday's note (8/15/2024), there are no changes except as documented  above.     Discussed patient condition and risk of morbidity and/or mortality with RN, RT, Pharmacy, , Patient, neurology, and my attending Dr. Hudson  The patient remains critically ill.  Critical care time = 46 minutes in directly providing and coordinating critical care and extensive data review.  No time overlap and excludes procedures.    Please note that this dictation was created using voice recognition software. I have made every reasonable attempt to correct obvious errors, but there may be errors of grammar and possibly content that I did not discover before finalizing the note.    KATHRINE Torre.

## 2024-08-16 NOTE — PROGRESS NOTES
Neurology Progress Note  Neurohospitalist Service, [facility]    Referring Physician: Júnior Hudson M.D.    Chief Complaint   Patient presents with    Possible Stroke     LKW 1645  2000 mg Keppra for seizure prophylaxis  TNK @ 1915       HPI: Refer to initial documented Neurology H&P, as detailed in the patient's chart.    Interval History 8/16: Per nursing patient had fluctuating exam all day yesterday.  However later in afternoon she had severe weakness of the right upper.  I repeated a CT head and neck which showed a mural thrombus in the aortic arch.  Started heparin.  Patient is quite different compared to my exam yesterday morning she was not communicative at all.  Therefore ordered EEG concern for seizures showed some focal slowing the left is unclear significance but I started Keppra.  given the multivessel stenosis intracranially we raised the pressures blood pressures up to 140s to 150s.        Review of systems: In addition to what is detailed in the HPI and/or updated in the interval history, all other systems reviewed and are negative.    Past Medical History:    has no past medical history on file.    FHx:  family history is not on file.    SHx:       Medications:    Current Facility-Administered Medications:     levETIRAcetam (Keppra) injection 1,000 mg, 1,000 mg, Intravenous, Q12HRS, Jp Seals M.D., 1,000 mg at 08/16/24 0105    midazolam (Versed) injection 0.5 mg, 0.5 mg, Intravenous, Q10 MIN PRN, Tatyana Delarosa, A.P.R.N.    atorvastatin (Lipitor) tablet 80 mg, 80 mg, Oral, Q EVENING, Tatyana Delarosa, A.P.R.N.    morphine 4 MG/ML injection 2 mg, 2 mg, Intravenous, Q2HRS PRN, Tatyana Delarosa A.P.R.N., 2 mg at 08/15/24 1217    heparin infusion 25,000 units in 500 mL 0.45% NACL, 0-30 Units/kg/hr (Adjusted), Intravenous, Continuous, Susana Morales, Last Rate: 17.5 mL/hr at 08/16/24 0443, 12 Units/kg/hr at 08/16/24 0443    labetalol (Normodyne/Trandate) injection 10-20 mg,  10-20 mg, Intravenous, Q4HRS PRN, Susana Morales    hydrALAZINE (Apresoline) injection 10-20 mg, 10-20 mg, Intravenous, Q4HRS PRN, Susana Morales    norepinephrine (Levophed) 8 mg in 250 mL NS infusion (premix), 0-1 mcg/kg/min (Ideal), Intravenous, Continuous, Susana Morales, Last Rate: 0 mL/hr at 08/16/24 0745, 0 mcg/kg/min at 08/16/24 0745    niCARdipine (Cardene) 25 mg in  mL Standard Infusion, 0-15 mg/hr, Intravenous, Continuous, Lex Pennington M.D., Stopped at 08/15/24 1437    Respiratory Therapy Consult, , Nebulization, Continuous RT, Susana Morales    acetaminophen (Tylenol) tablet 650 mg, 650 mg, Oral, Q6HRS PRN, Susana Morales    senna-docusate (Pericolace Or Senokot S) 8.6-50 MG per tablet 2 Tablet, 2 Tablet, Oral, Q EVENING **AND** polyethylene glycol/lytes (Miralax) Packet 1 Packet, 1 Packet, Oral, QDAY PRN, Susana Morales    ondansetron (Zofran) syringe/vial injection 4 mg, 4 mg, Intravenous, Q4HRS PRN, Susana Morales, 4 mg at 08/15/24 1315    ondansetron (Zofran ODT) dispertab 4 mg, 4 mg, Oral, Q4HRS PRN, Susana Morales    insulin regular (HumuLIN R,NovoLIN R) injection, 2-9 Units, Subcutaneous, Q6HRS, 2 Units at 08/15/24 1229 **AND** POC blood glucose manual result, , , Q6H **AND** NOTIFY MD and PharmD, , , Once **AND** Administer 20 grams of glucose (approximately 8 ounces of fruit juice) every 15 minutes PRN FSBG less than 70 mg/dL, , , PRN **AND** dextrose 50% (D50W) injection 25 g, 25 g, Intravenous, Q15 MIN PRN, Susana Morales    Physical Examination:     Vitals:    08/16/24 0545 08/16/24 0600 08/16/24 0615 08/16/24 0710   BP: (!) 168/68 (!) 177/75  (!) 194/84   Pulse: 74 75 78    Resp: 14 18 20    Temp:       TempSrc:       SpO2: 98% 99% 98%    Weight:       Height:             NEUROLOGICAL EXAM:         Patient is awake and alert however noncommunicative.  Follow commands intermittently only.  Mute.  Pupils equal reactive.  There is no gaze preference.  She will track in the  room left and right.  Right facial droop.  Sustained antigravity in the left arm and leg.  Drift in the right lower.  Minimal movement in the right upper.  Decrease reaction to noxious stimuli in the right upper compared to left.  No obvious signs ataxia.       NIH Stroke Scale:    1a. Level of Consciousness:  0    1b. LOC Questions (Month, age):  2    1c. LOC Commands (Open/close eyes make fist/let go):  1    2.   Best Gaze (Eyes open - patient follows examiner's finger on face):  0  3.   Visual Fields (introduce visual stimulus/threat to patient's field quadrants):   0  4.   Facial Paresis (Show teeth, raise eyebrows and squeeze eyes shut):  1  5a. Motor Arm - Left (Elevate arm to 90 degrees if patient is sitting, 45 degrees if  Supine): 0   5b. Motor Arm - Right (Elevate arm to 90 degrees if patient is sitting, 45 degrees if supine): 3  6a. Motor Leg - Left (Elevate leg 30 degrees with patient supine):  0  6b. Motor Leg - Right  (Elevate leg 30 degrees with patient supine):  1  7.   Limb Ataxia (Finger-nose, heel down shin):  0    8.   Sensory (Pin prick to face, arm, trunk and leg - compare side to side):  0  9.  Best Language (Name item, describe a picture and read sentences):  3  10. Dysarthria (Evaluate speech clarity by patient repeating listed words):  2  11. Extinction and Inattention (Use information from prior testing to identify neglect or double simultaneous stimuli testing): 0    Total NIH Score:  13                  Objective Data:    Labs:  Lab Results   Component Value Date/Time    PROTHROMBTM 13.7 08/15/2024 09:35 PM    INR 1.04 08/15/2024 09:35 PM      Lab Results   Component Value Date/Time    WBC 13.3 (H) 08/16/2024 03:55 AM    RBC 3.02 (L) 08/16/2024 03:55 AM    HEMOGLOBIN 9.0 (L) 08/16/2024 03:55 AM    HEMATOCRIT 28.3 (L) 08/16/2024 03:55 AM    MCV 93.7 08/16/2024 03:55 AM    MCH 29.8 08/16/2024 03:55 AM    MCHC 31.8 (L) 08/16/2024 03:55 AM    MPV 11.2 08/16/2024 03:55 AM    NEUTSPOLYS 63.2  08/14/2024 05:57 PM    LYMPHOCYTES 25.3 08/14/2024 05:57 PM    MONOCYTES 7.5 08/14/2024 05:57 PM    EOSINOPHILS 2.2 08/14/2024 05:57 PM    BASOPHILS 1.8 (H) 08/14/2024 05:57 PM      Lab Results   Component Value Date/Time    SODIUM 138 08/16/2024 03:55 AM    POTASSIUM 3.8 08/16/2024 03:55 AM    CHLORIDE 96 08/16/2024 03:55 AM    CO2 22 08/16/2024 03:55 AM    GLUCOSE 146 (H) 08/16/2024 03:55 AM    BUN 84 (H) 08/16/2024 03:55 AM    CREATININE 4.25 (HH) 08/16/2024 03:55 AM    GLOMRATE 18 (L) 10/25/2023 10:52 AM      Lab Results   Component Value Date/Time    CHOLSTRLTOT 290 (H) 08/14/2024 10:53 PM     (H) 08/14/2024 10:53 PM    HDL 66 08/14/2024 10:53 PM    TRIGLYCERIDE 263 (H) 08/14/2024 10:53 PM       Lab Results   Component Value Date/Time    ALKPHOSPHAT 124 (H) 08/14/2024 10:53 PM    ASTSGOT 19 08/14/2024 10:53 PM    ALTSGPT 8 08/14/2024 10:53 PM    TBILIRUBIN 0.2 08/14/2024 10:53 PM        Imaging/Testing:  CT-CTA NECK WITH & W/O-POST PROCESSING   Final Result         1.  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   2.  Thyroid nodules, recommend follow-up thyroid sonography due to nodule size and complexity as clinically appropriate.         CT-CTA HEAD WITH & W/O-POST PROCESS   Final Result         1.  Left P2 occlusion, which reconstitutes distally   2.  50-70% narrowing in proximal left M2 segment, could represent partial occlusion versus changes of vasospasm.   3.  Irregularity of the right M1 segment, consider vasospasm, component of vasculitis, or other underlying vascular disease.   4.  Irregularity of the distal left vertebral artery, appearance suggesting underlying atherosclerotic vascular disease.      Findings and/or recommendations of the examination where conveyed digitally using the [employer] system toJp, and message was electronically verified as Read on 8/15/2024 8:36 PM.      CT-CEREBRAL PERFUSION  "ANALYSIS   Final Result         1. Cerebral blood flow less than 30% possibly representing completed infarct = 0 mL. Based on distribution of this finding, this is unlikely to represent artifact.      2. T Max more than 6 seconds possibly representing combination of completed infarct and ischemia = 0 mL. Based on the distribution of this finding, this is unlikely to represent artifact.      3. Mismatched volume possibly representing ischemic brain/penumbra= 0 mL      4.  Please note that this cerebral perfusion study and report is Quantitative and targets supratentorial (cerebral) perfusion for evaluation of large vessel territory acute ischemia/infarction. For example, lacunar infarcts, and brainstem/posterior fossa    ischemia/infarction are not evaluated on this study.  Data acquisition is subject to artifacts which can yield non-anatomically plausible perfusion maps which may be due to motion, bolus timing, signal to noise ratio, or other technical factors.    Perfusion map abnormalities which show non-anatomic distributions are likely artifact.   This study is not \"stand-alone\" and should only be utilized for diagnosis, management/treatment in correlation with CT, CTA, and/or MRI and clinical factors.         MR-BRAIN-W/O   Final Result      1.  A few acute small infarcts involving the left basal ganglia and posterior left parietal cortex.   2.  No definite acute intracranial hemorrhage.      IR-THROMBO MECHANICAL ARTERY,INIT   Final Result   Addendum (preliminary) 1 of 1   Addendum: The final angiogram demonstrates nonflow limiting severe    stenosis at left proximal M2 vessels and one of the M3 vessel.      Final      1.  Occluded LEFT M1 segment.   2.  Mechanical thrombectomy.   3.  TICI 2b flow.      CT-CHEST,ABDOMEN,PELVIS WITH   Final Result         1.  Occlusion of left superficial femoral artery with stent in place.   2.  Bilateral indeterminate thyroid nodules, recommend follow-up thyroid sonography as " clinically appropriate for further characterization due to nodule size.   3.  Gallbladder distention, additional workup as clinically appropriate.   4.  Atherosclerosis and atherosclerotic coronary artery disease      CT-HEAD W/O   Final Result         1.  No acute intracranial abnormality is identified, there are nonspecific white matter changes, commonly associated with small vessel ischemic disease.  Associated mild cerebral atrophy is noted.   2.  Atherosclerosis.               EC-ECHOCARDIOGRAM COMPLETE W/O CONT    (Results Pending)   MR-BRAIN-W/O    (Results Pending)   MR-BRAIN-W/O    (Results Pending)         Assessment and Plan:    79-year-old female transferred in outside hospital status post TNK found to have a left M1 distal occlusion.  Status post thrombectomy with a TICI score of 2B.  Patient is done remarkably well compared to her presentation.  Stroke scale currently  7 compared to 21.  Etiology of the infarct unknown.   MRI brain today.  Anticipate starting antiplatelet therapy tonight at the 24-hour mis which be around 7 PM today if repeat imaging does not show any concerning signs.    Update 8/16  Patient had change in neuroexam compared to my exam yesterday morning.  Speak to the nursing staff patient had off and on aphasia throughout the day.  Early in the evening she developed severe right upper extremity weakness.  I was contacted at this time.  I repeated CT head and neck which did not reveal an LVO but did show a mural thrombus in the aortic arch.  There is multivessel intracranial stenosis throughout.  Blood pressure at the time was 110 systolic.  I ordered a stat EEG.  Which showed focal slowing with some sharp waves over the left numbness.  Therefore started Keppra 1000 mg twice daily.  For the mural thrombus be started on heparin.  Ordered urgent MRI brain given the change in neurostatus.  This morning patient continues to have severe aphasia.  And right upper extremity  ehsan.    Plan:  1 repeat MRI brain  2.  Keep the blood pressure between 140s to 160 systolic.  3.  Continue telemonitoring  4.  TTE without bubble  5.  PT/OT and speech therapy  6.  Continue statin for an LDL goal below 70  7.  Maintain normoglycemia  8.  Continue the Keppra 1000 mg twice daily.        Spent over 60 minutes examining the patient, going over labs, vitals going over the care plan the team reviewing imaging and EEG results.    This chart was partially generated using voice recognition technology and sound alike word replacement may be present, best efforts were made to make the chart accurate.    Jp Seals MD  Board Certified Neurology, ABPN  (t) [phone]

## 2024-08-16 NOTE — CONSULTS
"[facility] -  CONSULTATION NOTE               Author: Bg Reyez M.D. Date & Time: 8/16/2024  10:35 AM       REASON FOR CONSULTATION:   CKD Stage IV    CHIEF COMPLAINT:   Unable to assess given patient's clinical status     HISTORY OF PRESENT ILLNESS:    \"Jaclyn Goddard is a 79-year-old female with PMH significant for DM2, HTN, CKD 4, chronic anemia, CVA with unknown residual deficits, and recent GIB who transferred from [facility] as a stroke as well as possible seizure-like activity.  She presented to OSH via EMS after sustaining a fall from her scooter, striking the right side of her head.  Initial NIH 23.  She received TNKase at 1915 with repeat NIH 10.  CT head showed left M1 thrombus.  She transferred here and went directly to IR for mechanical thrombectomy with TICI 2B flow and was admitted to the ICU postprocedure\"  She has a h/o CKD IV was briefly on HD in [facility] but recovered function. She has a functioning Left forearm AVF. Scr on admission was 2.77 on 8/14. On 8/15 it was 3.4 and today Scr is 4.26. Electrolytes are stable and she is not volume overloaded. She had BRBPR yesterday but this has resolved. She had 1 Liter of UP drained after nicholas placement but minimal UOP since.     REVIEW OF SYSTEMS:    Unable to assess given patient's clinical status     PAST MEDICAL HISTORY:   History reviewed. No pertinent past medical history.    PAST SURGICAL HISTORY:   History reviewed. No pertinent surgical history.    FAMILY HISTORY:   No family history on file.    SOCIAL HISTORY:   Social History     Tobacco Use   Smoking Status Not on file   Smokeless Tobacco Not on file     Social History     Substance and Sexual Activity   Alcohol Use None     Social History     Substance and Sexual Activity   Drug Use Not on file       HOME MEDICATIONS:   Reviewed and documented in chart    LABORATORY STUDIES:   Recent Labs     08/14/24  2253 08/15/24  1105 08/16/24  0355 " "  SODIUM 135 135 138   POTASSIUM 2.8* 3.7 3.8   CHLORIDE 95* 96 96   CO2 22 23 22   GLUCOSE 124* 146* 146*   BUN 76* 83* 84*   CREATININE 2.77* 3.40* 4.25*   CALCIUM 9.8 9.6 9.2       ALLERGIES:  Metformin, Valsartan, and Ciprofloxacin    VS:  BP (!) 143/82   Pulse 90   Temp 36.6 °C (97.9 °F) (Temporal)   Resp 16   Ht 1.651 m (5' 5\")   Wt 96.9 kg (213 lb 10 oz)   SpO2 97%   BMI 35.55 kg/m²     Physical Exam  Vitals and nursing note reviewed.     Constitutional:       General: She is sleeping. She is not in acute distress.     Appearance: She is ill-appearing. She is not toxic-appearing.      Interventions: Nasal cannula in place.   HENT:      Head: Normocephalic.      Nose: Nose normal.      Mouth/Throat:      Lips: Pink.      Mouth: Mucous membranes are moist.   Eyes:      Pupils: Pupils are equal, round, and reactive to light.   Cardiovascular:      Rate and Rhythm: Normal rate and regular rhythm.      Pulses: Normal pulses.      Heart sounds: Normal heart sounds.   Pulmonary:      Effort: Pulmonary effort is normal.      Breath sounds: Normal breath sounds. No wheezing or rhonchi.   Abdominal:      General: Bowel sounds are normal.      Palpations: Abdomen is soft.   Musculoskeletal:      Right lower leg: No edema.      Left lower leg: No edema.      Comments: Unable to assess given patient's clinical status   Skin:     General: Skin is warm and dry.      Capillary Refill: Capillary refill takes less than 2 seconds.   Neurological:      Unable to assess given patient's clinical status   Psychiatric:         Unable to assess given patient's clinical status       Comments: Mumbles words    FLUID BALANCE:  In: 830.8 [I.V.:830.8]  Out: 135     IMAGING:  All imaging reviewed from admission to present day    A/P:   # 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 Currently anuric.   # CKD Stage IV   # HTN  # Anemia  # BRBPR  # DM  # Acute Ischemic CVA  # PVD  # CKD-MBD    - There is no acute " need for RRT today. We will assess tomorrow am and if still anuric then will likely need to initiate HD again  - Keep MAP >/= to 65  - Avoid nephrotoxins  - Renal dose meds as necessary  - Monitor UOP    Thank you for the consultation!

## 2024-08-16 NOTE — THERAPY
Speech Language Therapy Contact Note    Patient Name: Jaclyn Goddard  Age:  79 y.o., Sex:  female  Medical Record #: [id number]  Today's Date: 8/16/2024    Discussed missed therapy with RN       08/16/24 0946   Treatment Variance   Reason For Missed Therapy Medical - Patient on Hold from Therapy;Medical - Patient not Able To Participate   Interdisciplinary Plan of Care Collaboration   IDT Collaboration with  Nursing   Collaboration Comments Attempted to see patient for CSE/Cog. Per RN, patient is unarousable, unable to participate. Service will hold and attempt as able/medically appropriate.

## 2024-08-16 NOTE — CONSULTS
Physical Medicine and Rehabilitation Consultation          Date of initial consultation: 8/16/2024  Consulting provider: Susana Morales   Reason for consultation: assess for acute inpatient rehab appropriateness  LOS: 2 Day(s)    Chief complaint: Left MCA stroke    HPI: The patient is a 79 y.o. right hand dominant female with a past medical history of hypertension, gout;  who presented on 8/14/2024  8:38 PM as a transfer from [facility] for stroke workup.  Patient initially presented to [facility] after she fell out of her chair.  Patient was initially treated for seizures with Keppra, underwent CT head and CTA which found left M2 occlusion, patient was given TNK and transferred to [facility] for IR.  Seen by neurology and found to have a NIH score of 21, taken to IR for mechanical thrombectomy of left M1 thrombus resulting in TICI 2B reperfusion.  NIH score improved to 7 yesterday, and MRI showed minimal burden with only a few acute small infarcts involving the left basal ganglia and posterior left parietal cortex.  Then she had deterioration, and repeat CTA found mural thrombus in the arch extending to the left carotid.  Patient restarted on heparin, and blood pressure augmented to 140-160 systolic    The patient currently is displaying expressive and receptive aphasia.  No family at bedside to help answer questions.  Awaiting repeat MRI to assess for neurologic change.    ROS  Pertinent positives are mentioned in the HPI, all others reviewed and are negative.    Social Hx:  1 SH  1 LORIE  With: Spouse    THERAPY:  Restrictions: Fall precautions  PT: Functional mobility   8/15: Max assist x 2 persons for sit to stand, no gait    OT: ADLs  8/15: Total assist lower body dressing    SLP:   Pending    IMAGING:  MR brain 8/15/2024    1.  A few acute small infarcts involving the left basal ganglia and posterior left parietal cortex.  2.  No definite acute intracranial hemorrhage.    PROCEDURES:  Dr. Burnett  "MG Pennington 8/14/2024  Mechanical thrombectomy of left M1 thrombus TICI 2B    PMH:  History reviewed. No pertinent past medical history.    PSH:  History reviewed. No pertinent surgical history.    FHX:  Non-pertinent to today's issues    Medications:  Current Facility-Administered Medications   Medication Dose    levETIRAcetam (Keppra) injection 1,000 mg  1,000 mg    atorvastatin (Lipitor) tablet 80 mg  80 mg    morphine 4 MG/ML injection 2 mg  2 mg    heparin infusion 25,000 units in 500 mL 0.45% NACL  0-30 Units/kg/hr (Adjusted)    labetalol (Normodyne/Trandate) injection 10-20 mg  10-20 mg    hydrALAZINE (Apresoline) injection 10-20 mg  10-20 mg    norepinephrine (Levophed) 8 mg in 250 mL NS infusion (premix)  0-1 mcg/kg/min (Ideal)    niCARdipine (Cardene) 25 mg in  mL Standard Infusion  0-15 mg/hr    Respiratory Therapy Consult      acetaminophen (Tylenol) tablet 650 mg  650 mg    senna-docusate (Pericolace Or Senokot S) 8.6-50 MG per tablet 2 Tablet  2 Tablet    And    polyethylene glycol/lytes (Miralax) Packet 1 Packet  1 Packet    ondansetron (Zofran) syringe/vial injection 4 mg  4 mg    ondansetron (Zofran ODT) dispertab 4 mg  4 mg    insulin regular (HumuLIN R,NovoLIN R) injection  2-9 Units    And    dextrose 50% (D50W) injection 25 g  25 g       Allergies:  Allergies   Allergen Reactions    Metformin Anaphylaxis     Heart attack symtoms    Valsartan Anaphylaxis     Heart attack symptoms    Ciprofloxacin Shortness of Breath     Cant breathe         Physical Exam:  Vitals: BP (!) 177/75   Pulse 78   Temp 36.6 °C (97.9 °F) (Temporal)   Resp 20   Ht 1.651 m (5' 5\")   Wt 96.9 kg (213 lb 10 oz)   SpO2 98%   Gen: NAD  Head: NC/AT  Eyes/ Nose/ Mouth: PERRLA, moist mucous membranes  Cardio: RRR, good distal perfusion, warm extremities  Pulm: normal respiratory effort, no cyanosis   Abd: Soft NTND, negative borborygmi   Ext: No peripheral edema. No clubbing. Left arm wrapped in gauze with soak " through.     Mental status: Unable to answer questions or follow commands   Speech: None     Labs: Reviewed and significant for   Recent Labs     08/14/24  1757 08/15/24  0216 08/15/24  0509 08/15/24  1417 08/15/24  2135 08/16/24  0355   RBC 3.68*  --  3.30*  --   --  3.02*   HEMOGLOBIN 11.2*   < > 9.9* 9.5*  --  9.0*   HEMATOCRIT 33.7*   < > 30.7* 28.9*  --  28.3*   PLATELETCT 373  --  354  --   --  401   PROTHROMBTM  --   --   --   --  13.7  --    APTT  --   --   --   --  26.9  --    INR  --   --   --   --  1.04  --     < > = values in this interval not displayed.     Recent Labs     08/14/24  2253 08/15/24  1105 08/16/24  0355   SODIUM 135 135 138   POTASSIUM 2.8* 3.7 3.8   CHLORIDE 95* 96 96   CO2 22 23 22   GLUCOSE 124* 146* 146*   BUN 76* 83* 84*   CREATININE 2.77* 3.40* 4.25*   CALCIUM 9.8 9.6 9.2     Recent Results (from the past 24 hour(s))   MAGNESIUM    Collection Time: 08/15/24  8:40 AM   Result Value Ref Range    Magnesium 2.5 1.5 - 2.5 mg/dL   PHOSPHORUS    Collection Time: 08/15/24  8:40 AM   Result Value Ref Range    Phosphorus 8.3 (H) 2.5 - 4.5 mg/dL   Basic Metabolic Panel    Collection Time: 08/15/24 11:05 AM   Result Value Ref Range    Sodium 135 135 - 145 mmol/L    Potassium 3.7 3.6 - 5.5 mmol/L    Chloride 96 96 - 112 mmol/L    Co2 23 20 - 33 mmol/L    Glucose 146 (H) 65 - 99 mg/dL    Bun 83 (H) 8 - 22 mg/dL    Creatinine 3.40 (H) 0.50 - 1.40 mg/dL    Calcium 9.6 8.5 - 10.5 mg/dL    Anion Gap 16.0 7.0 - 16.0   ESTIMATED GFR    Collection Time: 08/15/24 11:05 AM   Result Value Ref Range    GFR (CKD-EPI) 13 (A) >60 mL/min/1.73 m 2   TSH WITH REFLEX TO FT4    Collection Time: 08/15/24 11:05 AM   Result Value Ref Range    TSH 1.650 0.380 - 5.330 uIU/mL   POCT glucose device results    Collection Time: 08/15/24 12:25 PM   Result Value Ref Range    POC Glucose, Blood 152 (H) 65 - 99 mg/dL   HEMOGLOBIN AND HEMATOCRIT    Collection Time: 08/15/24  2:17 PM   Result Value Ref Range    Hemoglobin 9.5 (L)  12.0 - 16.0 g/dL    Hematocrit 28.9 (L) 37.0 - 47.0 %   POCT glucose device results    Collection Time: 08/15/24  5:32 PM   Result Value Ref Range    POC Glucose, Blood 104 (H) 65 - 99 mg/dL   aPTT    Collection Time: 08/15/24  9:35 PM   Result Value Ref Range    APTT 26.9 24.7 - 36.0 sec   Prothrombin Time    Collection Time: 08/15/24  9:35 PM   Result Value Ref Range    PT 13.7 12.0 - 14.6 sec    INR 1.04 0.87 - 1.13   Heparin Xa (Unfractionated)    Collection Time: 08/15/24  9:35 PM   Result Value Ref Range    Heparin Xa (UFH) <0.10 IU/mL   POCT glucose device results    Collection Time: 08/16/24 12:01 AM   Result Value Ref Range    POC Glucose, Blood 135 (H) 65 - 99 mg/dL   CBC WITHOUT DIFFERENTIAL    Collection Time: 08/16/24  3:55 AM   Result Value Ref Range    WBC 13.3 (H) 4.8 - 10.8 K/uL    RBC 3.02 (L) 4.20 - 5.40 M/uL    Hemoglobin 9.0 (L) 12.0 - 16.0 g/dL    Hematocrit 28.3 (L) 37.0 - 47.0 %    MCV 93.7 81.4 - 97.8 fL    MCH 29.8 27.0 - 33.0 pg    MCHC 31.8 (L) 32.2 - 35.5 g/dL    RDW 56.5 (H) 35.9 - 50.0 fL    Platelet Count 401 164 - 446 K/uL    MPV 11.2 9.0 - 12.9 fL   Basic Metabolic Panel    Collection Time: 08/16/24  3:55 AM   Result Value Ref Range    Sodium 138 135 - 145 mmol/L    Potassium 3.8 3.6 - 5.5 mmol/L    Chloride 96 96 - 112 mmol/L    Co2 22 20 - 33 mmol/L    Glucose 146 (H) 65 - 99 mg/dL    Bun 84 (H) 8 - 22 mg/dL    Creatinine 4.25 (HH) 0.50 - 1.40 mg/dL    Calcium 9.2 8.5 - 10.5 mg/dL    Anion Gap 20.0 (H) 7.0 - 16.0   Heparin Xa (Unfractionated)    Collection Time: 08/16/24  3:55 AM   Result Value Ref Range    Heparin Xa (UFH) 0.38 IU/mL   ESTIMATED GFR    Collection Time: 08/16/24  3:55 AM   Result Value Ref Range    GFR (CKD-EPI) 10 (A) >60 mL/min/1.73 m 2   POCT glucose device results    Collection Time: 08/16/24  6:06 AM   Result Value Ref Range    POC Glucose, Blood 127 (H) 65 - 99 mg/dL         ASSESSMENT:  Patient is a 79 y.o. female admitted with left MCA stroke       Rehabilitation: Impaired ADLs and mobility  Barriers to transfer include: Insurance authorization, TCCs to verify disposition, medical clearance and bed availability. All cases are subject to administrative review.     [facility] Code / Diagnosis to Support: 0001.2 - Stroke: Right Body Involvement (Left Brain)    Disposition recommendations:  -Not appropriate for rehab at this time.  PMR will follow.  Patient has had waxing and waning symptoms.  Awaiting repeat MRI to determine if further processes have occurred.  -TCC to evaluate discharge support  -PMR to follow in the periphery for rehab appropriateness, please reach out with questions or request for medical management    Medical Complexity:    Left MCA stroke  -Initial NIH score 21  -Status post TNK and thrombectomy with TICI 2B reperfusion  -NIH score improved to 7  -MRI brain shows small left basal ganglia stroke as the predominant lesion  -Patient's exam decompensated, NIH score 13 now  -Awaiting repeat MR brain  -Permissive hypertension 140-160 systolic  -Started on Keppra for seizure control  -Atorvastatin 80 mg nightly  -PT OT and SLP on hold until patient improves    GEETA  -Creatinine 4.25, GFR 10  -Avoid nephrotoxic agents such as gabapentin  -Nephrology following, no acute need for RRT today    Hypertension  -Current goal 140-160 systolic  -Primary team managing with as needed hydralazine, labetalol, nicardipine    Acute blood loss anemia  -Hemoglobin 9.0  -Bleeding from right arm in the setting of heparin after ground-level fall    Leukocytosis  -WBC 13.3  -Possible stress reaction  -MAT monitoring and managing    DVT PPX: Heparin      Thank you for allowing us to participate in the care of this patient.     Patient was seen for >80 minutes on unit/floor of which > 50% of time was spent on counseling and coordination of care regarding the above, including prognosis, risk reduction, benefits of treatment, and options for next stage of care.    Edson  Katelynn, DO   Physical Medicine and Rehabilitation     Please note that this dictation was created using voice recognition software. I have made every reasonable attempt to correct obvious errors, but there may be errors of grammar and possibly content that I did not discover before finalizing the note.

## 2024-08-16 NOTE — THERAPY
Physical Therapy   Daily Treatment     Patient Name: Jaclyn Goddard  Age:  79 y.o., Sex:  female  Medical Record #: [id number]  Today's Date: 8/16/2024     Precautions  Precautions: Fall Risk  Comments: -160, seizure precautions    Assessment    Patient seen for PT treatment session. Able to participate with limited functional mobility as detailed below. Will continue to benefit from PT services and recommend post-acute placement at this time.     Plan    Treatment Plan Status: Continue Current Treatment Plan  Type of Treatment: Bed Mobility, Equipment, Family / Caregiver Training, Gait Training, Neuro Re-Education / Balance, Stair Training, Therapeutic Activities, Therapeutic Exercise  Treatment Frequency: 4 Times per Week  Treatment Duration: Until Therapy Goals Met    DC Equipment Recommendations: Unable to determine at this time  Discharge Recommendations: Recommend post-acute placement for additional physical therapy services prior to discharge home    Objective       08/16/24 1442   Time In/Time Out   Therapy Start Time 1413   Therapy End Time 1442   Total Therapy Time 29   Precautions   Precautions Fall Risk;Swallow Precautions;Other (See Comments)   Comments SBP goal: 130-160; s/p TNK; Seizure precautions   Vitals   Pulse 86   Patient BP Position Eastman's Position   Blood Pressure  (!) 164/63   Pulse Oximetry 95 %   O2 Delivery Device None - Room Air   Vitals Comments While seated EOB, BP fluctuating upto 160-170 for few minutes and then returned back to 150s. Post activity: 156/54, 85, 95. BP monitored via arterial line.   Pain   Pain Scales Non Verbal Scale   Intervention Rest;Repositioned   Pain 0 - 10 Group   Location Arm   Location Orientation Left   Therapist Pain Assessment During Activity  (Patient pointing to her R arm when asked about pain)   Cognition    Cognition / Consciousness X   Speech/ Communication Expressive Aphasia;Receptive Aphasia   Level of Consciousness Responds to voice   Ability  To Follow Commands 1 Step  (Intermittently)   Safety Awareness Impaired   New Learning Impaired   Attention Impaired  (Appears to have R side inattention)   Sequencing Impaired   Initiation Impaired   Passive ROM Lower Body   Passive ROM Lower Body X   Comments Decreased B/L dorsiflexion-possibly due to muscle stiffness   Active ROM Lower Body    Active ROM Lower Body  X   Comments Seated EOB: Able to perform B/L knee extension, more ROM on L as compared to R; able to initiate B/L hip flexion, no movement at R ankle, mild initiation of L ankle DF/PF   Lower Body Muscle Tone   Lower Body Muscle Tone  WDL   Sitting Lower Body Exercises   Sitting Lower Body Exercises Yes   Long Arc Quad 2 sets of 10;Bilateral   Comments Seated EOB   Neuro-Muscular Treatments   Neuro-Muscular Treatments Postural Facilitation;Tactile Cuing;Verbal Cuing   Comments Seated EOB   Vision   Vision Comments Appears to have R side inattention, inattention to RUE/RLE; With cues able to turn her head partially to R, able to look partially towards R with cues   Other Treatments   Other Treatments Provided Discussed with patient's spouse regarding PLOF. Please see below for details. Assisted with appropriate positioning in bed, at end of session with use of pillows   Balance   Sitting Balance (Static) Trace +   Sitting Balance (Dynamic) Trace   Weight Shift Sitting Poor   Skilled Intervention Facilitation;Sequencing;Tactile Cuing;Verbal Cuing   Comments Seated EOB; Assisted with appropriate positioning of UE on pillows, cues for weight shift to help with balance; cues for upright posture   Bed Mobility    Supine to Sit Maximal Assist   Sit to Supine Maximal Assist   Scooting Total Assist   Rolling Maximal Assist to Rt.;Total Assist to Lt.   Skilled Intervention Verbal Cuing;Tactile Cuing;Sequencing;Facilitation;Compensatory Strategies   Comments HOB raised, towards L side; patient able to bring BLE OOB; able to assist with lowering trunk into  sidelying position   Gait Analysis   Gait Level Of Assist Unable to Participate   Functional Mobility   Sit to Stand Unable to Participate   Bed, Chair, Wheelchair Transfer Unable to Participate   Mobility Bed-EOB-sitting balance/posture-LE ex's-visual scanning-bed-HOB raised   6 Clicks Assessment - How much HELP from from another person do you currently need... (If the patient hasn't done an activity recently, how much help from another person do you think he/she would need if he/she tried?)   Turning from your back to your side while in a flat bed without using bedrails? 2   Moving from lying on your back to sitting on the side of a flat bed without using bedrails? 2   Moving to and from a bed to a chair (including a wheelchair)? 1   Standing up from a chair using your arms (e.g., wheelchair, or bedside chair)? 1   Walking in hospital room? 1   Climbing 3-5 steps with a railing? 1   6 clicks Mobility Score 8   Activity Tolerance   Sitting Edge of Bed < 15 minutes   Patient / Family Goals    Patient / Family Goal #1 Spouse would like patient to return to prior level of function   Goal #1 Outcome Progressing slower than expected   Short Term Goals    Short Term Goal # 1 Patient will perform supine-sit, sit-supine with HOB raised, with bed rails, with CGA in 6 visits to progress with bed mobility   Goal Outcome # 1 Progressing slower than expected   Short Term Goal # 2 Patient will sit EOB with fair balance with supervision for 20 minutes in 6 visits to progress with upright sitting balance & tolerance   Goal Outcome # 2 Progressing slower than expected   Short Term Goal # 3 Patient will perform sit-stand with LRAD with Min A in 6 visits to progress with functional mobility   Goal Outcome # 3 Goal not met   Short Term Goal # 4 Patient will perform chair transfers with LRAD with Min A in 6 visits to progress with transfers   Goal Outcome # 4 Goal not met   Short Term Goal # 5 Patient will ambulate 25 feet with LRAD  with Min A in 6 visits to progress with distance of ambulation   Goal Outcome # 5 Goal not met   Physical Therapy Treatment Plan   Physical Therapy Treatment Plan Continue Current Treatment Plan   Anticipated Discharge Equipment and Recommendations   DC Equipment Recommendations Unable to determine at this time   Discharge Recommendations Recommend post-acute placement for additional physical therapy services prior to discharge home   Interdisciplinary Plan of Care Collaboration   IDT Collaboration with  Nursing;Family / Caregiver;Occupational Therapist   Patient Position at End of Therapy In Bed;Bed Alarm On;Call Light within Reach;Tray Table within Reach;Family / Friend in Room  (NP and family at bedside)   Session Information   Date / Session Number  8/16-2(2/4, 8/21)        08/16/24 0769   Prior Living Situation   Prior Services Home-Independent   Housing / Facility 1 Story House   Steps Into Home 0   Steps In Home 0   Equipment Owned Single Point Cane;Scooter   Lives with - Patient's Self Care Capacity Spouse   Prior Level of Functional Mobility   Bed Mobility Independent   Transfer Status Independent   Ambulation Independent   Ambulation Distance Community   Assistive Devices Used Single Point Cane  (PRN)   Comments Spouse provided the above information this session.     Patient seen for team treatment with Occupational Therapist for the following reason(s):  Due to the medical complexity, the skill of both practitioners is needed to monitor vitals, patient status, and adjust the intervention to fit the patient's needs and goals.  Physical Therapist facilitated bed mobility, sitting balance and posture, LE ex's as detailed above.

## 2024-08-16 NOTE — PROGRESS NOTES
Brief neurology note      Patient had deterioration exam today last seen stable at 5 PM today.  Now she is not responding.  Movement in the right side less so.  I repeated the CTA head and perfusion scan there is no obvious LVO or intracranial lesion.  Perfusion scan is unremarkable.  However CTA neck that showed mural thrombus in the arch extending to the left carotid.  Could be the source of the LVO yesterday.  Recommend started on anticoagulation with heparin.  Bolus protocol is okay.  There is minimal stroke burden on MRI brain today.  Also earlier today patient's blood pressures running in the 140s and 150s with a stable exam.  She does have multiple stenosis intracranially and her pressures at 108 systolic currently.  Recommend increase in the pressure back up to 140 systolic.  Keep  ranged between 140-160 systolic.

## 2024-08-16 NOTE — THERAPY
Occupational Therapy  Daily Treatment     Patient Name: Jaclyn Goddard  Age:  79 y.o., Sex:  female  Medical Record #: [id number]  Today's Date: 8/16/2024     Precautions  Precautions: Fall Risk  Comments: -160, seizure precautions    Assessment    Pt seen for OT treatment. Since initial evaluation, MRI revealed new infarcts to the L parietal and frontal regions. BP parameters adjusted to 130-160. Pt required max-total A for bed mobility, mod A to wash face while seated EOB, and total A to don socks. Pt noted to not verbalize this session and demonstrated a possible L gaze preference. Additionally, no AROM noted in R UE this date. Pt's family present for part of session and able to help provide history. Pt was independent with ADLs prior to admission and used a SPC to walk. Pt lived in a one story house with a ramp to enter, a walk in shower with a chair, and built in bench, and grab bars. Pt current functional performance limited by impaired activity tolerance, impaired balance, impaired cognition, R sided deficits, and possible visual deficits (did not follow commands accurately for formal assessment). Pt will continue to benefit from skilled OT while admitted to acute care.     Patient seen for team treatment with Physical Therapist for the following reason(s):  Patient required 2 person assistance for safety and to provide effective interventions. Each discipline assisted patient with appropriate and separate goals. Due to the medical complexity, the skill of both practitioners is needed to monitor vitals, patient status, and adjust the intervention to fit the patient's needs and goals.  Occupational Therapist facilitated ADL participation and further assessment of additional factors required for functional performance while Physical Therapist simultaneously treated pt according to POC.     Plan    Treatment Plan Status: Continue Current Treatment Plan  Type of Treatment: Self Care / Activities of Daily  Living, Adaptive Equipment, Manual Therapy Techniques, Neuro Re-Education / Balance, Therapeutic Exercises, Therapeutic Activity  Treatment Frequency: 4 Times per Week  Treatment Duration: Until Therapy Goals Met    DC Equipment Recommendations: Unable to determine at this time  Discharge Recommendations: Recommend post-acute placement for additional occupational therapy services prior to discharge home     Objective     08/16/24 1444   Vitals   Pulse 85   Blood Pressure  (!) 150/50  (SBP in 160s with EOB)   Pulse Oximetry 94 %   O2 Delivery Device None - Room Air   Pain   Pain Scales 0 to 10 Scale    Pain 0 - 10 Group   Therapist Pain Assessment Nurse Notified;Post Activity Pain Same as Prior to Activity  (Pt noted to rub R UE with L UE after initially sitting EOB, R UE bandaged and bloody, may have been pain related)   Non Verbal Descriptors   Non Verbal Scale  Calm   Cognition    Cognition / Consciousness X   Speech/ Communication   (Pt did not verbalize this session)   Level of Consciousness Responds to voice   Ability To Follow Commands Unable to Follow 1 Step Commands  (followed 1 step commands ~25% of the time, benefited from multimodel command (simple verbal command with visual demonstration))   Safety Awareness Impaired   New Learning Impaired   Attention Impaired   Sequencing Impaired   Initiation Impaired   Comments Cooperative   Active ROM Upper Body   Comments No active ROM noted to R UE, able to reach face with L UE   Strength Upper Body   Comments L UE 3 to 3+/5, no AROM noted in R UE   Sensation Upper Body   Comments Pt rubbing bandages on R UE with L UE upon initially sitting EOB, may have been pain related   Upper Body Muscle Tone   Upper Body Muscle Tone  X   Rt Upper Extremity Muscle Tone Non Functional;Hypotonic   Other Treatments   Other Treatments Provided Pt's family present for part of session and able to provide history. Pt was independent with ADLs prior to admission and used a SPC to  walk. Pt lived in a one story house with a ramp to enter, a walk in shower with a chair, and built in bench, and grab bars.   Balance   Sitting Balance (Static) Trace +   Sitting Balance (Dynamic) Trace   Weight Shift Sitting Poor   Skilled Intervention Tactile Cuing;Verbal Cuing;Sequencing;Postural Facilitation;Facilitation   Comments w/ x2 assist for safety   Bed Mobility    Supine to Sit Maximal Assist   Sit to Supine Total Assist   Scooting Total Assist   Rolling Total Assist to Rt.;Total Assist to Lt.   Skilled Intervention Verbal Cuing;Tactile Cuing;Sequencing;Postural Facilitation;Facilitation   Comments x2 assist   Activities of Daily Living   Grooming Moderate Assist;Seated  (washed face with L UE, required hand over hand for initiation but able to complete washing her mouth/nose after; pt fatigued after face washing (done near end of session after pt EOB ~5-10 min))   Lower Body Dressing Total Assist  (socks)   Skilled Intervention Verbal Cuing;Tactile Cuing;Facilitation   ICU Target Mobility Level   ICU Mobility - Targeted Level Level 2   Visual Perception   Visual Perception    (Pt noted to look to the R more than the L side, attempted to have pt locate family members on the R side of the room)   Comments Pt also required cues to keep her eyes open throughout session   Activity Tolerance   Sitting in Chair NT   Sitting Edge of Bed ~5-10 min   Standing NT   Comments limited by impaired cognition, generalized weakness, fatigue   Patient / Family Goals   Patient / Family Goal #1 none stated   Short Term Goals   Short Term Goal # 1 Pt will maintain balance while seated EOB with supv in prep for ADL participation   Goal Outcome # 1 Progressing as expected   Short Term Goal # 2 Pt will perform seated gown change with supv   Goal Outcome # 2 Goal not met   Short Term Goal # 3 Pt will perform seated g/h w/ supv   Goal Outcome # 3 Progressing as expected   Education Group   Education Provided Role of Occupational  Therapist;Activities of Daily Living   Role of Occupational Therapist Patient Response Patient;Acceptance;Explanation;Verbal Demonstration   ADL Patient Response Patient;Acceptance;Explanation;Demonstration;Verbal Demonstration;Action Demonstration

## 2024-08-16 NOTE — PROCEDURES
[facility]    Inpatient Standard Video Electroencephalogram Report      Patient Name: Jaclyn Goddard  MRN: [id number]  #: [id number]  Date of Service: 08/15/24  Total Recording Time: 0 hours and 25 minutes.  Referring Provider: Júnior Hudson M.D.    INDICATION:  Jaclyn Goddard 79 y.o. female. This standard, inpatient, EEG was requested to evaluate for seizure(s).    CURRENT ANTI-SEIZURE AND OTHER PERTINENT MEDICATIONS:     Current Facility-Administered Medications:     atorvastatin    morphine injection    heparin    labetalol    hydrALAZINE    NORepinephrine    niCARdipine (Cardene) 25 mg in  mL Standard Infusion    Respiratory Therapy Consult    acetaminophen    senna-docusate **AND** polyethylene glycol/lytes    ondansetron    ondansetron    insulin regular **AND** POC blood glucose manual result **AND** NOTIFY MD and PharmD **AND** Administer 20 grams of glucose (approximately 8 ounces of fruit juice) every 15 minutes PRN FSBG less than 70 mg/dL **AND** dextrose bolus    pantoprazole    TECHNIQUE: Standard inpatient video EEG was set up by a Neurodiagnostic technologist who performed education to the patient and staff. A minimum of 23 electrodes and 23 channel recording was setup and performed by Neurodiagnostic technologist, in accordance with the international 10-20 system. Impedence, electrode integrity, and technical impressions were documented. The study was reviewed in bipolar and referential montages. The recording examined the patient in the awake and drowsy state(s). There was at times significant muscle/electrical artifact, obscuring parts of the study.     DESCRIPTION OF THE RECORD:  EEG background: During maximal wakefulness, the background was continuous, asymmetrical, and poorly defined and/or fragmented 7 Hz posterior dominant rhythm, with a mixture of theta, delta, and some overriding faster activity.  Reactivity  was seen. State changes were seen.  During drowsiness, a loss of myogenic  artifact  and theta/delta frequencies were seen.     N2 sleep architecture was not seen.    ACTIVATION PROCEDURES:   Intermittent Photic stimulation was performed in a stepwise fashion from 1 to 30 Hz and did not elicit any additional abnormalities on EEG.     ICTAL AND INTERICTAL FINDINGS:   There was additional, almost continuous, left hemispheric, maximal over the left temporal region, focal slowing, with at times semirhythmic delta, as well as embedded sharply contoured waveforms.    No definite electroclinical or electrographic seizures were reported or recorded during the study.     EKG: Sampling of the EKG recording did not demonstrate any abnormalities    EVENTS: The patient exhibited intermittent left hand tremor, but no ictal/interictal EEG correlate was noted.    INTERPRETATION:  This was an abnormal video EEG recording in the awake and drowsy state(s):  Diffuse slowing of the background, suggestive of diffuse and/or multifocal cerebral dysfunction.  This finding may be seen in a myriad of encephalopathies and in itself is a nonspecific finding.  The presence of additional, almost continuous, left hemispheric, maximal over the left temporal region, focal slowing, with at times semirhythmic delta, as well as embedded sharply contoured waveforms, is suggestive of additional cerebral dysfunction in that region, as well as potential propensity toward focal seizures arising from that region. Clinical and radiological correlation is recommended.   There were no electrographic seizures captured.    Raymundo Mijares MD  Neurology Attending, Epilepsy Program  [facility]

## 2024-08-16 NOTE — ASSESSMENT & PLAN NOTE
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

## 2024-08-16 NOTE — PROCEDURES
"Arterial Line Insertion    Date/Time: 8/16/2024 8:37 AM    Performed by: JOHN Torre  Authorized by: JOHN Torre  Consent: Verbal consent obtained.  Patient identity confirmed: arm band  Time out: Immediately prior to procedure a \"time out\" was called to verify the correct patient, procedure, equipment, support staff and site/side marked as required.  Preparation: Patient was prepped and draped in the usual sterile fashion.  Indications: hemodynamic monitoring  Location: right radial    Sedation:  Patient sedated: no    Needle gauge: 20  Seldinger technique: Seldinger technique used  Number of attempts: 1  Post-procedure: line sutured and dressing applied  Post-procedure CMS: unchanged  Patient tolerance: patient tolerated the procedure well with no immediate complications      Wire removal: After insertion of the catheter via Seldinger technique the guidewire was removed intact and confirmed by the assistant in the room.    JOHN Torre        "

## 2024-08-16 NOTE — DOCUMENTATION QUERY
"                                                                         [facility]                                                                       Query Response Note      PATIENT:               RUBEN BARKER  ACCT #:                  [id number]  MRN:                     [id number]  :                      1945  ADMIT DATE:       2024 8:38 PM  DISCH DATE:          RESPONDING  PROVIDER #:        [id number]           QUERY TEXT:    In your clinical judgement, please clarify the creatinine level of 2.77 to 3.40 with a correlating diagnosis.        Stages are defined by the [employer] as follows:  CKD Stage I  GFR >= 90 ml / min per 1.73 m2 and persistent albuminuria  CKD Stage 2 GFR between 60 and 89 with persistent albuminuria  CKD Stage 3a GFR between 45 - 59  CKD Stage 3b GFR between 30-44  CKD Stage 4 GFR between 15 and 29   CKD Stage 5 GFR between <15 or End Stage Renal Disease    The patient's Clinical Indicators include:  79 year old female transferred for a thrombectomy.     Carmen/Riley \"CKD (chronic kidney disease) stage 4, GFR 15-29 ml/min\"    8/15 Creatinine 3.40, GFR 13   Creatinine 2.77, GFR 17    Risk factors: CKD 4  Treatment: labs, Trend chemistry, renal function, strict I&O, Avoid nephrotoxic drugs    If you have any questions, please contact:  Kelly Bloom RN CDI [facility]  [email or web address]  Kelly Bloom [address] [employer]    Note: If you agree with a diagnosis listed, please remember to include it in your concurrent daily documentation and onto the Discharge Summary.  Options provided:   -- GEETA on Chronic kidney disease Stage 4   -- Chronic kidney disease Stage 4, at baseline   -- ATN   -- Other explanation-      Query created by: Kelly Bloom on 8/15/2024 12:18 PM    RESPONSE TEXT:    GEETA on Chronic kidney disease Stage 4          Electronically signed by:  Júnior Hudson MD 2024 11:51 AM              "

## 2024-08-16 NOTE — PROGRESS NOTES
2 RN sign off for MRI pump of Heparin infusion.     Patient, medication, dose, weight, rate 2 RN verification with Clare RN    12 units/kg/hr - 17.5mL/hr

## 2024-08-16 NOTE — DIETARY
"Nutrition Services: Initial Assessment     Day 2 of admit. Jaclyn Goddard is a 79 y.o. female with admitting DX of Acute ischemic stroke (HCC) [I63.9]     Consult received for enteral nutrition.     Nutrition Assessment:      Height: 165.1 cm (5' 5\")  Weight: 96.9 kg (213 lb 10 oz)   Body mass index is 35.55 kg/m². BMI classification: Obesity Class II.    Wt hx per chart review:   24 65.6 kg (144 lb 10 oz) - bed scale wt  24 96.9 kg (213 lb 10 oz) - suspect inaccurate given wt hx/recent trends  24 68 kg (150 lb)  24 68 kg (150 lb)  24 69.4 kg (153 lb)  24 70.3 kg (155 lb)  01/15/24 70 kg (154 lb 5.2 oz)  23 77.1 kg (170 lb)    Calculation/Equation: REE per MSJ (1143 kcal) x 1.2 = 1372 kcal/day  Total Calories / day: 1377 - 1640 (Calories / k - 25)  Total Grams Protein / day: 52 - 66 (Grams Protein / k.8 - 0.1) - restricted protein d/t pt w/ GEETA on CKD 4, no RRT    Protein needs on HD: 79 - 92 g/day (1.2-1.4 g/kg/day)     Objective:  Problem list    Acute ischemic stroke (HCC) (POA: Yes)    CKD (chronic kidney disease) stage 4, GFR 15-29 ml/min (HCC) (POA: Yes)    Primary hypertension (Chronic) (POA: Yes)    Anemia, chronic disease (POA: Yes)    Thyroid nodule (POA: Yes)    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  Enteral access: not present; placement pending  Pertinent labs: glu 104 - 152, BUN 84, crea 4.25, GFR 10  Pertinent meds: SSI (held), levo (stopped @ 0710 today), thiamine  Skin/wounds: no documented staged wounds. Consult pending to wound team for R arm skin tears, R knee, L arm fistula hematoma, skin tear chest, B/L " feet/heels   Food Allergies: NKFA  Last BM:  (PTA), Type of Stool (Consistency): Not observed per flowsheets  Per Nephrology note, pt w/ CKD IV briefly on HD, function recovered; AVF remains in place/functional. No plan for RRT today, though if pt remains anuric tomorrow, may start HD per Nephrology.  Most appropriate formula: low protein, CHO-controlled for pt w/ CKD4 hx and hyperglycemia.     Current diet order:   NPO w/ TF    Subjective:     Nutrition Focused Physical Exam (NFPE) - pt unable consent to physical exam; NFPE per observation  Wt loss: Possible 3.6% wt loss x10 days per flowsheets  Muscle mass: no overt signs of wasting  Subcutaneous fat: no overt signs of wasting  Fluid Accumulation: none documented  Reduced  Strength: N/A in acute care setting.     Nutrition Diagnosis:      Self-feeding difficulty  related to acute ischemic stroke, pt unable to maintain wakefulness to participate w/ SLP as evidenced by pt requiring enteral feeds to meet nutrition needs.     Based on RD assessment at this time, pt does not meet criteria in congruence with ASPEN/Academy guidelines for malnutrition.        Nutrition Interventions:      Initiate  Novasource Renal  at 15 ml/hr continuous and advance per protocol to goal rate 30 mL/hr  Provides 1440 kcals, 65 g protein, 132 g CHO, and 518 ml free water in 24 hours.   If HD started, change TF to Diabetisource AC (or equivalent Glucerna 1.2) at 55 mL/hr which will provide 1584 kcal, 79 g protein, 132 g CHO, 1082 mL free water in 24 hours.  Patient aware of active plan of care as appropriate.  Additional fluids per MD/DO     Nutrition Monitoring and Evaluation:     Monitor nutrition POC  Monitor weight trends.  Monitor renal function/possible RRT start; adjust TF as indicated.    RD following and will provide updated recommendations as indicated.

## 2024-08-16 NOTE — CARE PLAN
The patient is Watcher - Medium risk of patient condition declining or worsening    Shift Goals  Clinical Goals: -160  Patient Goals: BULMARO  Family Goals: Updates      Problem: Knowledge Deficit - Standard  Goal: Patient and family/care givers will demonstrate understanding of plan of care, disease process/condition, diagnostic tests and medications  Outcome: Progressing     Problem: Optimal Care of the Stroke Patient  Goal: Optimal acute care for the stroke patient  Outcome: Progressing     Problem: Knowledge Deficit - Stroke Education  Goal: Patient's knowledge of stroke and risk factors will improve  Outcome: Progressing     Problem: Neuro Status  Goal: Neuro status will remain stable or improve  Outcome: Progressing     Problem: Hemodynamic Monitoring  Goal: Patient's hemodynamics, fluid balance and neurologic status will be stable or improve  Outcome: Progressing     Problem: Mobility - Stroke  Goal: Patient's capacity to carry out activities will improve  Outcome: Progressing     Problem: Pain - Standard  Goal: Alleviation of pain or a reduction in pain to the patient’s comfort goal  Outcome: Progressing     Problem: Safety - Medical Restraint  Goal: Remains free of injury from restraints (Restraint for Interference with Medical Device)  Outcome: Progressing

## 2024-08-16 NOTE — PROGRESS NOTES
Brief neurology update note        MRI brain this morning shows new infarct compared to yesterday's MRI.  There are small scattered acute infarcts on the left hemisphere left frontal and parietal.  There is also additional tiny 1 in the right side.  Etiology unknown could be a combination of embolic clots from the aorta neuro thrombus.  However the left side could be from M2  stenosis.  The perfusion scan last night shows a hint of watershed type diffusion deficits on the left hemisphere.  Therefore recommend avoiding hypotension and this patient.  Continue with anticoagulation.  Will modify the blood pressure parameters to help avoid hypotension at the same time avoid too much pressure that may cause a bleed with anticoagulation on board.  Keep the blood pressure between 130-160 systolic.

## 2024-08-17 PROBLEM — Z71.89 ACP (ADVANCE CARE PLANNING): Status: ACTIVE | Noted: 2024-01-01
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
## 2024-08-17 NOTE — CARE PLAN
The patient is Watcher - Medium risk of patient condition declining or worsening    Shift Goals  Clinical Goals: SBP: 130-160  Patient Goals: BULMARO  Family Goals: Updates    Progress made toward(s) clinical / shift goals:    Problem: Neuro Status  Goal: Neuro status will remain stable or improve  Outcome: Progressing     Problem: Hemodynamic Monitoring  Goal: Patient's hemodynamics, fluid balance and neurologic status will be stable or improve  Outcome: Progressing     Maintained SBP goal     Problem: Pain - Standard  Goal: Alleviation of pain or a reduction in pain to the patient’s comfort goal  Outcome: Progressing     CPOT 0    Problem: Fall Risk  Goal: Patient will remain free from falls  Outcome: Progressing     Remained free of falls     Problem: Safety - Medical Restraint  Goal: Remains free of injury from restraints (Restraint for Interference with Medical Device)  Outcome: Progressing     Remained free from injury from restraints     Patient is not progressing towards the following goals: N/A

## 2024-08-17 NOTE — PROGRESS NOTES
Neurology Progress Note  Neurohospitalist Service, [facility]    Referring Physician: Júnior Hudson M.D.    Chief Complaint   Patient presents with    Possible Stroke     LKW 1645  2000 mg Keppra for seizure prophylaxis  TNK @ 1915       HPI: Refer to initial documented Neurology H&P, as detailed in the patient's chart.    Interval History 8/17: No new events overnight        Review of systems: In addition to what is detailed in the HPI and/or updated in the interval history, all other systems reviewed and are negative.    Past Medical History:    has no past medical history on file.    FHx:  family history is not on file.    SHx:       Medications:    Current Facility-Administered Medications:     levETIRAcetam (Keppra) injection 1,000 mg, 1,000 mg, Intravenous, Q12HRS, Jp Seals M.D., 1,000 mg at 08/17/24 0532    Pharmacy Consult: Enteral tube insertion - review meds/change route/product selection, 1 Each, Other, PHARMACY TO DOSE, FRANCIS Torre.P.R.NDottie    thiamine (Vitamin B-1) tablet 100 mg, 100 mg, Enteral Tube, DAILY, FRANCIS Torre.P.R.N., 100 mg at 08/17/24 0521    acetaminophen (Tylenol) tablet 650 mg, 650 mg, Enteral Tube, Q6HRS PRN, Júnior Hudson M.D.    atorvastatin (Lipitor) tablet 80 mg, 80 mg, Enteral Tube, Q EVENING, Júnior Hudson M.D., 80 mg at 08/16/24 1822    ondansetron (Zofran ODT) dispertab 4 mg, 4 mg, Enteral Tube, Q4HRS PRN, Júnior Hudson M.D.    senna-docusate (Pericolace Or Senokot S) 8.6-50 MG per tablet 2 Tablet, 2 Tablet, Enteral Tube, Q EVENING, 2 Tablet at 08/16/24 1822 **AND** polyethylene glycol/lytes (Miralax) Packet 1 Packet, 1 Packet, Enteral Tube, QDAY PRN, Júnior Hudson M.D.    morphine 4 MG/ML injection 2 mg, 2 mg, Intravenous, Q2HRS PRN, FRANCIS Torre.P.R.N., 2 mg at 08/15/24 1217    heparin infusion 25,000 units in 500 mL 0.45% NACL, 0-30 Units/kg/hr (Adjusted), Intravenous, Continuous, Susana Morales, Last Rate: 20.4 mL/hr at  08/17/24 0732, 14 Units/kg/hr at 08/17/24 0732    labetalol (Normodyne/Trandate) injection 10-20 mg, 10-20 mg, Intravenous, Q4HRS PRN, Susana CHRISTY. Latona, 10 mg at 08/16/24 1020    hydrALAZINE (Apresoline) injection 10-20 mg, 10-20 mg, Intravenous, Q4HRS PRN, Susana REED Latona, 10 mg at 08/16/24 0854    niCARdipine (Cardene) 25 mg in  mL Standard Infusion, 0-15 mg/hr, Intravenous, Continuous, Tatyana Delarosa A.P.R.N., Last Rate: 50 mL/hr at 08/17/24 0535, 5 mg/hr at 08/17/24 0535    Respiratory Therapy Consult, , Nebulization, Continuous RT, Susana Morales    ondansetron (Zofran) syringe/vial injection 4 mg, 4 mg, Intravenous, Q4HRS PRN, Susana Morales, 4 mg at 08/15/24 1315    insulin regular (HumuLIN R,NovoLIN R) injection, 2-9 Units, Subcutaneous, Q6HRS, 2 Units at 08/17/24 0548 **AND** POC blood glucose manual result, , , Q6H **AND** NOTIFY MD and PharmD, , , Once **AND** Administer 20 grams of glucose (approximately 8 ounces of fruit juice) every 15 minutes PRN FSBG less than 70 mg/dL, , , PRN **AND** dextrose 50% (D50W) injection 25 g, 25 g, Intravenous, Q15 MIN PRN, Susana Morales    Physical Examination:     Vitals:    08/17/24 0615 08/17/24 0630 08/17/24 0645 08/17/24 0700   BP:       Pulse: 87 90 90 84   Resp: 16 17 (!) 24 18   Temp:       TempSrc:       SpO2: 94% 97% 96% 95%   Weight:       Height:             NEUROLOGICAL EXAM:         Patient is awake and alert however noncommunicative.  Follow commands intermittently only.  Mute.  Pupils equal reactive.  There is no gaze preference.  She will track in the room left and right.  Right facial droop.  Sustained antigravity in the left arm and leg.  Drift in the right lower.  Minimal movement in the right upper.  Decrease reaction to noxious stimuli in the right upper compared to left.  No obvious signs ataxia.       NIH Stroke Scale:    1a. Level of Consciousness:  0    1b. LOC Questions (Month, age):  2    1c. LOC Commands (Open/close eyes make  fist/let go):  1    2.   Best Gaze (Eyes open - patient follows examiner's finger on face):  0  3.   Visual Fields (introduce visual stimulus/threat to patient's field quadrants):   0  4.   Facial Paresis (Show teeth, raise eyebrows and squeeze eyes shut):  1  5a. Motor Arm - Left (Elevate arm to 90 degrees if patient is sitting, 45 degrees if  Supine): 0   5b. Motor Arm - Right (Elevate arm to 90 degrees if patient is sitting, 45 degrees if supine): 3  6a. Motor Leg - Left (Elevate leg 30 degrees with patient supine):  0  6b. Motor Leg - Right  (Elevate leg 30 degrees with patient supine):  1  7.   Limb Ataxia (Finger-nose, heel down shin):  0    8.   Sensory (Pin prick to face, arm, trunk and leg - compare side to side):  0  9.  Best Language (Name item, describe a picture and read sentences):  3  10. Dysarthria (Evaluate speech clarity by patient repeating listed words):  2  11. Extinction and Inattention (Use information from prior testing to identify neglect or double simultaneous stimuli testing): 0    Total NIH Score:  13                  Objective Data:    Labs:  Lab Results   Component Value Date/Time    PROTHROMBTM 13.7 08/15/2024 09:35 PM    INR 1.04 08/15/2024 09:35 PM      Lab Results   Component Value Date/Time    WBC 13.7 (H) 08/17/2024 03:25 AM    RBC 2.49 (L) 08/17/2024 03:25 AM    HEMOGLOBIN 7.4 (L) 08/17/2024 03:25 AM    HEMATOCRIT 23.3 (L) 08/17/2024 03:25 AM    MCV 93.6 08/17/2024 03:25 AM    MCH 29.7 08/17/2024 03:25 AM    MCHC 31.8 (L) 08/17/2024 03:25 AM    MPV 11.1 08/17/2024 03:25 AM    NEUTSPOLYS 63.2 08/14/2024 05:57 PM    LYMPHOCYTES 25.3 08/14/2024 05:57 PM    MONOCYTES 7.5 08/14/2024 05:57 PM    EOSINOPHILS 2.2 08/14/2024 05:57 PM    BASOPHILS 1.8 (H) 08/14/2024 05:57 PM      Lab Results   Component Value Date/Time    SODIUM 133 (L) 08/17/2024 03:25 AM    POTASSIUM 3.7 08/17/2024 03:25 AM    CHLORIDE 96 08/17/2024 03:25 AM    CO2 17 (L) 08/17/2024 03:25 AM    GLUCOSE 168 (H) 08/17/2024  03:25 AM     (H) 08/17/2024 03:25 AM    CREATININE 6.03 (HH) 08/17/2024 03:25 AM    GLOMRATE 18 (L) 10/25/2023 10:52 AM      Lab Results   Component Value Date/Time    CHOLSTRLTOT 290 (H) 08/14/2024 10:53 PM     (H) 08/14/2024 10:53 PM    HDL 66 08/14/2024 10:53 PM    TRIGLYCERIDE 263 (H) 08/14/2024 10:53 PM       Lab Results   Component Value Date/Time    ALKPHOSPHAT 124 (H) 08/14/2024 10:53 PM    ASTSGOT 19 08/14/2024 10:53 PM    ALTSGPT 8 08/14/2024 10:53 PM    TBILIRUBIN 0.2 08/14/2024 10:53 PM        Imaging/Testing:  DX-ABDOMEN FOR TUBE PLACEMENT   Final Result      1.  Enteric tube tip projects over the gastric fundus.      MR-BRAIN-W/O   Final Result      1.  There are small areas of acute infarction the LEFT cerebral hemisphere. There is also a tiny focus of infarct in the RIGHT cerebellum. Some of the infarcts are new since the previous study.   2.  Mild cerebral volume loss.   3.  Chronic ischemia/infarcts in the RIGHT basal ganglia.      CT-CTA NECK WITH & W/O-POST PROCESSING   Final Result         1.  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   2.  Thyroid nodules, recommend follow-up thyroid sonography due to nodule size and complexity as clinically appropriate.         CT-CTA HEAD WITH & W/O-POST PROCESS   Final Result         1.  Left P2 occlusion, which reconstitutes distally   2.  50-70% narrowing in proximal left M2 segment, could represent partial occlusion versus changes of vasospasm.   3.  Irregularity of the right M1 segment, consider vasospasm, component of vasculitis, or other underlying vascular disease.   4.  Irregularity of the distal left vertebral artery, appearance suggesting underlying atherosclerotic vascular disease.      Findings and/or recommendations of the examination where conveyed digitally using the [employer] system toJp, and message was electronically verified as Read on  "8/15/2024 8:36 PM.      CT-CEREBRAL PERFUSION ANALYSIS   Final Result         1. Cerebral blood flow less than 30% possibly representing completed infarct = 0 mL. Based on distribution of this finding, this is unlikely to represent artifact.      2. T Max more than 6 seconds possibly representing combination of completed infarct and ischemia = 0 mL. Based on the distribution of this finding, this is unlikely to represent artifact.      3. Mismatched volume possibly representing ischemic brain/penumbra= 0 mL      4.  Please note that this cerebral perfusion study and report is Quantitative and targets supratentorial (cerebral) perfusion for evaluation of large vessel territory acute ischemia/infarction. For example, lacunar infarcts, and brainstem/posterior fossa    ischemia/infarction are not evaluated on this study.  Data acquisition is subject to artifacts which can yield non-anatomically plausible perfusion maps which may be due to motion, bolus timing, signal to noise ratio, or other technical factors.    Perfusion map abnormalities which show non-anatomic distributions are likely artifact.   This study is not \"stand-alone\" and should only be utilized for diagnosis, management/treatment in correlation with CT, CTA, and/or MRI and clinical factors.         MR-BRAIN-W/O   Final Result      1.  A few acute small infarcts involving the left basal ganglia and posterior left parietal cortex.   2.  No definite acute intracranial hemorrhage.      IR-THROMBO MECHANICAL ARTERY,INIT   Final Result   Addendum (preliminary) 1 of 1   Addendum: The final angiogram demonstrates nonflow limiting severe    stenosis at left proximal M2 vessels and one of the M3 vessel.      Final      1.  Occluded LEFT M1 segment.   2.  Mechanical thrombectomy.   3.  TICI 2b flow.      CT-CHEST,ABDOMEN,PELVIS WITH   Final Result         1.  Occlusion of left superficial femoral artery with stent in place.   2.  Bilateral indeterminate thyroid " nodules, recommend follow-up thyroid sonography as clinically appropriate for further characterization due to nodule size.   3.  Gallbladder distention, additional workup as clinically appropriate.   4.  Atherosclerosis and atherosclerotic coronary artery disease      CT-HEAD W/O   Final Result         1.  No acute intracranial abnormality is identified, there are nonspecific white matter changes, commonly associated with small vessel ischemic disease.  Associated mild cerebral atrophy is noted.   2.  Atherosclerosis.               EC-ECHOCARDIOGRAM COMPLETE W/O CONT    (Results Pending)         Assessment and Plan:    79-year-old female transferred in outside hospital status post TNK found to have a left M1 distal occlusion.  Status post thrombectomy with a TICI score of 2B.  Patient is done remarkably well compared to her presentation.  Stroke scale currently  7 compared to 21.  Etiology of the infarct unknown.   MRI brain today.  Anticipate starting antiplatelet therapy tonight at the 24-hour mis which be around 7 PM today if repeat imaging does not show any concerning signs.    Update 8/16  Patient had change in neuroexam compared to my exam yesterday morning.  Speak to the nursing staff patient had off and on aphasia throughout the day.  Early in the evening she developed severe right upper extremity weakness.  I was contacted at this time.  I repeated CT head and neck which did not reveal an LVO but did show a mural thrombus in the aortic arch.  There is multivessel intracranial stenosis throughout.  Blood pressure at the time was 110 systolic.  I ordered a stat EEG.  Which showed focal slowing with some sharp waves over the left numbness.  Therefore started Keppra 1000 mg twice daily.  For the mural thrombus be started on heparin.  Ordered urgent MRI brain given the change in neurostatus.  This morning patient continues to have severe aphasia.  And right upper extremity plegia.    Update 8/17  Repeat MRI  brain yesterday showed new infarcts in the left frontal posterior parietal and 1 additional 1 in the right hemisphere.  I think the patient is pressure dependent given the M2 stenosis on the left.  Patient also has the more thrombus in the aortic arch which could explain the right sided new infarct.  Will continue heparin for now.  At least some of the Alterman status is due to the underlying renal failure.  Creatinine and BUN is tracking up upwards for the past few days.  May receive dialysis today.  Also agree with the primary service for palliative care conversation with family care later patient goals of care.  Given the multiple issues going on here.  I am also get a continuous EEG to rule out nonconvulsive seizures    Plan:  1.  Continue heparin  2.  Keep the blood pressure between 140s to 160 systolic.  3.  Continue telemonitoring  4.  TTE without bubble  5.  PT/OT and speech therapy  6.  Continue statin for an LDL goal below 70  7.  Maintain normoglycemia  8.  Continue the Keppra 1000 mg twice daily.        Spent over 56 minutes going over labs, MRI findings, examined the patient and going the care plan the primary service.      This chart was partially generated using voice recognition technology and sound alike word replacement may be present, best efforts were made to make the chart accurate.    Jp Seals MD  Board Certified Neurology, ABPN  (t) [phone]

## 2024-08-17 NOTE — PLAN OF CARE (IOPOC)
On 8/15/24 at shift change with the day RN, pt had a new neuro change. Pt was mute, not following commands, left sided gaze preference, moving the left upper extremities and bilateral lower extremities spontaneously, but was not seen moving the right upper extremity. Dr. Villalpando and Dr. Seals notified. CT scans completed. Heparin gtt with new SBP parameters (140-160) ordered. Susana MATSON ordered a 500cc bolus to be infused over 2 hours prior to starting pressor therapy as needed.

## 2024-08-17 NOTE — PROGRESS NOTES
[facility] Progress Notes     UF net removed: 0mL     HD treatment completed as ordered per Dr Beltre for 3hrs. Started at 1257, ended at 1557.  Patient tolerated dialysis treatment. Difficult cannulation on venous site prior tx, successful on 3rd time, 17g needles used. See HD flowsheets for reference.  Post HD vital signs stable.+B/T. Hemostasis achieved. Dressing changed per protocol. Instructed primary RN to take off dressing after 3hrs. Report given to Tye LIAO.

## 2024-08-17 NOTE — ASSESSMENT & PLAN NOTE
Discussed her overall clinical course and worsening renal failure as well as her strokes.     does not want to consider hospice at this time, he understands she is not a good candidate for long term dialysis.  If her fistula does not function well he want's to pursue temporary line placement for ongoing dialysis.    Keep DNR/DNI, but he does not want to discuss hospice.      Palliative consulted 8/18 (they will see tomorrow, most likely)

## 2024-08-17 NOTE — THERAPY
Speech Language Therapy Contact Note    Patient Name: Jaclyn Goddard  Age:  79 y.o., Sex:  female  Medical Record #: [id number]  Today's Date: 8/17/2024    Discussed missed therapy with RN.        08/17/24 0801   Treatment Variance   Reason For Missed Therapy Medical - Patient on Hold from Therapy;Medical - Patient not Able To Participate   Initial Contact Note    Initial Contact Note  Order Received and Verified, Speech Therapy Evaluation in Progress with Full Report to Follow.   Interdisciplinary Plan of Care Collaboration   IDT Collaboration with  Nursing   Collaboration Comments Attempted to see patient for CSE. Per RN, patient is unarousable, unable to participate. Service will hold and attempt as able/medically appropriate. Of note, GOC conversation ongoing.

## 2024-08-17 NOTE — PROGRESS NOTES
"[facility] -  PROGRESS NOTE               Author: Joshua Beltre M.D. Date & Time: 8/17/2024  10:08 AM     HPI:  The patient is a 79 y.o. right hand dominant female with a past medical history of hypertension, gout;  who presented on 8/14/2024  8:38 PM as a transfer from [facility] for stroke workup.  Patient initially presented to [facility] after she fell out of her chair.  Patient was initially treated for seizures with Keppra, underwent CT head and CTA which found left M2 occlusion, patient was given TNK and transferred to [facility] for IR.  Seen by neurology and found to have a NIH score of 21, taken to IR for mechanical thrombectomy of left M1 thrombus resulting in TICI 2B reperfusion.  NIH score improved to 7 yesterday, and MRI showed minimal burden with only a few acute small infarcts involving the left basal ganglia and posterior left parietal cortex.  Then she had deterioration, and repeat CTA found mural thrombus in the arch extending to the left carotid.  Patient restarted on heparin, and blood pressure augmented to 140-160 systolic     The patient currently is displaying expressive and receptive aphasia.  No family at bedside to help answer questions.  Awaiting repeat MRI to assess for neurologic change.    DAILY NEPHROLOGY SUMMARY:  8/17: Ongoing showered embolic epsidoes    REVIEW OF SYSTEMS:    Unable second to clinical condition        CURRENT MEDICATIONS:   Reviewed from admission to present day    VS:  BP (!) 150/50 Comment: SBP in 160s with EOB  Pulse 90   Temp 36.6 °C (97.9 °F) (Temporal)   Resp 16   Ht 1.651 m (5' 5\")   Wt 65.6 kg (144 lb 10 oz)   SpO2 95%   BMI 24.07 kg/m²   General: ill appearing  HEENT: EOMI, no scleral icterus  Heart: RR no rubs  Pulm: non labored  Abdomen: soft  Extremites: no edema  Derm: echymosis  Neuro: non coherent  Vasc Access: L AVF  Physical Exam    Fluids:  In: 1500.2 [I.V.:1230.2; Enteral:90]  Out: 100     LABS:  Recent Labs "     08/15/24  1105 08/16/24  0355 08/17/24  0325   SODIUM 135 138 133*   POTASSIUM 3.7 3.8 3.7   CHLORIDE 96 96 96   CO2 23 22 17*   GLUCOSE 146* 146* 168*   BUN 83* 84* 104*   CREATININE 3.40* 4.25* 6.03*   CALCIUM 9.6 9.2 9.2       IMAGING:   All imaging reviewed from admission to present day    IMPRESSION:  # 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 Currently anuric.   -will proceed with HD today via AVF left  - if unable second to size or hemodynamics would rec comfort care  # HTN  # Anemia  # BRBPR  # DM  # Acute Ischemic CVA with ongoing emboi  # PVD  # CKD-MBD  #Grave prognsois    Plan  - attempt HD  - titrate nicardipine drip to maintain -160  - prognosis grave would favor comfort  -

## 2024-08-18 NOTE — PROCEDURES
VIDEO ELECTROENCEPHALOGRAM  REPORT      Referring provider: Dr. Bender    DOS:   8/18/2024      INDICATION:  Jaclyn Goddard 79 y.o. female presenting with AMS    CURRENT ANTIEPILEPTIC REGIMEN: LEV     TECHNIQUE: A 30-channel, 14 hrs video electroencephalogram (VEEG) was performed in accordance with the international 10-20 system. This digital study was reviewed in bipolar and referential montages. The recording examined the patient during wakeful, drowsy and sleep states.     The EEG was set up and taken down by a Neurodiagnostic technologist who performed education to patient and staff.     A minimum but not limited to 23 electrodes and 23 channel recording was setup and performed by Neurodiagnostic technologist.    Impedances, electrode integrity, and technical impressions were documented a minimum of every 2-24 hour period by a Neurodiagnostic Technologist and reviewed by Interpreting physician.     There was supervised withdrawal of the following medications: n/a    ACTIVATION PROCEDURES:   hyperventilation was not performed    Intermittent Photic stimulation was performed in a stepwise fashion from 1 to 30 Hz and elicited no photic driving response.     DESCRIPTION OF THE RECORD:  During wakefulness, the background consisted of diffuse,symmetric theta range slowing up to 6-7 Hz. Frequent, intermixed bursts of high amplitude rhythmic,sharply contoured delta slowing in triphasic morphology were noted. No well-formed posterior dominant rhythm or anterior-posterior gradient was seen.  With drowsiness/sleep states, there was attenuation of the background rhythmic delta activity and increased fast frequency as well as theta slowing, rudimentary vertex waves and symmetrical spindles were noted.     ICTAL AND/OR INTERICTAL FINDINGS:   No focal or generalized epileptiform activity was noted.   Intermittent left hemispheric regional slowing was seen during this study.    No seizures were recorded during the study.      EVENT(S):  none     EKG: sampling review of EKG recording demonstrated sinus rhythm.       INTERPRETATION:   This is an abnormal video EEG recording in the awake, drowsy/sleep state(s).   1) The diffuse background slowing is consistent with encephalopathy.    2)The presence of intermittent left hemispheric regional slowing is suggestive of focal neuronal dysfunction.   3)No epileptiform discharges or seizures were seen. This does not rule out risk for seizure. Clinical correlation is recommended.      Jaylen Rogers MD  Diplomate, American Board of Psychiatry and Neurology   Diplomate, American Board of Psychiatry and Neurology in Epilepsy

## 2024-08-18 NOTE — EEG PROGRESS NOTE
BRIEF VIDEO EEG UPDATE NOTE    The patient is a 79-year-old woman who is being evaluated for seizures.    The first 1 hour and 45 minutes were reviewed.     The background was diffusely slow, which points toward encephalopathy, non-specific to etiology.    There were abundant, at times better developed over the left, at times semi-rhythmic, triphasic waveforms, which might point toward toxic-metabolic etiology of the above noted encephalopathy.        Photic stimulation did not elicit additional abnormalities.     There was additional, independent, R>L focal slowing.     There were rare, left temporal sharps, pointing toward increased risk for focal seizures arising from that region.     No clearly evolving electrographic seizures captured.    Full report to follow once more data is available.    Raymundo Mijares MD  Neurology Attending, Epilepsy Program  [facility]

## 2024-08-18 NOTE — CARE PLAN
The patient is Watcher - Medium risk of patient condition declining or worsening    Shift Goals  Clinical Goals: SBP: 130-160  Patient Goals: BULMARO  Family Goals: Updates    Progress made toward(s) clinical / shift goals:    Problem: Optimal Care of the Stroke Patient  Goal: Optimal acute care for the stroke patient  Outcome: Progressing     Problem: Discharge Planning - Stroke  Goal: Ensure Stroke Core Measures are met prior to discharge  Outcome: Progressing  Goal: Patient’s continuum of care needs will be met  Outcome: Progressing     Problem: Respiratory - Stroke Patient  Goal: Patient will achieve/maintain optimum respiratory rate/effort  Outcome: Progressing     Problem: Fall Risk  Goal: Patient will remain free from falls  Outcome: Progressing     Problem: Safety - Medical Restraint  Goal: Remains free of injury from restraints (Restraint for Interference with Medical Device)  Outcome: Progressing       Patient is not progressing towards the following goals:      Problem: Knowledge Deficit - Standard  Goal: Patient and family/care givers will demonstrate understanding of plan of care, disease process/condition, diagnostic tests and medications  Outcome: Not Progressing     Problem: Knowledge Deficit - Stroke Education  Goal: Patient's knowledge of stroke and risk factors will improve  Outcome: Not Progressing     Problem: Psychosocial - Patient Condition  Goal: Patient's ability to verbalize feelings about condition will improve  Outcome: Not Progressing     Problem: Neuro Status  Goal: Neuro status will remain stable or improve  Outcome: Not Progressing     Problem: Hemodynamic Monitoring  Goal: Patient's hemodynamics, fluid balance and neurologic status will be stable or improve  Outcome: Not Progressing     Problem: Dysphagia  Goal: Dysphagia will improve  Outcome: Not Met     Problem: Urinary Elimination  Goal: Establish and maintain regular urinary output  Outcome: Not Progressing     Problem: Bowel  Elimination  Goal: Establish and maintain regular bowel function  Outcome: Not Progressing     Problem: Skin Integrity  Goal: Skin integrity is maintained or improved  Outcome: Not Progressing

## 2024-08-18 NOTE — PROGRESS NOTES
"[facility] -  PROGRESS NOTE               Author: Joshua Beltre M.D. Date & Time: 8/18/2024  11:56 AM     HPI:  The patient is a 79 y.o. right hand dominant female with a past medical history of hypertension, gout;  who presented on 8/14/2024  8:38 PM as a transfer from [facility] for stroke workup.  Patient initially presented to [facility] after she fell out of her chair.  Patient was initially treated for seizures with Keppra, underwent CT head and CTA which found left M2 occlusion, patient was given TNK and transferred to [facility] for IR.  Seen by neurology and found to have a NIH score of 21, taken to IR for mechanical thrombectomy of left M1 thrombus resulting in TICI 2B reperfusion.  NIH score improved to 7 yesterday, and MRI showed minimal burden with only a few acute small infarcts involving the left basal ganglia and posterior left parietal cortex.  Then she had deterioration, and repeat CTA found mural thrombus in the arch extending to the left carotid.  Patient restarted on heparin, and blood pressure augmented to 140-160 systolic     The patient currently is displaying expressive and receptive aphasia.  No family at bedside to help answer questions.  Awaiting repeat MRI to assess for neurologic change.    DAILY NEPHROLOGY SUMMARY:  8/17: Ongoing showered embolic epsidoes  8/18 Pt udnerwent HD yesterday without issues, opens eyes to voice today    REVIEW OF SYSTEMS:    Unable second to clinical condition        CURRENT MEDICATIONS:   Reviewed from admission to present day    VS:  /62   Pulse 85   Temp 37.1 °C (98.7 °F) (Temporal)   Resp 16   Ht 1.651 m (5' 5\")   Wt 65.6 kg (144 lb 10 oz)   SpO2 98%   BMI 24.07 kg/m²   General: ill appearing  HEENT: EOMI, no scleral icterus  Heart: RR no rubs  Pulm: non labored  Abdomen: soft  Extremites: no edema  Derm: echymosis  Neuro: non coherent  Vasc Access: L AVF  Physical Exam    Fluids:  In: 3119.1 [I.V.:1591.6; " Blood:197.5; Other:20; Dialysis:500; Enteral:90]  Out: 612     LABS:  Recent Labs     08/16/24  0355 08/17/24  0325 08/18/24  0509   SODIUM 138 133* 136   POTASSIUM 3.8 3.7 4.2   CHLORIDE 96 96 100   CO2 22 17* 21   GLUCOSE 146* 168* 153*   BUN 84* 104* 59*   CREATININE 4.25* 6.03* 3.86*   CALCIUM 9.2 9.2 8.6       IMAGING:   All imaging reviewed from admission to present day    IMPRESSION:  # 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 Currently anuric.   -Hold HD sunday  - proceedw ith hD in am  # HTN  # Anemia  # BRBPR  # DM  # Acute Ischemic CVA with ongoing emboi  # PVD  # CKD-MBD  #Grave prognosis    Plan  - HD Monday  - titrate nicardipine drip to maintain -160  - prognosis remains grave

## 2024-08-18 NOTE — PROGRESS NOTES
Neurology Progress Note  Neurohospitalist Service, [facility]    Referring Physician: Júnior Hudson M.D.    Chief Complaint   Patient presents with    Possible Stroke     LKW 1645  2000 mg Keppra for seizure prophylaxis  TNK @ 1915       HPI: Refer to initial documented Neurology H&P, as detailed in the patient's chart.    Interval History 8/18: No new events overnight        Review of systems: In addition to what is detailed in the HPI and/or updated in the interval history, all other systems reviewed and are negative.    Past Medical History:    has no past medical history on file.    FHx:  family history is not on file.    SHx:       Medications:    Current Facility-Administered Medications:     [START ON 8/19/2024] epoetin (Retacrit) injection (Dialysis Use Only) 3,000 Units, 3,000 Units, Intravenous, MO, WE + FR, Joshua Betlre M.D.    NS (Bolus) 0.9 % infusion 1,000 mL, 1,000 mL, Intravenous, DIALYSIS PRN, Joshua Beltre M.D.    levETIRAcetam (Keppra) injection 1,000 mg, 1,000 mg, Intravenous, Q12HRS, Jp Seals M.D., 1,000 mg at 08/18/24 0528    Pharmacy Consult: Enteral tube insertion - review meds/change route/product selection, 1 Each, Other, PHARMACY TO DOSE, FRANCIS Torre.P.R.NDottie    thiamine (Vitamin B-1) tablet 100 mg, 100 mg, Enteral Tube, DAILY, FRANCIS Torre.P.R.NDottie, 100 mg at 08/18/24 0528    acetaminophen (Tylenol) tablet 650 mg, 650 mg, Enteral Tube, Q6HRS PRN, Júnior Hudson M.D.    atorvastatin (Lipitor) tablet 80 mg, 80 mg, Enteral Tube, Q EVENING, Júnior Hudson M.D., 80 mg at 08/17/24 1816    ondansetron (Zofran ODT) dispertab 4 mg, 4 mg, Enteral Tube, Q4HRS PRN, Júnior Hudson M.D.    senna-docusate (Pericolace Or Senokot S) 8.6-50 MG per tablet 2 Tablet, 2 Tablet, Enteral Tube, Q EVENING, 2 Tablet at 08/17/24 0506 **AND** polyethylene glycol/lytes (Miralax) Packet 1 Packet, 1 Packet, Enteral Tube, QDAY PRN, Júnior Hudson M.D.    morphine 4 MG/ML  injection 2 mg, 2 mg, Intravenous, Q2HRS PRN, Tatyana Delarosa A.P.R.NDottie, 2 mg at 08/15/24 1217    heparin infusion 25,000 units in 500 mL 0.45% NACL, 0-30 Units/kg/hr (Adjusted), Intravenous, Continuous, Susana Morales, Last Rate: 20.4 mL/hr at 08/18/24 0719, 14 Units/kg/hr at 08/18/24 0719    labetalol (Normodyne/Trandate) injection 10-20 mg, 10-20 mg, Intravenous, Q4HRS PRN, Susana REED Latona, 10 mg at 08/16/24 1020    hydrALAZINE (Apresoline) injection 10-20 mg, 10-20 mg, Intravenous, Q4HRS PRN, Susana REED Latona, 10 mg at 08/16/24 0854    niCARdipine (Cardene) 25 mg in  mL Standard Infusion, 0-15 mg/hr, Intravenous, Continuous, Júnior Hudson M.D., Last Rate: 25 mL/hr at 08/18/24 0613, 2.5 mg/hr at 08/18/24 0613    Respiratory Therapy Consult, , Nebulization, Continuous RT, Susana Morales    ondansetron (Zofran) syringe/vial injection 4 mg, 4 mg, Intravenous, Q4HRS PRN, Susana Morales, 4 mg at 08/15/24 1315    insulin regular (HumuLIN R,NovoLIN R) injection, 2-9 Units, Subcutaneous, Q6HRS, 2 Units at 08/18/24 0549 **AND** POC blood glucose manual result, , , Q6H **AND** NOTIFY MD and PharmD, , , Once **AND** Administer 20 grams of glucose (approximately 8 ounces of fruit juice) every 15 minutes PRN FSBG less than 70 mg/dL, , , PRN **AND** dextrose 50% (D50W) injection 25 g, 25 g, Intravenous, Q15 MIN PRN, Susana Morales    Physical Examination:     Vitals:    08/18/24 0300 08/18/24 0400 08/18/24 0500 08/18/24 0600   BP:       Pulse: 82 89 91 91   Resp: 16 16 20 13   Temp:  37.1 °C (98.7 °F)     TempSrc:       SpO2: 91% 97% 96% 95%   Weight:       Height:             NEUROLOGICAL EXAM:         Patient is awake and alert however noncommunicative.  Not following commands today.  Mute.  Pupils equal reactive.  There is no gaze preference.  She will track in the room.  Right facial droop.  Sustained antigravity in the left arm and leg.  Drift in the right lower.  Minimal movement in the right upper.  Decrease  reaction to noxious stimuli in the right upper compared to left.  No obvious signs ataxia.       NIH Stroke Scale:    1a. Level of Consciousness:  0    1b. LOC Questions (Month, age):  2    1c. LOC Commands (Open/close eyes make fist/let go):  2    2.   Best Gaze (Eyes open - patient follows examiner's finger on face):  0  3.   Visual Fields (introduce visual stimulus/threat to patient's field quadrants):   0  4.   Facial Paresis (Show teeth, raise eyebrows and squeeze eyes shut):  1  5a. Motor Arm - Left (Elevate arm to 90 degrees if patient is sitting, 45 degrees if  Supine): 0   5b. Motor Arm - Right (Elevate arm to 90 degrees if patient is sitting, 45 degrees if supine): 3  6a. Motor Leg - Left (Elevate leg 30 degrees with patient supine):  0  6b. Motor Leg - Right  (Elevate leg 30 degrees with patient supine):  1  7.   Limb Ataxia (Finger-nose, heel down shin):  0    8.   Sensory (Pin prick to face, arm, trunk and leg - compare side to side):  0  9.  Best Language (Name item, describe a picture and read sentences):  3  10. Dysarthria (Evaluate speech clarity by patient repeating listed words):  2  11. Extinction and Inattention (Use information from prior testing to identify neglect or double simultaneous stimuli testing): 0    Total NIH Score:  14                  Objective Data:    Labs:  Lab Results   Component Value Date/Time    PROTHROMBTM 13.7 08/15/2024 09:35 PM    INR 1.04 08/15/2024 09:35 PM      Lab Results   Component Value Date/Time    WBC 12.5 (H) 08/18/2024 05:09 AM    RBC 2.61 (L) 08/18/2024 05:09 AM    HEMOGLOBIN 7.7 (L) 08/18/2024 05:09 AM    HEMATOCRIT 23.9 (L) 08/18/2024 05:09 AM    MCV 91.6 08/18/2024 05:09 AM    MCH 29.5 08/18/2024 05:09 AM    MCHC 32.2 08/18/2024 05:09 AM    MPV 11.2 08/18/2024 05:09 AM    NEUTSPOLYS 63.2 08/14/2024 05:57 PM    LYMPHOCYTES 25.3 08/14/2024 05:57 PM    MONOCYTES 7.5 08/14/2024 05:57 PM    EOSINOPHILS 2.2 08/14/2024 05:57 PM    BASOPHILS 1.8 (H) 08/14/2024  05:57 PM      Lab Results   Component Value Date/Time    SODIUM 136 08/18/2024 05:09 AM    POTASSIUM 4.2 08/18/2024 05:09 AM    CHLORIDE 100 08/18/2024 05:09 AM    CO2 21 08/18/2024 05:09 AM    GLUCOSE 153 (H) 08/18/2024 05:09 AM    BUN 59 (H) 08/18/2024 05:09 AM    CREATININE 3.86 (H) 08/18/2024 05:09 AM    GLOMRATE 18 (L) 10/25/2023 10:52 AM      Lab Results   Component Value Date/Time    CHOLSTRLTOT 290 (H) 08/14/2024 10:53 PM     (H) 08/14/2024 10:53 PM    HDL 66 08/14/2024 10:53 PM    TRIGLYCERIDE 263 (H) 08/14/2024 10:53 PM       Lab Results   Component Value Date/Time    ALKPHOSPHAT 124 (H) 08/14/2024 10:53 PM    ASTSGOT 19 08/14/2024 10:53 PM    ALTSGPT 8 08/14/2024 10:53 PM    TBILIRUBIN 0.2 08/14/2024 10:53 PM        Imaging/Testing:  EC-ECHOCARDIOGRAM COMPLETE W/O CONT   Final Result      DX-ABDOMEN FOR TUBE PLACEMENT   Final Result      1.  Enteric tube tip projects over the gastric fundus.      MR-BRAIN-W/O   Final Result      1.  There are small areas of acute infarction the LEFT cerebral hemisphere. There is also a tiny focus of infarct in the RIGHT cerebellum. Some of the infarcts are new since the previous study.   2.  Mild cerebral volume loss.   3.  Chronic ischemia/infarcts in the RIGHT basal ganglia.      CT-CTA NECK WITH & W/O-POST PROCESSING   Final Result         1.  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   2.  Thyroid nodules, recommend follow-up thyroid sonography due to nodule size and complexity as clinically appropriate.         CT-CTA HEAD WITH & W/O-POST PROCESS   Final Result         1.  Left P2 occlusion, which reconstitutes distally   2.  50-70% narrowing in proximal left M2 segment, could represent partial occlusion versus changes of vasospasm.   3.  Irregularity of the right M1 segment, consider vasospasm, component of vasculitis, or other underlying vascular disease.   4.  Irregularity of the  "distal left vertebral artery, appearance suggesting underlying atherosclerotic vascular disease.      Findings and/or recommendations of the examination where conveyed digitally using the [facility] system to, Jp Seals, and message was electronically verified as Read on 8/15/2024 8:36 PM.      CT-CEREBRAL PERFUSION ANALYSIS   Final Result         1. Cerebral blood flow less than 30% possibly representing completed infarct = 0 mL. Based on distribution of this finding, this is unlikely to represent artifact.      2. T Max more than 6 seconds possibly representing combination of completed infarct and ischemia = 0 mL. Based on the distribution of this finding, this is unlikely to represent artifact.      3. Mismatched volume possibly representing ischemic brain/penumbra= 0 mL      4.  Please note that this cerebral perfusion study and report is Quantitative and targets supratentorial (cerebral) perfusion for evaluation of large vessel territory acute ischemia/infarction. For example, lacunar infarcts, and brainstem/posterior fossa    ischemia/infarction are not evaluated on this study.  Data acquisition is subject to artifacts which can yield non-anatomically plausible perfusion maps which may be due to motion, bolus timing, signal to noise ratio, or other technical factors.    Perfusion map abnormalities which show non-anatomic distributions are likely artifact.   This study is not \"stand-alone\" and should only be utilized for diagnosis, management/treatment in correlation with CT, CTA, and/or MRI and clinical factors.         MR-BRAIN-W/O   Final Result      1.  A few acute small infarcts involving the left basal ganglia and posterior left parietal cortex.   2.  No definite acute intracranial hemorrhage.      IR-THROMBO MECHANICAL ARTERY,INIT   Final Result   Addendum (preliminary) 1 of 1   Addendum: The final angiogram demonstrates nonflow limiting severe    stenosis at left proximal M2 vessels and one of the " M3 vessel.      Final      1.  Occluded LEFT M1 segment.   2.  Mechanical thrombectomy.   3.  TICI 2b flow.      CT-CHEST,ABDOMEN,PELVIS WITH   Final Result         1.  Occlusion of left superficial femoral artery with stent in place.   2.  Bilateral indeterminate thyroid nodules, recommend follow-up thyroid sonography as clinically appropriate for further characterization due to nodule size.   3.  Gallbladder distention, additional workup as clinically appropriate.   4.  Atherosclerosis and atherosclerotic coronary artery disease      CT-HEAD W/O   Final Result         1.  No acute intracranial abnormality is identified, there are nonspecific white matter changes, commonly associated with small vessel ischemic disease.  Associated mild cerebral atrophy is noted.   2.  Atherosclerosis.                     Assessment and Plan:    79-year-old female transferred in outside hospital status post TNK found to have a left M1 distal occlusion.  Status post thrombectomy with a TICI score of 2B.  Patient is done remarkably well compared to her presentation.  Stroke scale currently  7 compared to 21.  Etiology of the infarct unknown.   MRI brain today.  Anticipate starting antiplatelet therapy tonight at the 24-hour mis which be around 7 PM today if repeat imaging does not show any concerning signs.    Update 8/16  Patient had change in neuroexam compared to my exam yesterday morning.  Speak to the nursing staff patient had off and on aphasia throughout the day.  Early in the evening she developed severe right upper extremity weakness.  I was contacted at this time.  I repeated CT head and neck which did not reveal an LVO but did show a mural thrombus in the aortic arch.  There is multivessel intracranial stenosis throughout.  Blood pressure at the time was 110 systolic.  I ordered a stat EEG.  Which showed focal slowing with some sharp waves over the left numbness.  Therefore started Keppra 1000 mg twice daily.  For the  mural thrombus be started on heparin.  Ordered urgent MRI brain given the change in neurostatus.  This morning patient continues to have severe aphasia.  And right upper extremity plegia.    Update 8/17  Repeat MRI brain yesterday showed new infarcts in the left frontal posterior parietal and 1 additional 1 in the right hemisphere.  I think the patient is pressure dependent given the M2 stenosis on the left.  Patient also has the more thrombus in the aortic arch which could explain the right sided new infarct.  Will continue heparin for now.  At least some of the Alterman status is due to the underlying renal failure.  Creatinine and BUN is tracking up upwards for the past few days.  May receive dialysis today.  Also agree with the primary service for palliative care conversation with family care later patient goals of care.  Given the multiple issues going on here.  I am also get a continuous EEG to rule out nonconvulsive seizures    Update 8/18.  Patient continues to be mute.  Not following commands.  Received hemodialysis yesterday.  There is improvement of the BUN and creatinine today.  Getting continuous EEG today.  Rule out underlying nonconvulsive seizures.  Agree with primary service obtain palliative care consultation with the family.    Plan:  1.  Continue heparin  2.  Keep the blood pressure between 140s to 160 systolic.  3.  Continue telemonitoring  4.  Palliative care consultation.  5.  PT/OT and speech therapy  6.  Continue statin for an LDL goal below 70  7.  Maintain normoglycemia  8.  Continue the Keppra 1000 mg twice daily.            This chart was partially generated using voice recognition technology and sound alike word replacement may be present, best efforts were made to make the chart accurate.    Jp Seals MD  Board Certified Neurology, ABPN  (t) [phone]

## 2024-08-18 NOTE — CARE PLAN
The patient is Watcher - Medium risk of patient condition declining or worsening    Shift Goals  Clinical Goals: -160, Neuro checks, Q6 Hgbs,  Patient Goals: Unable to assess  Family Goals: Updates    Progress made toward(s) clinical / shift goals:    Problem: Optimal Care of the Stroke Patient  Goal: Optimal acute care for the stroke patient    Outcome: Progressing  Problem: Hemodynamic Monitoring  Goal: Patient's hemodynamics, fluid balance and neurologic status will be stable or improve  Outcome: Progressing     Problem: Pain - Standard  Goal: Alleviation of pain or a reduction in pain to the patient’s comfort goal  Outcome: Progressing       Problem: Skin Integrity  Goal: Skin integrity is maintained or improved  Outcome: Progressing     Problem: Fall Risk  Goal: Patient will remain free from falls  Outcome: Progressing     Problem: Safety - Medical Restraint  Goal: Remains free of injury from restraints (Restraint for Interference with Medical Device)  Outcome: Progressing       Patient is not progressing towards the following goals:      Problem: Safety - Medical Restraint  Goal: Free from restraint(s) (Restraint for Interference with Medical Device)  Outcome: Not Progressing
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
## 2024-08-18 NOTE — THERAPY
Speech Language Therapy Contact Note    Patient Name: Jaclyn Goddard  Age:  79 y.o., Sex:  female  Medical Record #: [id number]  Today's Date: 8/18/2024    RN contacted regarding pt's status for CSE today. RN reported pt is not yet alert enough to tolerate CSE. SLP to f/u as able.

## 2024-08-19 NOTE — CARE PLAN
The patient is Watcher - Medium risk of patient condition declining or worsening    Shift Goals  Clinical Goals: -160, Q4 neuro checks, Q6 H&H  Patient Goals: BULMARO  Family Goals: Updates    Progress made toward(s) clinical / shift goals:    Problem: Optimal Care of the Stroke Patient  Goal: Optimal acute care for the stroke patient  Outcome: Progressing     Problem: Neuro Status  Goal: Neuro status will remain stable or improve  Outcome: Progressing     Problem: Hemodynamic Monitoring  Goal: Patient's hemodynamics, fluid balance and neurologic status will be stable or improve  Outcome: Progressing     Problem: Bowel Elimination  Goal: Establish and maintain regular bowel function  Outcome: Not Progressing     Problem: Skin Integrity  Goal: Skin integrity is maintained or improved  Outcome: Progressing     Problem: Safety - Medical Restraint  Goal: Remains free of injury from restraints (Restraint for Interference with Medical Device)  Outcome: Progressing       Patient is not progressing towards the following goals:      Problem: Bowel Elimination  Goal: Establish and maintain regular bowel function  Outcome: Not Progressing     Problem: Safety - Medical Restraint  Goal: Free from restraint(s) (Restraint for Interference with Medical Device)  Outcome: Not Progressing

## 2024-08-19 NOTE — PROGRESS NOTES
Neurology Progress Note  Neurohospitalist Service, [facility]    Referring Physician: Ellis Villalpando M.D.      Interval History: No acute events overnight.  Exam remains poor.  EEG with no seizure events.    Review of systems: In addition to what is detailed in the HPI and/or updated in the interval history, all other systems reviewed and are negative.    Past Medical History, Past Surgical History and Social History reviewed and unchanged from prior    Medications:    Current Facility-Administered Medications:     [START ON 8/20/2024] levETIRAcetam (Keppra) injection 1,000 mg, 1,000 mg, Intravenous, DAILY **AND** levETIRAcetam (Keppra) injection 500 mg, 500 mg, Intravenous, QDAY PRN, Ellis Villalpando M.D.    epoetin (Retacrit) injection (Dialysis Use Only) 3,000 Units, 3,000 Units, Intravenous, MO, WE + FR, Joshua Beltre M.D.    NS (Bolus) 0.9 % infusion 1,000 mL, 1,000 mL, Intravenous, DIALYSIS PRN, Joshua Beltre M.D.    Pharmacy Consult: Enteral tube insertion - review meds/change route/product selection, 1 Each, Other, PHARMACY TO DOSE, Tatyana Delarosa, A.P.R.NDottie    thiamine (Vitamin B-1) tablet 100 mg, 100 mg, Enteral Tube, DAILY, FRANCIS Torre.P.R.N., 100 mg at 08/19/24 0649    acetaminophen (Tylenol) tablet 650 mg, 650 mg, Enteral Tube, Q6HRS PRN, Júnior Hudson M.D.    atorvastatin (Lipitor) tablet 80 mg, 80 mg, Enteral Tube, Q EVENING, Júnior Hudson M.D., 80 mg at 08/18/24 1724    ondansetron (Zofran ODT) dispertab 4 mg, 4 mg, Enteral Tube, Q4HRS PRN, Júnior Hudson M.D.    senna-docusate (Pericolace Or Senokot S) 8.6-50 MG per tablet 2 Tablet, 2 Tablet, Enteral Tube, Q EVENING, 2 Tablet at 08/18/24 1724 **AND** polyethylene glycol/lytes (Miralax) Packet 1 Packet, 1 Packet, Enteral Tube, QDAY PRN, Júnior Hudson M.D., 1 Packet at 08/18/24 1724    morphine 4 MG/ML injection 2 mg, 2 mg, Intravenous, Q2HRS PRN, Tatyana Delarosa A.P.R.NDottie, 2 mg at 08/15/24 1217    heparin  "infusion 25,000 units in 500 mL 0.45% NACL, 0-30 Units/kg/hr (Adjusted), Intravenous, Continuous, Susana Morales, Last Rate: 20.4 mL/hr at 08/19/24 0700, 14 Units/kg/hr at 08/19/24 0700    labetalol (Normodyne/Trandate) injection 10-20 mg, 10-20 mg, Intravenous, Q4HRS PRN, Susana REED Latona, 20 mg at 08/19/24 0029    hydrALAZINE (Apresoline) injection 10-20 mg, 10-20 mg, Intravenous, Q4HRS PRN, Susana Moranona, 20 mg at 08/19/24 0824    niCARdipine (Cardene) 25 mg in  mL Standard Infusion, 0-15 mg/hr, Intravenous, Continuous, Júnior Hudson M.D., Last Rate: 50 mL/hr at 08/19/24 0456, 5 mg/hr at 08/19/24 0456    Respiratory Therapy Consult, , Nebulization, Continuous RT, Susana Morales    ondansetron (Zofran) syringe/vial injection 4 mg, 4 mg, Intravenous, Q4HRS PRN, Susana Morales, 4 mg at 08/15/24 1315    insulin regular (HumuLIN R,NovoLIN R) injection, 2-9 Units, Subcutaneous, Q6HRS, 2 Units at 08/19/24 0522 **AND** POC blood glucose manual result, , , Q6H **AND** NOTIFY MD and PharmD, , , Once **AND** Administer 20 grams of glucose (approximately 8 ounces of fruit juice) every 15 minutes PRN FSBG less than 70 mg/dL, , , PRN **AND** dextrose 50% (D50W) injection 25 g, 25 g, Intravenous, Q15 MIN PRN, Susana Morales    Physical Examination:   BP (!) 164/66   Pulse 90   Temp 36.9 °C (98.4 °F) (Temporal)   Resp 20   Ht 1.651 m (5' 5\")   Wt 65.6 kg (144 lb 10 oz)   SpO2 92%   BMI 24.07 kg/m²       General: Patient is lethargic  Neck: There is normal range of motion  CV: Regular rate   Extremities:  Warm, dry, and intact, without peripheral lower extremity edema    NEUROLOGICAL EXAM:     Mental status: Eyes closed, opens to voice, mostly non-interactive  Speech and language:  No speech output, no command following  Cranial nerve exam: Eyes midline.  Not clearly tracking me.  R face droop.  Motor exam: Flexion withdrawal in left upper and lower extremities.  No movement in R arm and leg  Sensory exam:  " Reacts to tactile in all 4 extremities  Coordination: No ataxia on elicited movements    Objective Data:    Labs:  Lab Results   Component Value Date/Time    PROTHROMBTM 13.7 08/15/2024 09:35 PM    INR 1.04 08/15/2024 09:35 PM      Lab Results   Component Value Date/Time    WBC 13.2 (H) 08/19/2024 05:16 AM    RBC 2.59 (L) 08/19/2024 05:16 AM    HEMOGLOBIN 7.7 (L) 08/19/2024 05:16 AM    HEMATOCRIT 23.6 (L) 08/19/2024 05:16 AM    MCV 91.1 08/19/2024 05:16 AM    MCH 29.7 08/19/2024 05:16 AM    MCHC 32.6 08/19/2024 05:16 AM    MPV 11.2 08/19/2024 05:16 AM    NEUTSPOLYS 63.2 08/14/2024 05:57 PM    LYMPHOCYTES 25.3 08/14/2024 05:57 PM    MONOCYTES 7.5 08/14/2024 05:57 PM    EOSINOPHILS 2.2 08/14/2024 05:57 PM    BASOPHILS 1.8 (H) 08/14/2024 05:57 PM      Lab Results   Component Value Date/Time    SODIUM 136 08/19/2024 05:16 AM    POTASSIUM 3.9 08/19/2024 05:16 AM    CHLORIDE 99 08/19/2024 05:16 AM    CO2 21 08/19/2024 05:16 AM    GLUCOSE 170 (H) 08/19/2024 05:16 AM    BUN 82 (H) 08/19/2024 05:16 AM    CREATININE 4.94 (HH) 08/19/2024 05:16 AM    GLOMRATE 18 (L) 10/25/2023 10:52 AM      Lab Results   Component Value Date/Time    CHOLSTRLTOT 290 (H) 08/14/2024 10:53 PM     (H) 08/14/2024 10:53 PM    HDL 66 08/14/2024 10:53 PM    TRIGLYCERIDE 263 (H) 08/14/2024 10:53 PM       Lab Results   Component Value Date/Time    ALKPHOSPHAT 124 (H) 08/14/2024 10:53 PM    ASTSGOT 19 08/14/2024 10:53 PM    ALTSGPT 8 08/14/2024 10:53 PM    TBILIRUBIN 0.2 08/14/2024 10:53 PM        Imaging/Testing:    I interpreted and/or reviewed the patient's neuroimaging    DX-ABDOMEN FOR TUBE PLACEMENT   Final Result      Dobbhoff tube tip projects over the distal stomach      EC-ECHOCARDIOGRAM COMPLETE W/O CONT   Final Result      DX-ABDOMEN FOR TUBE PLACEMENT   Final Result      1.  Enteric tube tip projects over the gastric fundus.      MR-BRAIN-W/O   Final Result      1.  There are small areas of acute infarction the LEFT cerebral hemisphere.  There is also a tiny focus of infarct in the RIGHT cerebellum. Some of the infarcts are new since the previous study.   2.  Mild cerebral volume loss.   3.  Chronic ischemia/infarcts in the RIGHT basal ganglia.      CT-CTA NECK WITH & W/O-POST PROCESSING   Final Result         1.  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   2.  Thyroid nodules, recommend follow-up thyroid sonography due to nodule size and complexity as clinically appropriate.         CT-CTA HEAD WITH & W/O-POST PROCESS   Final Result         1.  Left P2 occlusion, which reconstitutes distally   2.  50-70% narrowing in proximal left M2 segment, could represent partial occlusion versus changes of vasospasm.   3.  Irregularity of the right M1 segment, consider vasospasm, component of vasculitis, or other underlying vascular disease.   4.  Irregularity of the distal left vertebral artery, appearance suggesting underlying atherosclerotic vascular disease.      Findings and/or recommendations of the examination where conveyed digitally using the [employer] system to, Jp Seals, and message was electronically verified as Read on 8/15/2024 8:36 PM.      CT-CEREBRAL PERFUSION ANALYSIS   Final Result         1. Cerebral blood flow less than 30% possibly representing completed infarct = 0 mL. Based on distribution of this finding, this is unlikely to represent artifact.      2. T Max more than 6 seconds possibly representing combination of completed infarct and ischemia = 0 mL. Based on the distribution of this finding, this is unlikely to represent artifact.      3. Mismatched volume possibly representing ischemic brain/penumbra= 0 mL      4.  Please note that this cerebral perfusion study and report is Quantitative and targets supratentorial (cerebral) perfusion for evaluation of large vessel territory acute ischemia/infarction. For example, lacunar infarcts, and  "brainstem/posterior fossa    ischemia/infarction are not evaluated on this study.  Data acquisition is subject to artifacts which can yield non-anatomically plausible perfusion maps which may be due to motion, bolus timing, signal to noise ratio, or other technical factors.    Perfusion map abnormalities which show non-anatomic distributions are likely artifact.   This study is not \"stand-alone\" and should only be utilized for diagnosis, management/treatment in correlation with CT, CTA, and/or MRI and clinical factors.         MR-BRAIN-W/O   Final Result      1.  A few acute small infarcts involving the left basal ganglia and posterior left parietal cortex.   2.  No definite acute intracranial hemorrhage.      IR-THROMBO MECHANICAL ARTERY,INIT   Final Result   Addendum (preliminary) 1 of 1   Addendum: The final angiogram demonstrates nonflow limiting severe    stenosis at left proximal M2 vessels and one of the M3 vessel.      Final      1.  Occluded LEFT M1 segment.   2.  Mechanical thrombectomy.   3.  TICI 2b flow.      CT-CHEST,ABDOMEN,PELVIS WITH   Final Result         1.  Occlusion of left superficial femoral artery with stent in place.   2.  Bilateral indeterminate thyroid nodules, recommend follow-up thyroid sonography as clinically appropriate for further characterization due to nodule size.   3.  Gallbladder distention, additional workup as clinically appropriate.   4.  Atherosclerosis and atherosclerotic coronary artery disease      CT-HEAD W/O   Final Result         1.  No acute intracranial abnormality is identified, there are nonspecific white matter changes, commonly associated with small vessel ischemic disease.  Associated mild cerebral atrophy is noted.   2.  Atherosclerosis.                   Assessment and Plan:  Jaclyn Goddard is a 79 year old woman with L MCA stroke, s/p TNK and TICI 2b mechanical thrombectomy.  Post-operative course complicated by worsening neurologic status- MRI brain with " worsening L MCA stoke burden.  She now has renal failure and is dialysis dependent.  From a stroke perspective- best case scenario is likely 24/7 dependent, institutional care for basic ADLs given degree of aphasia and dominant side hemiplegia.  She may require a PEG.  From a secondary stroke prevention standpoint, she has a aortic arch thrombus- which is likely embolic source given some embolic hits to the R hemisphere as well.  She is on heparin therapy.  Given severe disability, and worsening renal function, possible dialysis dependence, ongoing GOC discussion.    Problem list:   Bilateral strokes   Renal failure   Aortic arch thrombus   Intracranial atherosclerosis    Plan:   - q4h neurochecks/NIHSS ok   - continue heparin infusion- will eventually transition to DOAC if within GOC   - SBP goal 120-160 given partial reperfusion and likely residual ischemic penumbra   - stroke labs: , HgbA1c 6.0   - continue atorvastatin 80mg daily for goal LDL < 70   - decrease keppra to HD dosing as this may be oversedating at current dosing- 500mg IV q12h, additional 500mg dose post-HD   - discontinue cEEG    - PT/OT/SLP as tolerated   - palliative care consult to delineate GOC given severe disability and multi-organ failure    The evaluation of the patient, and recommended management, was discussed with Dr. Jeffry Villalpando, ICU attending. I have performed a physical exam and reviewed and updated ROS and Plan today (8/19/2024).     Jp Bender MD  Vascular Neurology

## 2024-08-19 NOTE — THERAPY
Speech Language Therapy Contact Note    Patient Name: Jaclyn Goddard  Age:  79 y.o., Sex:  female  Medical Record #: [id number]  Today's Date: 8/19/2024    Discussed missed therapy with RN     08/19/24 0809   Treatment Variance   Reason For Missed Therapy Medical - Patient on Hold from Therapy;Medical - Patient not Able To Participate   Interdisciplinary Plan of Care Collaboration   IDT Collaboration with  Nursing   Collaboration Comments Attempted to see patient for CSE, patient is currently NPO/NGT. Per RN, patient is unarousable, unable to participate. Service will hold and attempt as able/medically appropriate.

## 2024-08-19 NOTE — DISCHARGE PLANNING
"PT spouse was called during Pt demise occurring during IDT rounds. RNCM encouraged spouse to call friends to come be with him and drive him to [facility]. RNCM also spoke with Pt's Niece, Elizabeth. Elizabeth is calling friends to come be with Spouse and drive him to [facility].  Mr. Goddard does not wish to see Pt in this state,\"Theres nothing to see. She is gone.\"  Ellis, [phone] a friend of the family from [facility] is being called to be with spouse.    1000 RNCM spoke with Ilia, Pt's . He does not wish to come to the Hospital.He is audibly more upset than in earlier conversation.  He is deferring to Elizabeth to make decisions. Ellis and his wife are staying with Ilia until Elizabeth returns from vacation.    RNCM spoke with Elizabeth, who is [place] celebrating her birthday today. Resources were emailed to her and she will be contacting the Mortuary of choice and getting back to RNCM within the next few hours.     "
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
## 2024-08-19 NOTE — CONSULTS
Palliative   Presented to RICU to complete consult and patient has .   Thank you,    Vicky Bui, MSN, APRN, ACNPC-AG

## 2024-08-19 NOTE — DIETARY
Nutrition Services Brief Update:    Problem: Nutritional:  Goal: Nutrition support tolerated and meeting greater than 85% of estimated needs  Outcome: Progressing Slowly    Pt had IRIs replaced this am, confirmed on KUB in distal stomach. Plan for HD today and received on 8/17 as well. To better meet protein needs now on HD, change TF to Glucerna 1.2 with goal rate 55 ml/hr to provide 1584 kcals, 79 gm protein, and 1082 ml free water per day.     RD following.

## 2024-08-19 NOTE — DISCHARGE PLANNING
Please review the consult from Dr. Pace regarding post acute recommendations.  TCC will no longer follow.  Please reach out to myself with any questions.

## 2024-08-19 NOTE — CARE PLAN
The patient is Watcher - Medium risk of patient condition declining or worsening    Shift Goals  Clinical Goals: -160, Q4 neuro checks, Q6 H&H  Patient Goals: BULMARO  Family Goals: Updates    Progress made toward(s) clinical / shift goals:  Free from injury from restraints  Problem: Safety - Medical Restraint  Goal: Remains free of injury from restraints (Restraint for Interference with Medical Device)  Outcome: Progressing       Patient is not progressing towards the following goals:  Unable to fully assess neuro status.  Antihypertensives in use      Problem: Neuro Status  Goal: Neuro status will remain stable or improve  Outcome: Not Progressing     Problem: Hemodynamic Monitoring  Goal: Patient's hemodynamics, fluid balance and neurologic status will be stable or improve  Outcome: Not Progressing

## 2024-08-22 NOTE — DOCUMENTATION QUERY
"                                                                         [facility]                                                                       Query Response Note      PATIENT:               RUBEN BARKER  ACCT #:                  [id number]  MRN:                     [id number]  :                      1945  ADMIT DATE:       2024 8:38 PM  DISCH DATE:        2024 9:28 AM  RESPONDING  PROVIDER #:        [id number]           QUERY TEXT:    In your clinical judgment, please clarify the agonal respirations with a correlating diagnosis.    The patient's clinical indicators include:  79 year old female transferred for a thrombectomy.     Kwasi \" Rupesh developed agonal respirations which progressed to apnea which was followed by PEA and asystole. \"    Risk factors: CKD 4, mural thrombus in the arch extending into the left carotid, HTN, strokes,   Treatment: labs, MRI, CT, DNR/DNI,TNK, palliative care consult    If you have any questions, please contact:  Kelly Bloom RN CDI [facility]  [email or web address]  Kelly Bloom [address] [employer]    Note: If you agree with a diagnosis listed, please remember to include it in your concurrent daily documentation and onto the Discharge Summary.  Options provided:   -- Acute hypoxic respiratory failure   -- Acute hypercapnic respiratory failure   -- Other explanation, (please specify the other explanation)      Query created by: Kelly Bloom on 2024 11:55 AM    RESPONSE TEXT:    Acute hypoxic respiratory failure          Electronically signed by:  ELSIE MAHER MD 2024 10:39 AM              "